# Patient Record
Sex: FEMALE | Race: WHITE | NOT HISPANIC OR LATINO | URBAN - METROPOLITAN AREA
[De-identification: names, ages, dates, MRNs, and addresses within clinical notes are randomized per-mention and may not be internally consistent; named-entity substitution may affect disease eponyms.]

---

## 2017-01-01 ENCOUNTER — COMMUNICATION - HEALTHEAST (OUTPATIENT)
Dept: INTERNAL MEDICINE | Facility: CLINIC | Age: 67
End: 2017-01-01

## 2017-01-01 DIAGNOSIS — K21.9 ESOPHAGEAL REFLUX: ICD-10-CM

## 2017-01-03 ENCOUNTER — COMMUNICATION - HEALTHEAST (OUTPATIENT)
Dept: INTERNAL MEDICINE | Facility: CLINIC | Age: 67
End: 2017-01-03

## 2017-01-03 DIAGNOSIS — K21.9 ESOPHAGEAL REFLUX: ICD-10-CM

## 2017-01-09 ENCOUNTER — OFFICE VISIT - HEALTHEAST (OUTPATIENT)
Dept: INTERNAL MEDICINE | Facility: CLINIC | Age: 67
End: 2017-01-09

## 2017-01-09 ENCOUNTER — COMMUNICATION - HEALTHEAST (OUTPATIENT)
Dept: INTERNAL MEDICINE | Facility: CLINIC | Age: 67
End: 2017-01-09

## 2017-01-09 DIAGNOSIS — F32.9 MAJOR DEPRESSION, CHRONIC: ICD-10-CM

## 2017-01-09 DIAGNOSIS — F43.21 GRIEF REACTION: ICD-10-CM

## 2017-01-09 ASSESSMENT — MIFFLIN-ST. JEOR: SCORE: 1008.96

## 2017-01-17 ENCOUNTER — COMMUNICATION - HEALTHEAST (OUTPATIENT)
Dept: INTERNAL MEDICINE | Facility: CLINIC | Age: 67
End: 2017-01-17

## 2017-01-17 ENCOUNTER — RECORDS - HEALTHEAST (OUTPATIENT)
Dept: ADMINISTRATIVE | Facility: OTHER | Age: 67
End: 2017-01-17

## 2017-01-17 DIAGNOSIS — F43.21 GRIEF REACTION: ICD-10-CM

## 2017-01-19 ENCOUNTER — COMMUNICATION - HEALTHEAST (OUTPATIENT)
Dept: INTERNAL MEDICINE | Facility: CLINIC | Age: 67
End: 2017-01-19

## 2017-01-19 DIAGNOSIS — G47.00 INSOMNIA: ICD-10-CM

## 2017-01-19 DIAGNOSIS — R06.02 SOB (SHORTNESS OF BREATH): ICD-10-CM

## 2017-01-23 ENCOUNTER — COMMUNICATION - HEALTHEAST (OUTPATIENT)
Dept: INTERNAL MEDICINE | Facility: CLINIC | Age: 67
End: 2017-01-23

## 2017-01-23 DIAGNOSIS — R52 PAIN: ICD-10-CM

## 2017-01-24 ENCOUNTER — COMMUNICATION - HEALTHEAST (OUTPATIENT)
Dept: INTERNAL MEDICINE | Facility: CLINIC | Age: 67
End: 2017-01-24

## 2017-01-30 ENCOUNTER — COMMUNICATION - HEALTHEAST (OUTPATIENT)
Dept: INTERNAL MEDICINE | Facility: CLINIC | Age: 67
End: 2017-01-30

## 2017-02-05 ENCOUNTER — COMMUNICATION - HEALTHEAST (OUTPATIENT)
Dept: INTERNAL MEDICINE | Facility: CLINIC | Age: 67
End: 2017-02-05

## 2017-02-07 ENCOUNTER — COMMUNICATION - HEALTHEAST (OUTPATIENT)
Dept: INTERNAL MEDICINE | Facility: CLINIC | Age: 67
End: 2017-02-07

## 2017-02-08 ENCOUNTER — COMMUNICATION - HEALTHEAST (OUTPATIENT)
Dept: INTERNAL MEDICINE | Facility: CLINIC | Age: 67
End: 2017-02-08

## 2017-02-09 ENCOUNTER — COMMUNICATION - HEALTHEAST (OUTPATIENT)
Dept: INTERNAL MEDICINE | Facility: CLINIC | Age: 67
End: 2017-02-09

## 2017-02-10 ENCOUNTER — COMMUNICATION - HEALTHEAST (OUTPATIENT)
Dept: INTERNAL MEDICINE | Facility: CLINIC | Age: 67
End: 2017-02-10

## 2017-02-10 DIAGNOSIS — F43.21 GRIEF REACTION: ICD-10-CM

## 2017-02-12 ENCOUNTER — COMMUNICATION - HEALTHEAST (OUTPATIENT)
Dept: INTERNAL MEDICINE | Facility: CLINIC | Age: 67
End: 2017-02-12

## 2017-02-13 ENCOUNTER — COMMUNICATION - HEALTHEAST (OUTPATIENT)
Dept: INTERNAL MEDICINE | Facility: CLINIC | Age: 67
End: 2017-02-13

## 2017-02-16 ENCOUNTER — COMMUNICATION - HEALTHEAST (OUTPATIENT)
Dept: INTERNAL MEDICINE | Facility: CLINIC | Age: 67
End: 2017-02-16

## 2017-02-20 ENCOUNTER — RECORDS - HEALTHEAST (OUTPATIENT)
Dept: ADMINISTRATIVE | Facility: OTHER | Age: 67
End: 2017-02-20

## 2017-02-20 ENCOUNTER — COMMUNICATION - HEALTHEAST (OUTPATIENT)
Dept: INTERNAL MEDICINE | Facility: CLINIC | Age: 67
End: 2017-02-20

## 2017-02-20 DIAGNOSIS — R52 PAIN: ICD-10-CM

## 2017-02-24 ENCOUNTER — COMMUNICATION - HEALTHEAST (OUTPATIENT)
Dept: INTERNAL MEDICINE | Facility: CLINIC | Age: 67
End: 2017-02-24

## 2017-02-27 ENCOUNTER — COMMUNICATION - HEALTHEAST (OUTPATIENT)
Dept: INTERNAL MEDICINE | Facility: CLINIC | Age: 67
End: 2017-02-27

## 2017-02-27 ENCOUNTER — COMMUNICATION - HEALTHEAST (OUTPATIENT)
Dept: NURSING | Facility: CLINIC | Age: 67
End: 2017-02-27

## 2017-02-27 DIAGNOSIS — F41.9 ANXIETY DISORDER, UNSPECIFIED TYPE: ICD-10-CM

## 2017-02-28 ENCOUNTER — COMMUNICATION - HEALTHEAST (OUTPATIENT)
Dept: INTERNAL MEDICINE | Facility: CLINIC | Age: 67
End: 2017-02-28

## 2017-02-28 ENCOUNTER — COMMUNICATION - HEALTHEAST (OUTPATIENT)
Dept: ADMINISTRATIVE | Facility: CLINIC | Age: 67
End: 2017-02-28

## 2017-03-01 ENCOUNTER — COMMUNICATION - HEALTHEAST (OUTPATIENT)
Dept: INTERNAL MEDICINE | Facility: CLINIC | Age: 67
End: 2017-03-01

## 2017-03-03 ENCOUNTER — COMMUNICATION - HEALTHEAST (OUTPATIENT)
Dept: INTERNAL MEDICINE | Facility: CLINIC | Age: 67
End: 2017-03-03

## 2017-03-03 ENCOUNTER — AMBULATORY - HEALTHEAST (OUTPATIENT)
Dept: INTERNAL MEDICINE | Facility: CLINIC | Age: 67
End: 2017-03-03

## 2017-03-06 ENCOUNTER — COMMUNICATION - HEALTHEAST (OUTPATIENT)
Dept: INTERNAL MEDICINE | Facility: CLINIC | Age: 67
End: 2017-03-06

## 2017-03-14 ENCOUNTER — COMMUNICATION - HEALTHEAST (OUTPATIENT)
Dept: NURSING | Facility: CLINIC | Age: 67
End: 2017-03-14

## 2017-03-16 ENCOUNTER — COMMUNICATION - HEALTHEAST (OUTPATIENT)
Dept: INTERNAL MEDICINE | Facility: CLINIC | Age: 67
End: 2017-03-16

## 2017-03-16 DIAGNOSIS — R52 PAIN: ICD-10-CM

## 2017-03-20 ENCOUNTER — COMMUNICATION - HEALTHEAST (OUTPATIENT)
Dept: INTERNAL MEDICINE | Facility: CLINIC | Age: 67
End: 2017-03-20

## 2017-03-21 ENCOUNTER — COMMUNICATION - HEALTHEAST (OUTPATIENT)
Dept: INTERNAL MEDICINE | Facility: CLINIC | Age: 67
End: 2017-03-21

## 2017-03-21 ENCOUNTER — OFFICE VISIT - HEALTHEAST (OUTPATIENT)
Dept: NURSING | Facility: CLINIC | Age: 67
End: 2017-03-21

## 2017-03-21 ENCOUNTER — COMMUNICATION - HEALTHEAST (OUTPATIENT)
Dept: NURSING | Facility: CLINIC | Age: 67
End: 2017-03-21

## 2017-03-21 DIAGNOSIS — F41.1 GENERALIZED ANXIETY DISORDER: ICD-10-CM

## 2017-03-21 DIAGNOSIS — M81.0 OSTEOPOROSIS: ICD-10-CM

## 2017-03-21 DIAGNOSIS — K21.9 GASTROESOPHAGEAL REFLUX DISEASE, ESOPHAGITIS PRESENCE NOT SPECIFIED: ICD-10-CM

## 2017-03-21 DIAGNOSIS — G47.00 INSOMNIA, UNSPECIFIED TYPE: ICD-10-CM

## 2017-03-21 DIAGNOSIS — G89.29 CHRONIC PAIN: ICD-10-CM

## 2017-03-21 DIAGNOSIS — G89.29 OTHER CHRONIC PAIN: ICD-10-CM

## 2017-03-21 DIAGNOSIS — F43.21 GRIEF REACTION: ICD-10-CM

## 2017-03-21 DIAGNOSIS — G47.00 INSOMNIA: ICD-10-CM

## 2017-03-22 ENCOUNTER — COMMUNICATION - HEALTHEAST (OUTPATIENT)
Dept: NURSING | Facility: CLINIC | Age: 67
End: 2017-03-22

## 2017-03-27 ENCOUNTER — RECORDS - HEALTHEAST (OUTPATIENT)
Dept: ADMINISTRATIVE | Facility: OTHER | Age: 67
End: 2017-03-27

## 2017-03-27 ENCOUNTER — COMMUNICATION - HEALTHEAST (OUTPATIENT)
Dept: NURSING | Facility: CLINIC | Age: 67
End: 2017-03-27

## 2017-03-27 ENCOUNTER — COMMUNICATION - HEALTHEAST (OUTPATIENT)
Dept: INTERNAL MEDICINE | Facility: CLINIC | Age: 67
End: 2017-03-27

## 2017-03-27 DIAGNOSIS — F41.1 GENERALIZED ANXIETY DISORDER: ICD-10-CM

## 2017-03-28 ENCOUNTER — COMMUNICATION - HEALTHEAST (OUTPATIENT)
Dept: INTERNAL MEDICINE | Facility: CLINIC | Age: 67
End: 2017-03-28

## 2017-03-30 ENCOUNTER — COMMUNICATION - HEALTHEAST (OUTPATIENT)
Dept: NURSING | Facility: CLINIC | Age: 67
End: 2017-03-30

## 2017-04-03 ENCOUNTER — COMMUNICATION - HEALTHEAST (OUTPATIENT)
Dept: NURSING | Facility: CLINIC | Age: 67
End: 2017-04-03

## 2017-04-03 DIAGNOSIS — F41.1 GENERALIZED ANXIETY DISORDER: ICD-10-CM

## 2017-04-04 ENCOUNTER — COMMUNICATION - HEALTHEAST (OUTPATIENT)
Dept: INTERNAL MEDICINE | Facility: CLINIC | Age: 67
End: 2017-04-04

## 2017-04-12 ENCOUNTER — COMMUNICATION - HEALTHEAST (OUTPATIENT)
Dept: INTERNAL MEDICINE | Facility: CLINIC | Age: 67
End: 2017-04-12

## 2017-04-14 ENCOUNTER — RECORDS - HEALTHEAST (OUTPATIENT)
Dept: ADMINISTRATIVE | Facility: OTHER | Age: 67
End: 2017-04-14

## 2017-04-14 ENCOUNTER — COMMUNICATION - HEALTHEAST (OUTPATIENT)
Dept: NURSING | Facility: CLINIC | Age: 67
End: 2017-04-14

## 2017-04-14 ENCOUNTER — COMMUNICATION - HEALTHEAST (OUTPATIENT)
Dept: INTERNAL MEDICINE | Facility: CLINIC | Age: 67
End: 2017-04-14

## 2017-04-14 DIAGNOSIS — R52 PAIN: ICD-10-CM

## 2017-04-17 ENCOUNTER — OFFICE VISIT - HEALTHEAST (OUTPATIENT)
Dept: INTERNAL MEDICINE | Facility: CLINIC | Age: 67
End: 2017-04-17

## 2017-04-17 ENCOUNTER — COMMUNICATION - HEALTHEAST (OUTPATIENT)
Dept: INTERNAL MEDICINE | Facility: CLINIC | Age: 67
End: 2017-04-17

## 2017-04-17 ENCOUNTER — COMMUNICATION - HEALTHEAST (OUTPATIENT)
Dept: NURSING | Facility: CLINIC | Age: 67
End: 2017-04-17

## 2017-04-17 ENCOUNTER — COMMUNICATION - HEALTHEAST (OUTPATIENT)
Dept: SCHEDULING | Facility: CLINIC | Age: 67
End: 2017-04-17

## 2017-04-17 DIAGNOSIS — I95.9 HYPOTENSION: ICD-10-CM

## 2017-04-17 ASSESSMENT — MIFFLIN-ST. JEOR: SCORE: 1013.95

## 2017-04-18 ENCOUNTER — COMMUNICATION - HEALTHEAST (OUTPATIENT)
Dept: INTERNAL MEDICINE | Facility: CLINIC | Age: 67
End: 2017-04-18

## 2017-04-19 ENCOUNTER — COMMUNICATION - HEALTHEAST (OUTPATIENT)
Dept: INTERNAL MEDICINE | Facility: CLINIC | Age: 67
End: 2017-04-19

## 2017-04-21 ENCOUNTER — COMMUNICATION - HEALTHEAST (OUTPATIENT)
Dept: INTERNAL MEDICINE | Facility: CLINIC | Age: 67
End: 2017-04-21

## 2017-04-21 ENCOUNTER — COMMUNICATION - HEALTHEAST (OUTPATIENT)
Dept: BEHAVIORAL HEALTH | Facility: HOSPITAL | Age: 67
End: 2017-04-21

## 2017-04-22 ENCOUNTER — COMMUNICATION - HEALTHEAST (OUTPATIENT)
Dept: NURSING | Facility: CLINIC | Age: 67
End: 2017-04-22

## 2017-04-27 ENCOUNTER — COMMUNICATION - HEALTHEAST (OUTPATIENT)
Dept: INTERNAL MEDICINE | Facility: CLINIC | Age: 67
End: 2017-04-27

## 2017-04-27 ENCOUNTER — COMMUNICATION - HEALTHEAST (OUTPATIENT)
Dept: SCHEDULING | Facility: CLINIC | Age: 67
End: 2017-04-27

## 2017-04-27 ENCOUNTER — RECORDS - HEALTHEAST (OUTPATIENT)
Dept: ADMINISTRATIVE | Facility: OTHER | Age: 67
End: 2017-04-27

## 2017-04-27 DIAGNOSIS — E78.5 HYPERLIPIDEMIA: ICD-10-CM

## 2017-04-28 ENCOUNTER — COMMUNICATION - HEALTHEAST (OUTPATIENT)
Dept: INTERNAL MEDICINE | Facility: CLINIC | Age: 67
End: 2017-04-28

## 2017-04-28 ENCOUNTER — OFFICE VISIT - HEALTHEAST (OUTPATIENT)
Dept: BEHAVIORAL HEALTH | Facility: CLINIC | Age: 67
End: 2017-04-28

## 2017-04-28 DIAGNOSIS — F41.1 GENERALIZED ANXIETY DISORDER: ICD-10-CM

## 2017-04-28 DIAGNOSIS — F33.1 MODERATE EPISODE OF RECURRENT MAJOR DEPRESSIVE DISORDER (H): ICD-10-CM

## 2017-04-30 ENCOUNTER — COMMUNICATION - HEALTHEAST (OUTPATIENT)
Dept: INTERNAL MEDICINE | Facility: CLINIC | Age: 67
End: 2017-04-30

## 2017-05-01 ENCOUNTER — COMMUNICATION - HEALTHEAST (OUTPATIENT)
Dept: ADMINISTRATIVE | Facility: CLINIC | Age: 67
End: 2017-05-01

## 2017-05-02 ENCOUNTER — COMMUNICATION - HEALTHEAST (OUTPATIENT)
Dept: INTERNAL MEDICINE | Facility: CLINIC | Age: 67
End: 2017-05-02

## 2017-05-08 ENCOUNTER — COMMUNICATION - HEALTHEAST (OUTPATIENT)
Dept: INTERNAL MEDICINE | Facility: CLINIC | Age: 67
End: 2017-05-08

## 2017-05-09 ENCOUNTER — COMMUNICATION - HEALTHEAST (OUTPATIENT)
Dept: INTERNAL MEDICINE | Facility: CLINIC | Age: 67
End: 2017-05-09

## 2017-05-09 DIAGNOSIS — R52 PAIN: ICD-10-CM

## 2017-05-24 ENCOUNTER — COMMUNICATION - HEALTHEAST (OUTPATIENT)
Dept: INTERNAL MEDICINE | Facility: CLINIC | Age: 67
End: 2017-05-24

## 2017-05-25 ENCOUNTER — COMMUNICATION - HEALTHEAST (OUTPATIENT)
Dept: INTERNAL MEDICINE | Facility: CLINIC | Age: 67
End: 2017-05-25

## 2017-05-25 DIAGNOSIS — F41.1 GENERALIZED ANXIETY DISORDER: ICD-10-CM

## 2017-06-01 ENCOUNTER — COMMUNICATION - HEALTHEAST (OUTPATIENT)
Dept: INTERNAL MEDICINE | Facility: CLINIC | Age: 67
End: 2017-06-01

## 2017-06-02 ENCOUNTER — OFFICE VISIT - HEALTHEAST (OUTPATIENT)
Dept: BEHAVIORAL HEALTH | Facility: HOSPITAL | Age: 67
End: 2017-06-02

## 2017-06-02 DIAGNOSIS — F41.1 GENERALIZED ANXIETY DISORDER: ICD-10-CM

## 2017-06-02 ASSESSMENT — MIFFLIN-ST. JEOR: SCORE: 1004.42

## 2017-06-08 ENCOUNTER — COMMUNICATION - HEALTHEAST (OUTPATIENT)
Dept: BEHAVIORAL HEALTH | Facility: HOSPITAL | Age: 67
End: 2017-06-08

## 2017-06-08 ENCOUNTER — RECORDS - HEALTHEAST (OUTPATIENT)
Dept: ADMINISTRATIVE | Facility: OTHER | Age: 67
End: 2017-06-08

## 2017-06-08 ENCOUNTER — COMMUNICATION - HEALTHEAST (OUTPATIENT)
Dept: INTERNAL MEDICINE | Facility: CLINIC | Age: 67
End: 2017-06-08

## 2017-06-08 DIAGNOSIS — F41.1 GAD (GENERALIZED ANXIETY DISORDER): ICD-10-CM

## 2017-06-08 DIAGNOSIS — R52 PAIN: ICD-10-CM

## 2017-06-15 ENCOUNTER — COMMUNICATION - HEALTHEAST (OUTPATIENT)
Dept: INTERNAL MEDICINE | Facility: CLINIC | Age: 67
End: 2017-06-15

## 2017-06-19 ENCOUNTER — COMMUNICATION - HEALTHEAST (OUTPATIENT)
Dept: INTERNAL MEDICINE | Facility: CLINIC | Age: 67
End: 2017-06-19

## 2017-06-19 ENCOUNTER — COMMUNICATION - HEALTHEAST (OUTPATIENT)
Dept: BEHAVIORAL HEALTH | Facility: HOSPITAL | Age: 67
End: 2017-06-19

## 2017-06-19 DIAGNOSIS — F41.1 GENERALIZED ANXIETY DISORDER: ICD-10-CM

## 2017-06-19 DIAGNOSIS — K21.9 ESOPHAGEAL REFLUX: ICD-10-CM

## 2017-06-19 DIAGNOSIS — F41.1 GAD (GENERALIZED ANXIETY DISORDER): ICD-10-CM

## 2017-06-26 ENCOUNTER — COMMUNICATION - HEALTHEAST (OUTPATIENT)
Dept: INTERNAL MEDICINE | Facility: CLINIC | Age: 67
End: 2017-06-26

## 2017-06-28 ENCOUNTER — COMMUNICATION - HEALTHEAST (OUTPATIENT)
Dept: INTERNAL MEDICINE | Facility: CLINIC | Age: 67
End: 2017-06-28

## 2017-06-28 ENCOUNTER — AMBULATORY - HEALTHEAST (OUTPATIENT)
Dept: BEHAVIORAL HEALTH | Facility: HOSPITAL | Age: 67
End: 2017-06-28

## 2017-06-28 DIAGNOSIS — R06.02 SOB (SHORTNESS OF BREATH): ICD-10-CM

## 2017-06-29 ENCOUNTER — COMMUNICATION - HEALTHEAST (OUTPATIENT)
Dept: BEHAVIORAL HEALTH | Facility: CLINIC | Age: 67
End: 2017-06-29

## 2017-06-29 ENCOUNTER — COMMUNICATION - HEALTHEAST (OUTPATIENT)
Dept: INTERNAL MEDICINE | Facility: CLINIC | Age: 67
End: 2017-06-29

## 2017-07-10 ENCOUNTER — COMMUNICATION - HEALTHEAST (OUTPATIENT)
Dept: BEHAVIORAL HEALTH | Facility: HOSPITAL | Age: 67
End: 2017-07-10

## 2017-07-11 ENCOUNTER — COMMUNICATION - HEALTHEAST (OUTPATIENT)
Dept: BEHAVIORAL HEALTH | Facility: HOSPITAL | Age: 67
End: 2017-07-11

## 2017-07-11 DIAGNOSIS — F41.1 GAD (GENERALIZED ANXIETY DISORDER): ICD-10-CM

## 2017-07-11 DIAGNOSIS — F41.1 GENERALIZED ANXIETY DISORDER: ICD-10-CM

## 2017-07-16 ENCOUNTER — COMMUNICATION - HEALTHEAST (OUTPATIENT)
Dept: INTERNAL MEDICINE | Facility: CLINIC | Age: 67
End: 2017-07-16

## 2017-07-17 ENCOUNTER — COMMUNICATION - HEALTHEAST (OUTPATIENT)
Dept: BEHAVIORAL HEALTH | Facility: HOSPITAL | Age: 67
End: 2017-07-17

## 2017-07-17 DIAGNOSIS — F41.1 GAD (GENERALIZED ANXIETY DISORDER): ICD-10-CM

## 2017-07-18 ENCOUNTER — COMMUNICATION - HEALTHEAST (OUTPATIENT)
Dept: BEHAVIORAL HEALTH | Facility: HOSPITAL | Age: 67
End: 2017-07-18

## 2017-07-19 ENCOUNTER — COMMUNICATION - HEALTHEAST (OUTPATIENT)
Dept: BEHAVIORAL HEALTH | Facility: CLINIC | Age: 67
End: 2017-07-19

## 2017-07-19 ENCOUNTER — COMMUNICATION - HEALTHEAST (OUTPATIENT)
Dept: BEHAVIORAL HEALTH | Facility: HOSPITAL | Age: 67
End: 2017-07-19

## 2017-07-19 ENCOUNTER — AMBULATORY - HEALTHEAST (OUTPATIENT)
Dept: BEHAVIORAL HEALTH | Facility: CLINIC | Age: 67
End: 2017-07-19

## 2017-07-19 DIAGNOSIS — F41.1 GAD (GENERALIZED ANXIETY DISORDER): ICD-10-CM

## 2017-07-24 ENCOUNTER — RECORDS - HEALTHEAST (OUTPATIENT)
Dept: ADMINISTRATIVE | Facility: OTHER | Age: 67
End: 2017-07-24

## 2017-07-24 ENCOUNTER — COMMUNICATION - HEALTHEAST (OUTPATIENT)
Dept: INTERNAL MEDICINE | Facility: CLINIC | Age: 67
End: 2017-07-24

## 2017-07-26 ENCOUNTER — COMMUNICATION - HEALTHEAST (OUTPATIENT)
Dept: BEHAVIORAL HEALTH | Facility: CLINIC | Age: 67
End: 2017-07-26

## 2017-07-26 DIAGNOSIS — F41.1 GAD (GENERALIZED ANXIETY DISORDER): ICD-10-CM

## 2017-07-31 ENCOUNTER — COMMUNICATION - HEALTHEAST (OUTPATIENT)
Dept: INTERNAL MEDICINE | Facility: CLINIC | Age: 67
End: 2017-07-31

## 2017-08-02 ENCOUNTER — COMMUNICATION - HEALTHEAST (OUTPATIENT)
Dept: BEHAVIORAL HEALTH | Facility: CLINIC | Age: 67
End: 2017-08-02

## 2017-08-02 DIAGNOSIS — F41.1 GAD (GENERALIZED ANXIETY DISORDER): ICD-10-CM

## 2017-08-04 ENCOUNTER — OFFICE VISIT - HEALTHEAST (OUTPATIENT)
Dept: BEHAVIORAL HEALTH | Facility: HOSPITAL | Age: 67
End: 2017-08-04

## 2017-08-04 DIAGNOSIS — F13.20 BENZODIAZEPINE DEPENDENCE (H): ICD-10-CM

## 2017-08-04 DIAGNOSIS — F41.1 GAD (GENERALIZED ANXIETY DISORDER): ICD-10-CM

## 2017-08-04 ASSESSMENT — MIFFLIN-ST. JEOR: SCORE: 1010.77

## 2017-08-07 ENCOUNTER — COMMUNICATION - HEALTHEAST (OUTPATIENT)
Dept: INTERNAL MEDICINE | Facility: CLINIC | Age: 67
End: 2017-08-07

## 2017-08-14 ENCOUNTER — COMMUNICATION - HEALTHEAST (OUTPATIENT)
Dept: INTERNAL MEDICINE | Facility: CLINIC | Age: 67
End: 2017-08-14

## 2017-08-14 ENCOUNTER — COMMUNICATION - HEALTHEAST (OUTPATIENT)
Dept: BEHAVIORAL HEALTH | Facility: HOSPITAL | Age: 67
End: 2017-08-14

## 2017-08-15 ENCOUNTER — COMMUNICATION - HEALTHEAST (OUTPATIENT)
Dept: BEHAVIORAL HEALTH | Facility: CLINIC | Age: 67
End: 2017-08-15

## 2017-08-15 DIAGNOSIS — F41.1 GENERALIZED ANXIETY DISORDER: ICD-10-CM

## 2017-08-20 ENCOUNTER — COMMUNICATION - HEALTHEAST (OUTPATIENT)
Dept: BEHAVIORAL HEALTH | Facility: HOSPITAL | Age: 67
End: 2017-08-20

## 2017-08-29 ENCOUNTER — COMMUNICATION - HEALTHEAST (OUTPATIENT)
Dept: INTERNAL MEDICINE | Facility: CLINIC | Age: 67
End: 2017-08-29

## 2017-08-29 ENCOUNTER — RECORDS - HEALTHEAST (OUTPATIENT)
Dept: ADMINISTRATIVE | Facility: OTHER | Age: 67
End: 2017-08-29

## 2017-08-29 ENCOUNTER — COMMUNICATION - HEALTHEAST (OUTPATIENT)
Dept: BEHAVIORAL HEALTH | Facility: HOSPITAL | Age: 67
End: 2017-08-29

## 2017-08-29 DIAGNOSIS — F13.20 BENZODIAZEPINE DEPENDENCE (H): ICD-10-CM

## 2017-08-29 DIAGNOSIS — F41.1 GAD (GENERALIZED ANXIETY DISORDER): ICD-10-CM

## 2017-08-31 ENCOUNTER — COMMUNICATION - HEALTHEAST (OUTPATIENT)
Dept: INTERNAL MEDICINE | Facility: CLINIC | Age: 67
End: 2017-08-31

## 2017-08-31 ENCOUNTER — OFFICE VISIT - HEALTHEAST (OUTPATIENT)
Dept: INTERNAL MEDICINE | Facility: CLINIC | Age: 67
End: 2017-08-31

## 2017-08-31 DIAGNOSIS — G89.4 CHRONIC PAIN SYNDROME: ICD-10-CM

## 2017-08-31 DIAGNOSIS — F41.1 GENERALIZED ANXIETY DISORDER: ICD-10-CM

## 2017-08-31 DIAGNOSIS — I10 ESSENTIAL HYPERTENSION WITH GOAL BLOOD PRESSURE LESS THAN 140/90: ICD-10-CM

## 2017-08-31 DIAGNOSIS — E78.2 MIXED HYPERLIPIDEMIA: ICD-10-CM

## 2017-08-31 DIAGNOSIS — E55.9 VITAMIN D DEFICIENCY: ICD-10-CM

## 2017-08-31 DIAGNOSIS — M81.0 OSTEOPOROSIS: ICD-10-CM

## 2017-08-31 LAB
CHOLEST SERPL-MCNC: 185 MG/DL
FASTING STATUS PATIENT QL REPORTED: YES
HDLC SERPL-MCNC: 62 MG/DL
LDLC SERPL CALC-MCNC: 106 MG/DL
TRIGL SERPL-MCNC: 84 MG/DL

## 2017-08-31 ASSESSMENT — MIFFLIN-ST. JEOR: SCORE: 1018.03

## 2017-09-05 ENCOUNTER — COMMUNICATION - HEALTHEAST (OUTPATIENT)
Dept: BEHAVIORAL HEALTH | Facility: CLINIC | Age: 67
End: 2017-09-05

## 2017-09-06 ENCOUNTER — COMMUNICATION - HEALTHEAST (OUTPATIENT)
Dept: INTERNAL MEDICINE | Facility: CLINIC | Age: 67
End: 2017-09-06

## 2017-09-07 ENCOUNTER — RECORDS - HEALTHEAST (OUTPATIENT)
Dept: ADMINISTRATIVE | Facility: OTHER | Age: 67
End: 2017-09-07

## 2017-09-08 ENCOUNTER — OFFICE VISIT - HEALTHEAST (OUTPATIENT)
Dept: BEHAVIORAL HEALTH | Facility: HOSPITAL | Age: 67
End: 2017-09-08

## 2017-09-08 DIAGNOSIS — F13.20 BENZODIAZEPINE DEPENDENCE (H): ICD-10-CM

## 2017-09-08 DIAGNOSIS — F41.1 GAD (GENERALIZED ANXIETY DISORDER): ICD-10-CM

## 2017-09-08 DIAGNOSIS — F41.1 GENERALIZED ANXIETY DISORDER: ICD-10-CM

## 2017-09-10 ENCOUNTER — COMMUNICATION - HEALTHEAST (OUTPATIENT)
Dept: INTERNAL MEDICINE | Facility: CLINIC | Age: 67
End: 2017-09-10

## 2017-09-12 ENCOUNTER — HOSPITAL ENCOUNTER (OUTPATIENT)
Dept: MAMMOGRAPHY | Facility: HOSPITAL | Age: 67
Discharge: HOME | End: 2017-09-12
Attending: INTERNAL MEDICINE

## 2017-09-12 DIAGNOSIS — Z12.31 VISIT FOR SCREENING MAMMOGRAM: ICD-10-CM

## 2017-09-14 ENCOUNTER — COMMUNICATION - HEALTHEAST (OUTPATIENT)
Dept: INTERNAL MEDICINE | Facility: CLINIC | Age: 67
End: 2017-09-14

## 2017-09-14 DIAGNOSIS — E78.5 HYPERLIPIDEMIA: ICD-10-CM

## 2017-09-29 ENCOUNTER — COMMUNICATION - HEALTHEAST (OUTPATIENT)
Dept: INTERNAL MEDICINE | Facility: CLINIC | Age: 67
End: 2017-09-29

## 2017-10-03 ENCOUNTER — COMMUNICATION - HEALTHEAST (OUTPATIENT)
Dept: INTERNAL MEDICINE | Facility: CLINIC | Age: 67
End: 2017-10-03

## 2017-10-05 ENCOUNTER — COMMUNICATION - HEALTHEAST (OUTPATIENT)
Dept: INTERNAL MEDICINE | Facility: CLINIC | Age: 67
End: 2017-10-05

## 2017-10-06 ENCOUNTER — OFFICE VISIT - HEALTHEAST (OUTPATIENT)
Dept: INTERNAL MEDICINE | Facility: CLINIC | Age: 67
End: 2017-10-06

## 2017-10-06 DIAGNOSIS — F41.1 GAD (GENERALIZED ANXIETY DISORDER): ICD-10-CM
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
## 2017-10-06 DIAGNOSIS — M81.0 OSTEOPOROSIS: ICD-10-CM

## 2017-10-06 DIAGNOSIS — I10 HTN (HYPERTENSION): ICD-10-CM

## 2017-10-08 ENCOUNTER — COMMUNICATION - HEALTHEAST (OUTPATIENT)
Dept: INTERNAL MEDICINE | Facility: CLINIC | Age: 67
End: 2017-10-08

## 2017-10-11 ENCOUNTER — COMMUNICATION - HEALTHEAST (OUTPATIENT)
Dept: SCHEDULING | Facility: CLINIC | Age: 67
End: 2017-10-11

## 2017-10-11 ENCOUNTER — COMMUNICATION - HEALTHEAST (OUTPATIENT)
Dept: INTERNAL MEDICINE | Facility: CLINIC | Age: 67
End: 2017-10-11
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
## 2017-10-12 ENCOUNTER — COMMUNICATION - HEALTHEAST (OUTPATIENT)
Dept: INTERNAL MEDICINE | Facility: CLINIC | Age: 67
End: 2017-10-12

## 2017-10-12 ENCOUNTER — HOME CARE/HOSPICE - HEALTHEAST (OUTPATIENT)
Dept: HOME HEALTH SERVICES | Facility: HOME HEALTH | Age: 67
End: 2017-10-12

## 2017-10-13 ENCOUNTER — COMMUNICATION - HEALTHEAST (OUTPATIENT)
Dept: HOME HEALTH SERVICES | Facility: HOME HEALTH | Age: 67
End: 2017-10-13

## 2017-10-16 ENCOUNTER — COMMUNICATION - HEALTHEAST (OUTPATIENT)
Dept: FAMILY MEDICINE | Facility: CLINIC | Age: 67
End: 2017-10-16

## 2017-10-16 ENCOUNTER — COMMUNICATION - HEALTHEAST (OUTPATIENT)
Dept: INTERNAL MEDICINE | Facility: CLINIC | Age: 67
End: 2017-10-16

## 2017-10-16 ENCOUNTER — COMMUNICATION - HEALTHEAST (OUTPATIENT)
Dept: NEUROSURGERY | Facility: CLINIC | Age: 67
End: 2017-10-16

## 2017-10-16 DIAGNOSIS — S12.100A C2 CERVICAL FRACTURE (H): ICD-10-CM

## 2017-10-19 ENCOUNTER — OFFICE VISIT - HEALTHEAST (OUTPATIENT)
Dept: GERIATRICS | Facility: CLINIC | Age: 67
End: 2017-10-19

## 2017-10-19 DIAGNOSIS — S22.20XA CLOSED FRACTURE OF STERNUM, UNSPECIFIED PORTION OF STERNUM, INITIAL ENCOUNTER: ICD-10-CM

## 2017-10-19 DIAGNOSIS — S12.100A C2 CERVICAL FRACTURE (H): ICD-10-CM

## 2017-10-19 DIAGNOSIS — Z76.5 DRUG-SEEKING BEHAVIOR: ICD-10-CM

## 2017-10-19 DIAGNOSIS — G89.29 OTHER CHRONIC PAIN: ICD-10-CM

## 2017-10-19 DIAGNOSIS — F41.1 GENERALIZED ANXIETY DISORDER: ICD-10-CM

## 2017-10-19 DIAGNOSIS — G93.40 ACUTE ENCEPHALOPATHY: ICD-10-CM

## 2017-10-19 DIAGNOSIS — I10 ESSENTIAL HYPERTENSION: ICD-10-CM

## 2017-10-23 ENCOUNTER — OFFICE VISIT - HEALTHEAST (OUTPATIENT)
Dept: GERIATRICS | Facility: CLINIC | Age: 67
End: 2017-10-23

## 2017-10-23 DIAGNOSIS — S22.20XA CLOSED FRACTURE OF STERNUM, UNSPECIFIED PORTION OF STERNUM, INITIAL ENCOUNTER: ICD-10-CM

## 2017-10-23 DIAGNOSIS — G89.29 OTHER CHRONIC PAIN: ICD-10-CM

## 2017-10-23 DIAGNOSIS — G93.40 ACUTE ENCEPHALOPATHY: ICD-10-CM

## 2017-10-23 DIAGNOSIS — F41.1 GENERALIZED ANXIETY DISORDER: ICD-10-CM

## 2017-10-23 DIAGNOSIS — S12.101A CLOSED NONDISPLACED FRACTURE OF SECOND CERVICAL VERTEBRA, UNSPECIFIED FRACTURE MORPHOLOGY, INITIAL ENCOUNTER (H): ICD-10-CM

## 2017-10-27 ENCOUNTER — COMMUNICATION - HEALTHEAST (OUTPATIENT)
Dept: GERIATRICS | Facility: CLINIC | Age: 67
End: 2017-10-27

## 2017-10-27 ENCOUNTER — OFFICE VISIT - HEALTHEAST (OUTPATIENT)
Dept: NEUROSURGERY | Facility: CLINIC | Age: 67
End: 2017-10-27

## 2017-10-27 ENCOUNTER — HOSPITAL ENCOUNTER (OUTPATIENT)
Dept: RADIOLOGY | Facility: CLINIC | Age: 67
Discharge: HOME | End: 2017-10-27
Attending: NEUROLOGICAL SURGERY

## 2017-10-27 ENCOUNTER — AMBULATORY - HEALTHEAST (OUTPATIENT)
Dept: GERIATRICS | Facility: CLINIC | Age: 67
End: 2017-10-27

## 2017-10-27 DIAGNOSIS — S12.100A C2 CERVICAL FRACTURE (H): ICD-10-CM

## 2017-10-27 ASSESSMENT — MIFFLIN-ST. JEOR: SCORE: 1038.44

## 2017-10-28 ENCOUNTER — COMMUNICATION - HEALTHEAST (OUTPATIENT)
Dept: INTERNAL MEDICINE | Facility: CLINIC | Age: 67
End: 2017-10-28

## 2017-11-01 ENCOUNTER — OFFICE VISIT - HEALTHEAST (OUTPATIENT)
Dept: INTERNAL MEDICINE | Facility: CLINIC | Age: 67
End: 2017-11-01

## 2017-11-01 DIAGNOSIS — S22.20XS CLOSED FRACTURE OF STERNUM, UNSPECIFIED PORTION OF STERNUM, SEQUELA: ICD-10-CM

## 2017-11-01 DIAGNOSIS — F41.1 GAD (GENERALIZED ANXIETY DISORDER): ICD-10-CM

## 2017-11-01 DIAGNOSIS — S12.101A CLOSED NONDISPLACED FRACTURE OF SECOND CERVICAL VERTEBRA, UNSPECIFIED FRACTURE MORPHOLOGY, INITIAL ENCOUNTER (H): ICD-10-CM

## 2017-11-01 DIAGNOSIS — M80.00XG OSTEOPOROSIS WITH CURRENT PATHOLOGICAL FRACTURE WITH DELAYED HEALING, UNSPECIFIED OSTEOPOROSIS TYPE, SUBSEQUENT ENCOUNTER: ICD-10-CM

## 2017-11-02 ENCOUNTER — AMBULATORY - HEALTHEAST (OUTPATIENT)
Dept: OTHER | Facility: CLINIC | Age: 67
End: 2017-11-02

## 2017-11-02 DIAGNOSIS — S12.100A: ICD-10-CM

## 2017-11-03 ENCOUNTER — OFFICE VISIT - HEALTHEAST (OUTPATIENT)
Dept: BEHAVIORAL HEALTH | Facility: HOSPITAL | Age: 67
End: 2017-11-03

## 2017-11-03 DIAGNOSIS — F41.1 GENERALIZED ANXIETY DISORDER: ICD-10-CM

## 2017-11-03 DIAGNOSIS — F41.1 GAD (GENERALIZED ANXIETY DISORDER): ICD-10-CM

## 2017-11-03 DIAGNOSIS — F13.20 BENZODIAZEPINE DEPENDENCE (H): ICD-10-CM

## 2017-11-03 ASSESSMENT — MIFFLIN-ST. JEOR: SCORE: 1005.33

## 2017-11-06 ENCOUNTER — RECORDS - HEALTHEAST (OUTPATIENT)
Dept: ADMINISTRATIVE | Facility: OTHER | Age: 67
End: 2017-11-06

## 2017-11-07 ENCOUNTER — COMMUNICATION - HEALTHEAST (OUTPATIENT)
Dept: INTERNAL MEDICINE | Facility: CLINIC | Age: 67
End: 2017-11-07

## 2017-11-07 ENCOUNTER — COMMUNICATION - HEALTHEAST (OUTPATIENT)
Dept: OTHER | Facility: CLINIC | Age: 67
End: 2017-11-07

## 2017-11-07 DIAGNOSIS — S22.20XA CLOSED FRACTURE OF STERNUM, UNSPECIFIED PORTION OF STERNUM, INITIAL ENCOUNTER: ICD-10-CM

## 2017-11-07 DIAGNOSIS — G89.29 OTHER CHRONIC PAIN: ICD-10-CM

## 2017-11-07 DIAGNOSIS — J40 BRONCHITIS: ICD-10-CM

## 2017-11-09 ENCOUNTER — COMMUNICATION - HEALTHEAST (OUTPATIENT)
Dept: NEUROSURGERY | Facility: CLINIC | Age: 67
End: 2017-11-09

## 2017-11-10 ENCOUNTER — COMMUNICATION - HEALTHEAST (OUTPATIENT)
Dept: INTERNAL MEDICINE | Facility: CLINIC | Age: 67
End: 2017-11-10

## 2017-11-13 ENCOUNTER — COMMUNICATION - HEALTHEAST (OUTPATIENT)
Dept: INTERNAL MEDICINE | Facility: CLINIC | Age: 67
End: 2017-11-13

## 2017-11-14 ENCOUNTER — COMMUNICATION - HEALTHEAST (OUTPATIENT)
Dept: INTERNAL MEDICINE | Facility: CLINIC | Age: 67
End: 2017-11-14

## 2017-11-16 ENCOUNTER — COMMUNICATION - HEALTHEAST (OUTPATIENT)
Dept: INTERNAL MEDICINE | Facility: CLINIC | Age: 67
End: 2017-11-16

## 2017-11-20 ENCOUNTER — COMMUNICATION - HEALTHEAST (OUTPATIENT)
Dept: SCHEDULING | Facility: CLINIC | Age: 67
End: 2017-11-20

## 2017-11-21 ENCOUNTER — OFFICE VISIT - HEALTHEAST (OUTPATIENT)
Dept: INTERNAL MEDICINE | Facility: CLINIC | Age: 67
End: 2017-11-21

## 2017-11-21 ENCOUNTER — COMMUNICATION - HEALTHEAST (OUTPATIENT)
Dept: BEHAVIORAL HEALTH | Facility: HOSPITAL | Age: 67
End: 2017-11-21

## 2017-11-21 ENCOUNTER — HOSPITAL ENCOUNTER (OUTPATIENT)
Dept: CT IMAGING | Facility: HOSPITAL | Age: 67
Discharge: HOME | End: 2017-11-21

## 2017-11-21 DIAGNOSIS — S12.101A CLOSED NONDISPLACED FRACTURE OF SECOND CERVICAL VERTEBRA, UNSPECIFIED FRACTURE MORPHOLOGY, INITIAL ENCOUNTER (H): ICD-10-CM

## 2017-11-21 DIAGNOSIS — E55.9 VITAMIN D DEFICIENCY: ICD-10-CM

## 2017-11-21 DIAGNOSIS — F41.1 GENERALIZED ANXIETY DISORDER: ICD-10-CM

## 2017-11-21 DIAGNOSIS — M80.00XG OSTEOPOROSIS WITH CURRENT PATHOLOGICAL FRACTURE WITH DELAYED HEALING, UNSPECIFIED OSTEOPOROSIS TYPE, SUBSEQUENT ENCOUNTER: ICD-10-CM

## 2017-11-21 DIAGNOSIS — S12.100A C2 CERVICAL FRACTURE (H): ICD-10-CM

## 2017-11-22 ENCOUNTER — COMMUNICATION - HEALTHEAST (OUTPATIENT)
Dept: RADIOLOGY | Facility: CLINIC | Age: 67
End: 2017-11-22

## 2017-11-24 ENCOUNTER — COMMUNICATION - HEALTHEAST (OUTPATIENT)
Dept: INTERNAL MEDICINE | Facility: CLINIC | Age: 67
End: 2017-11-24

## 2017-11-24 DIAGNOSIS — F41.1 GENERALIZED ANXIETY DISORDER: ICD-10-CM

## 2017-11-27 ENCOUNTER — OFFICE VISIT - HEALTHEAST (OUTPATIENT)
Dept: NEUROSURGERY | Facility: CLINIC | Age: 67
End: 2017-11-27

## 2017-11-27 DIAGNOSIS — S12.100A C2 CERVICAL FRACTURE (H): ICD-10-CM

## 2017-11-27 ASSESSMENT — MIFFLIN-ST. JEOR: SCORE: 1022.56

## 2017-11-29 ENCOUNTER — COMMUNICATION - HEALTHEAST (OUTPATIENT)
Dept: BEHAVIORAL HEALTH | Facility: HOSPITAL | Age: 67
End: 2017-11-29

## 2017-11-29 ENCOUNTER — COMMUNICATION - HEALTHEAST (OUTPATIENT)
Dept: NEUROSURGERY | Facility: CLINIC | Age: 67
End: 2017-11-29

## 2017-12-03 ENCOUNTER — COMMUNICATION - HEALTHEAST (OUTPATIENT)
Dept: INTERNAL MEDICINE | Facility: CLINIC | Age: 67
End: 2017-12-03

## 2017-12-10 ENCOUNTER — COMMUNICATION - HEALTHEAST (OUTPATIENT)
Dept: INTERNAL MEDICINE | Facility: CLINIC | Age: 67
End: 2017-12-10

## 2017-12-10 DIAGNOSIS — S22.20XA CLOSED FRACTURE OF STERNUM, UNSPECIFIED PORTION OF STERNUM, INITIAL ENCOUNTER: ICD-10-CM

## 2017-12-10 DIAGNOSIS — G89.29 OTHER CHRONIC PAIN: ICD-10-CM

## 2017-12-14 ENCOUNTER — COMMUNICATION - HEALTHEAST (OUTPATIENT)
Dept: INTERNAL MEDICINE | Facility: CLINIC | Age: 67
End: 2017-12-14

## 2017-12-15 ENCOUNTER — COMMUNICATION - HEALTHEAST (OUTPATIENT)
Dept: INTERNAL MEDICINE | Facility: CLINIC | Age: 67
End: 2017-12-15

## 2018-01-02 ENCOUNTER — COMMUNICATION - HEALTHEAST (OUTPATIENT)
Dept: INTERNAL MEDICINE | Facility: CLINIC | Age: 68
End: 2018-01-02

## 2018-01-02 DIAGNOSIS — J40 BRONCHITIS: ICD-10-CM

## 2018-01-04 ENCOUNTER — COMMUNICATION - HEALTHEAST (OUTPATIENT)
Dept: INTERNAL MEDICINE | Facility: CLINIC | Age: 68
End: 2018-01-04

## 2018-01-08 ENCOUNTER — HOSPITAL ENCOUNTER (OUTPATIENT)
Dept: RADIOLOGY | Facility: CLINIC | Age: 68
Discharge: HOME | End: 2018-01-08

## 2018-01-08 ENCOUNTER — OFFICE VISIT - HEALTHEAST (OUTPATIENT)
Dept: NEUROSURGERY | Facility: CLINIC | Age: 68
End: 2018-01-08

## 2018-01-08 DIAGNOSIS — S12.100A C2 CERVICAL FRACTURE (H): ICD-10-CM

## 2018-01-08 ASSESSMENT — MIFFLIN-ST. JEOR: SCORE: 986.28

## 2018-01-09 ENCOUNTER — COMMUNICATION - HEALTHEAST (OUTPATIENT)
Dept: NEUROSURGERY | Facility: CLINIC | Age: 68
End: 2018-01-09

## 2018-01-12 ENCOUNTER — COMMUNICATION - HEALTHEAST (OUTPATIENT)
Dept: INTERNAL MEDICINE | Facility: CLINIC | Age: 68
End: 2018-01-12

## 2018-01-12 DIAGNOSIS — S12.101A CLOSED NONDISPLACED FRACTURE OF SECOND CERVICAL VERTEBRA, UNSPECIFIED FRACTURE MORPHOLOGY, INITIAL ENCOUNTER (H): ICD-10-CM

## 2018-01-22 ENCOUNTER — COMMUNICATION - HEALTHEAST (OUTPATIENT)
Dept: BEHAVIORAL HEALTH | Facility: CLINIC | Age: 68
End: 2018-01-22

## 2018-01-25 ENCOUNTER — OFFICE VISIT - HEALTHEAST (OUTPATIENT)
Dept: INTERNAL MEDICINE | Facility: CLINIC | Age: 68
End: 2018-01-25

## 2018-01-25 DIAGNOSIS — F41.1 GENERALIZED ANXIETY DISORDER: ICD-10-CM

## 2018-01-25 DIAGNOSIS — S12.101A CLOSED NONDISPLACED FRACTURE OF SECOND CERVICAL VERTEBRA, UNSPECIFIED FRACTURE MORPHOLOGY, INITIAL ENCOUNTER (H): ICD-10-CM

## 2018-01-25 DIAGNOSIS — E78.5 HYPERLIPIDEMIA, UNSPECIFIED HYPERLIPIDEMIA TYPE: ICD-10-CM

## 2018-01-25 DIAGNOSIS — Z79.899 MEDICATION MANAGEMENT: ICD-10-CM

## 2018-01-25 DIAGNOSIS — M80.00XG OSTEOPOROSIS WITH CURRENT PATHOLOGICAL FRACTURE WITH DELAYED HEALING, UNSPECIFIED OSTEOPOROSIS TYPE, SUBSEQUENT ENCOUNTER: ICD-10-CM

## 2018-01-25 DIAGNOSIS — K21.9 ESOPHAGEAL REFLUX: ICD-10-CM

## 2018-01-25 LAB
AMPHETAMINES UR QL SCN: ABNORMAL
BARBITURATES UR QL: ABNORMAL
BENZODIAZ UR QL: ABNORMAL
CANNABINOIDS UR QL SCN: ABNORMAL
CHOLEST SERPL-MCNC: 205 MG/DL
COCAINE UR QL: ABNORMAL
CREAT UR-MCNC: 142.4 MG/DL
FASTING STATUS PATIENT QL REPORTED: YES
HDLC SERPL-MCNC: 57 MG/DL
LDLC SERPL CALC-MCNC: 129 MG/DL
METHADONE UR QL SCN: ABNORMAL
OPIATES UR QL SCN: ABNORMAL
OXYCODONE UR QL: ABNORMAL
PCP UR QL SCN: ABNORMAL
TRIGL SERPL-MCNC: 97 MG/DL

## 2018-01-26 ENCOUNTER — COMMUNICATION - HEALTHEAST (OUTPATIENT)
Dept: INTERNAL MEDICINE | Facility: CLINIC | Age: 68
End: 2018-01-26

## 2018-01-26 ENCOUNTER — RECORDS - HEALTHEAST (OUTPATIENT)
Dept: ADMINISTRATIVE | Facility: OTHER | Age: 68
End: 2018-01-26

## 2018-01-29 ENCOUNTER — COMMUNICATION - HEALTHEAST (OUTPATIENT)
Dept: INTERNAL MEDICINE | Facility: CLINIC | Age: 68
End: 2018-01-29

## 2018-01-31 ENCOUNTER — COMMUNICATION - HEALTHEAST (OUTPATIENT)
Dept: INTERNAL MEDICINE | Facility: CLINIC | Age: 68
End: 2018-01-31

## 2018-02-02 ENCOUNTER — COMMUNICATION - HEALTHEAST (OUTPATIENT)
Dept: INTERNAL MEDICINE | Facility: CLINIC | Age: 68
End: 2018-02-02

## 2018-02-04 ENCOUNTER — COMMUNICATION - HEALTHEAST (OUTPATIENT)
Dept: INTERNAL MEDICINE | Facility: CLINIC | Age: 68
End: 2018-02-04

## 2018-02-04 DIAGNOSIS — F41.1 GENERALIZED ANXIETY DISORDER: ICD-10-CM

## 2018-02-06 ENCOUNTER — COMMUNICATION - HEALTHEAST (OUTPATIENT)
Dept: INTERNAL MEDICINE | Facility: CLINIC | Age: 68
End: 2018-02-06

## 2018-02-13 ENCOUNTER — COMMUNICATION - HEALTHEAST (OUTPATIENT)
Dept: INTERNAL MEDICINE | Facility: CLINIC | Age: 68
End: 2018-02-13

## 2018-02-19 ENCOUNTER — COMMUNICATION - HEALTHEAST (OUTPATIENT)
Dept: INTERNAL MEDICINE | Facility: CLINIC | Age: 68
End: 2018-02-19

## 2018-02-21 ENCOUNTER — COMMUNICATION - HEALTHEAST (OUTPATIENT)
Dept: INTERNAL MEDICINE | Facility: CLINIC | Age: 68
End: 2018-02-21

## 2018-02-21 DIAGNOSIS — R06.02 SOB (SHORTNESS OF BREATH): ICD-10-CM

## 2018-02-22 ENCOUNTER — COMMUNICATION - HEALTHEAST (OUTPATIENT)
Dept: INTERNAL MEDICINE | Facility: CLINIC | Age: 68
End: 2018-02-22

## 2018-03-04 ENCOUNTER — COMMUNICATION - HEALTHEAST (OUTPATIENT)
Dept: INTERNAL MEDICINE | Facility: CLINIC | Age: 68
End: 2018-03-04

## 2018-03-06 ENCOUNTER — COMMUNICATION - HEALTHEAST (OUTPATIENT)
Dept: INTERNAL MEDICINE | Facility: CLINIC | Age: 68
End: 2018-03-06

## 2018-03-07 ENCOUNTER — COMMUNICATION - HEALTHEAST (OUTPATIENT)
Dept: INTERNAL MEDICINE | Facility: CLINIC | Age: 68
End: 2018-03-07

## 2018-03-08 ENCOUNTER — RECORDS - HEALTHEAST (OUTPATIENT)
Dept: ADMINISTRATIVE | Facility: OTHER | Age: 68
End: 2018-03-08

## 2018-03-09 ENCOUNTER — COMMUNICATION - HEALTHEAST (OUTPATIENT)
Dept: INTERNAL MEDICINE | Facility: CLINIC | Age: 68
End: 2018-03-09

## 2018-03-30 ENCOUNTER — COMMUNICATION - HEALTHEAST (OUTPATIENT)
Dept: INTERNAL MEDICINE | Facility: CLINIC | Age: 68
End: 2018-03-30

## 2018-03-30 DIAGNOSIS — M54.9 SPINE PAIN: ICD-10-CM

## 2018-04-02 ENCOUNTER — COMMUNICATION - HEALTHEAST (OUTPATIENT)
Dept: INTERNAL MEDICINE | Facility: CLINIC | Age: 68
End: 2018-04-02

## 2018-04-25 ENCOUNTER — HOSPITAL ENCOUNTER (OUTPATIENT)
Dept: PHYSICAL MEDICINE AND REHAB | Facility: CLINIC | Age: 68
Discharge: HOME | End: 2018-04-25
Attending: INTERNAL MEDICINE

## 2018-04-25 DIAGNOSIS — Z87.81 HX OF COMPRESSION FRACTURE OF SPINE: ICD-10-CM

## 2018-04-25 DIAGNOSIS — M85.80 OSTEOPENIA: ICD-10-CM

## 2018-04-25 DIAGNOSIS — M54.6 THORACIC SPINE PAIN: ICD-10-CM

## 2018-04-25 DIAGNOSIS — M79.18 MYOFASCIAL PAIN: ICD-10-CM

## 2018-04-25 ASSESSMENT — MIFFLIN-ST. JEOR: SCORE: 1020.75

## 2018-04-26 ENCOUNTER — COMMUNICATION - HEALTHEAST (OUTPATIENT)
Dept: INTERNAL MEDICINE | Facility: CLINIC | Age: 68
End: 2018-04-26

## 2018-04-27 ENCOUNTER — COMMUNICATION - HEALTHEAST (OUTPATIENT)
Dept: INTERNAL MEDICINE | Facility: CLINIC | Age: 68
End: 2018-04-27

## 2018-04-27 DIAGNOSIS — G89.29 CHRONIC PAIN: ICD-10-CM

## 2018-05-04 ENCOUNTER — COMMUNICATION - HEALTHEAST (OUTPATIENT)
Dept: INTERNAL MEDICINE | Facility: CLINIC | Age: 68
End: 2018-05-04

## 2018-05-09 ENCOUNTER — RECORDS - HEALTHEAST (OUTPATIENT)
Dept: ADMINISTRATIVE | Facility: OTHER | Age: 68
End: 2018-05-09

## 2018-05-10 ENCOUNTER — COMMUNICATION - HEALTHEAST (OUTPATIENT)
Dept: INTERNAL MEDICINE | Facility: CLINIC | Age: 68
End: 2018-05-10

## 2018-05-10 DIAGNOSIS — K21.9 ESOPHAGEAL REFLUX: ICD-10-CM

## 2018-05-14 ENCOUNTER — COMMUNICATION - HEALTHEAST (OUTPATIENT)
Dept: INTERNAL MEDICINE | Facility: CLINIC | Age: 68
End: 2018-05-14

## 2018-05-14 DIAGNOSIS — K21.9 ESOPHAGEAL REFLUX: ICD-10-CM

## 2018-05-15 ENCOUNTER — COMMUNICATION - HEALTHEAST (OUTPATIENT)
Dept: INTERNAL MEDICINE | Facility: CLINIC | Age: 68
End: 2018-05-15

## 2018-05-15 DIAGNOSIS — K21.9 ESOPHAGEAL REFLUX: ICD-10-CM

## 2018-05-23 ENCOUNTER — RECORDS - HEALTHEAST (OUTPATIENT)
Dept: ADMINISTRATIVE | Facility: OTHER | Age: 68
End: 2018-05-23

## 2018-06-23 ENCOUNTER — COMMUNICATION - HEALTHEAST (OUTPATIENT)
Dept: INTERNAL MEDICINE | Facility: CLINIC | Age: 68
End: 2018-06-23

## 2018-06-26 ENCOUNTER — COMMUNICATION - HEALTHEAST (OUTPATIENT)
Dept: INTERNAL MEDICINE | Facility: CLINIC | Age: 68
End: 2018-06-26

## 2018-06-27 ENCOUNTER — COMMUNICATION - HEALTHEAST (OUTPATIENT)
Dept: INTERNAL MEDICINE | Facility: CLINIC | Age: 68
End: 2018-06-27

## 2018-07-22 ENCOUNTER — COMMUNICATION - HEALTHEAST (OUTPATIENT)
Dept: INTERNAL MEDICINE | Facility: CLINIC | Age: 68
End: 2018-07-22

## 2018-08-06 ENCOUNTER — COMMUNICATION - HEALTHEAST (OUTPATIENT)
Dept: INTERNAL MEDICINE | Facility: CLINIC | Age: 68
End: 2018-08-06

## 2018-08-13 ENCOUNTER — COMMUNICATION - HEALTHEAST (OUTPATIENT)
Dept: INTERNAL MEDICINE | Facility: CLINIC | Age: 68
End: 2018-08-13

## 2018-08-15 ENCOUNTER — COMMUNICATION - HEALTHEAST (OUTPATIENT)
Dept: INTERNAL MEDICINE | Facility: CLINIC | Age: 68
End: 2018-08-15

## 2018-08-27 ENCOUNTER — COMMUNICATION - HEALTHEAST (OUTPATIENT)
Dept: INTERNAL MEDICINE | Facility: CLINIC | Age: 68
End: 2018-08-27

## 2018-08-27 DIAGNOSIS — R06.02 SOB (SHORTNESS OF BREATH): ICD-10-CM

## 2018-08-28 ENCOUNTER — COMMUNICATION - HEALTHEAST (OUTPATIENT)
Dept: INTERNAL MEDICINE | Facility: CLINIC | Age: 68
End: 2018-08-28

## 2018-09-13 ENCOUNTER — COMMUNICATION - HEALTHEAST (OUTPATIENT)
Dept: INTERNAL MEDICINE | Facility: CLINIC | Age: 68
End: 2018-09-13

## 2018-09-14 ENCOUNTER — COMMUNICATION - HEALTHEAST (OUTPATIENT)
Dept: SCHEDULING | Facility: CLINIC | Age: 68
End: 2018-09-14

## 2018-09-21 ENCOUNTER — COMMUNICATION - HEALTHEAST (OUTPATIENT)
Dept: INTERNAL MEDICINE | Facility: CLINIC | Age: 68
End: 2018-09-21

## 2018-09-28 ENCOUNTER — HOSPITAL ENCOUNTER (OUTPATIENT)
Dept: MAMMOGRAPHY | Facility: CLINIC | Age: 68
Discharge: HOME | End: 2018-09-28
Attending: INTERNAL MEDICINE

## 2018-09-28 DIAGNOSIS — Z12.31 VISIT FOR SCREENING MAMMOGRAM: ICD-10-CM

## 2018-10-07 ENCOUNTER — COMMUNICATION - HEALTHEAST (OUTPATIENT)
Dept: INTERNAL MEDICINE | Facility: CLINIC | Age: 68
End: 2018-10-07

## 2018-10-07 DIAGNOSIS — R06.02 SOB (SHORTNESS OF BREATH): ICD-10-CM

## 2018-10-08 ENCOUNTER — COMMUNICATION - HEALTHEAST (OUTPATIENT)
Dept: INTERNAL MEDICINE | Facility: CLINIC | Age: 68
End: 2018-10-08

## 2018-10-08 DIAGNOSIS — R06.02 SOB (SHORTNESS OF BREATH): ICD-10-CM

## 2018-10-24 ENCOUNTER — COMMUNICATION - HEALTHEAST (OUTPATIENT)
Dept: INTERNAL MEDICINE | Facility: CLINIC | Age: 68
End: 2018-10-24

## 2018-10-25 ENCOUNTER — COMMUNICATION - HEALTHEAST (OUTPATIENT)
Dept: INTERNAL MEDICINE | Facility: CLINIC | Age: 68
End: 2018-10-25

## 2018-10-30 ENCOUNTER — OFFICE VISIT - HEALTHEAST (OUTPATIENT)
Dept: FAMILY MEDICINE | Facility: CLINIC | Age: 68
End: 2018-10-30

## 2018-10-30 DIAGNOSIS — Z79.899 ENCOUNTER FOR LONG-TERM (CURRENT) USE OF MEDICATIONS: ICD-10-CM

## 2018-10-30 DIAGNOSIS — I10 BENIGN ESSENTIAL HYPERTENSION: ICD-10-CM

## 2018-10-30 DIAGNOSIS — M80.00XA AGE-RELATED OSTEOPOROSIS WITH CURRENT PATHOLOGICAL FRACTURE: ICD-10-CM

## 2018-10-30 LAB
ALT SERPL W P-5'-P-CCNC: 11 U/L (ref 0–45)
ANION GAP SERPL CALCULATED.3IONS-SCNC: 13 MMOL/L (ref 5–18)
BASOPHILS # BLD AUTO: 0 THOU/UL (ref 0–0.2)
BASOPHILS NFR BLD AUTO: 0 % (ref 0–2)
BUN SERPL-MCNC: 17 MG/DL (ref 8–22)
CALCIUM SERPL-MCNC: 9.1 MG/DL (ref 8.5–10.5)
CHLORIDE BLD-SCNC: 110 MMOL/L (ref 98–107)
CO2 SERPL-SCNC: 17 MMOL/L (ref 22–31)
CREAT SERPL-MCNC: 0.82 MG/DL (ref 0.6–1.1)
EOSINOPHIL # BLD AUTO: 0 THOU/UL (ref 0–0.4)
EOSINOPHIL NFR BLD AUTO: 0 % (ref 0–6)
ERYTHROCYTE [DISTWIDTH] IN BLOOD BY AUTOMATED COUNT: 19.5 % (ref 11–14.5)
GFR SERPL CREATININE-BSD FRML MDRD: >60 ML/MIN/1.73M2
GLUCOSE BLD-MCNC: 125 MG/DL (ref 70–125)
HCT VFR BLD AUTO: 39.5 % (ref 35–47)
HGB BLD-MCNC: 12.8 G/DL (ref 12–16)
LYMPHOCYTES # BLD AUTO: 0.8 THOU/UL (ref 0.8–4.4)
LYMPHOCYTES NFR BLD AUTO: 7 % (ref 20–40)
MCH RBC QN AUTO: 28.2 PG (ref 27–34)
MCHC RBC AUTO-ENTMCNC: 32.4 G/DL (ref 32–36)
MCV RBC AUTO: 87 FL (ref 80–100)
MONOCYTES # BLD AUTO: 0.2 THOU/UL (ref 0–0.9)
MONOCYTES NFR BLD AUTO: 2 % (ref 2–10)
NEUTROPHILS # BLD AUTO: 9.8 THOU/UL (ref 2–7.7)
NEUTROPHILS NFR BLD AUTO: 91 % (ref 50–70)
PLATELET # BLD AUTO: 393 THOU/UL (ref 140–440)
PMV BLD AUTO: 10.1 FL (ref 8.5–12.5)
POTASSIUM BLD-SCNC: 4 MMOL/L (ref 3.5–5)
RBC # BLD AUTO: 4.54 MILL/UL (ref 3.8–5.4)
SODIUM SERPL-SCNC: 140 MMOL/L (ref 136–145)
WBC: 10.8 THOU/UL (ref 4–11)

## 2018-11-01 ENCOUNTER — COMMUNICATION - HEALTHEAST (OUTPATIENT)
Dept: PEDIATRICS | Facility: CLINIC | Age: 68
End: 2018-11-01

## 2019-01-01 ENCOUNTER — RECORDS - HEALTHEAST (OUTPATIENT)
Dept: ADMINISTRATIVE | Facility: OTHER | Age: 69
End: 2019-01-01

## 2019-01-01 ENCOUNTER — AMBULATORY - HEALTHEAST (OUTPATIENT)
Dept: ENDOCRINOLOGY | Facility: CLINIC | Age: 69
End: 2019-01-01

## 2019-01-01 ENCOUNTER — COMMUNICATION - HEALTHEAST (OUTPATIENT)
Dept: FAMILY MEDICINE | Facility: CLINIC | Age: 69
End: 2019-01-01

## 2019-01-01 ENCOUNTER — OFFICE VISIT - HEALTHEAST (OUTPATIENT)
Dept: FAMILY MEDICINE | Facility: CLINIC | Age: 69
End: 2019-01-01

## 2019-01-01 ENCOUNTER — RECORDS - HEALTHEAST (OUTPATIENT)
Dept: BONE DENSITY | Facility: CLINIC | Age: 69
End: 2019-01-01

## 2019-01-01 ENCOUNTER — AMBULATORY - HEALTHEAST (OUTPATIENT)
Dept: LAB | Facility: CLINIC | Age: 69
End: 2019-01-01

## 2019-01-01 ENCOUNTER — COMMUNICATION - HEALTHEAST (OUTPATIENT)
Dept: CARE COORDINATION | Facility: CLINIC | Age: 69
End: 2019-01-01

## 2019-01-01 ENCOUNTER — COMMUNICATION - HEALTHEAST (OUTPATIENT)
Dept: INTERNAL MEDICINE | Facility: CLINIC | Age: 69
End: 2019-01-01

## 2019-01-01 ENCOUNTER — ANESTHESIA - HEALTHEAST (OUTPATIENT)
Dept: SURGERY | Facility: HOSPITAL | Age: 69
End: 2019-01-01

## 2019-01-01 ENCOUNTER — SURGERY - HEALTHEAST (OUTPATIENT)
Dept: SURGERY | Facility: HOSPITAL | Age: 69
End: 2019-01-01

## 2019-01-01 ENCOUNTER — COMMUNICATION - HEALTHEAST (OUTPATIENT)
Dept: NURSING | Facility: CLINIC | Age: 69
End: 2019-01-01

## 2019-01-01 ENCOUNTER — COMMUNICATION - HEALTHEAST (OUTPATIENT)
Dept: ADMINISTRATIVE | Facility: CLINIC | Age: 69
End: 2019-01-01

## 2019-01-01 ENCOUNTER — HOME CARE/HOSPICE - HEALTHEAST (OUTPATIENT)
Dept: HOME HEALTH SERVICES | Facility: HOME HEALTH | Age: 69
End: 2019-01-01

## 2019-01-01 ENCOUNTER — AMBULATORY - HEALTHEAST (OUTPATIENT)
Dept: CARE COORDINATION | Facility: CLINIC | Age: 69
End: 2019-01-01

## 2019-01-01 ENCOUNTER — COMMUNICATION - HEALTHEAST (OUTPATIENT)
Dept: SCHEDULING | Facility: CLINIC | Age: 69
End: 2019-01-01

## 2019-01-01 ENCOUNTER — COMMUNICATION - HEALTHEAST (OUTPATIENT)
Dept: ENDOCRINOLOGY | Facility: CLINIC | Age: 69
End: 2019-01-01

## 2019-01-01 DIAGNOSIS — R11.0 NAUSEA: ICD-10-CM

## 2019-01-01 DIAGNOSIS — F41.1 GENERALIZED ANXIETY DISORDER: ICD-10-CM

## 2019-01-01 DIAGNOSIS — J20.9 CHRONIC BRONCHITIS WITH ACUTE EXACERBATION (H): ICD-10-CM

## 2019-01-01 DIAGNOSIS — E87.1 HYPONATREMIA: ICD-10-CM

## 2019-01-01 DIAGNOSIS — R06.2 WHEEZING: ICD-10-CM

## 2019-01-01 DIAGNOSIS — M80.00XG AGE-RELATED OSTEOPOROSIS WITH CURRENT PATHOLOGICAL FRACTURE, UNSPECIFIED SITE, SUBSEQUENT ENCOUNTER FOR FRACTURE WITH DELAYED HEALING: ICD-10-CM

## 2019-01-01 DIAGNOSIS — Z12.11 SCREEN FOR COLON CANCER: ICD-10-CM

## 2019-01-01 DIAGNOSIS — J42 CHRONIC BRONCHITIS, UNSPECIFIED CHRONIC BRONCHITIS TYPE (H): ICD-10-CM

## 2019-01-01 DIAGNOSIS — M81.0 AGE RELATED OSTEOPOROSIS, UNSPECIFIED PATHOLOGICAL FRACTURE PRESENCE: ICD-10-CM

## 2019-01-01 DIAGNOSIS — I10 ESSENTIAL HYPERTENSION, BENIGN: ICD-10-CM

## 2019-01-01 DIAGNOSIS — M62.81 MUSCLE WEAKNESS: ICD-10-CM

## 2019-01-01 DIAGNOSIS — R06.02 SOB (SHORTNESS OF BREATH): ICD-10-CM

## 2019-01-01 DIAGNOSIS — J42 CHRONIC BRONCHITIS WITH ACUTE EXACERBATION (H): ICD-10-CM

## 2019-01-01 DIAGNOSIS — R41.89 COGNITIVE DEFICITS: ICD-10-CM

## 2019-01-01 DIAGNOSIS — E87.6 LOW POTASSIUM SYNDROME: ICD-10-CM

## 2019-01-01 DIAGNOSIS — M81.0 SENILE OSTEOPOROSIS: ICD-10-CM

## 2019-01-01 DIAGNOSIS — Z01.818 PRE-OP EXAM: ICD-10-CM

## 2019-01-01 DIAGNOSIS — R41.89 COGNITIVE DECLINE: ICD-10-CM

## 2019-01-01 DIAGNOSIS — S42.309A HUMERUS FRACTURE: ICD-10-CM

## 2019-01-01 DIAGNOSIS — J44.1 OBSTRUCTIVE CHRONIC BRONCHITIS WITH EXACERBATION (H): ICD-10-CM

## 2019-01-01 DIAGNOSIS — R41.3 MEMORY DIFFICULTIES: ICD-10-CM

## 2019-01-01 DIAGNOSIS — F39 EPISODIC MOOD DISORDER (H): ICD-10-CM

## 2019-01-01 DIAGNOSIS — S42.201G CLOSED FRACTURE OF PROXIMAL END OF RIGHT HUMERUS WITH DELAYED HEALING, UNSPECIFIED FRACTURE MORPHOLOGY, SUBSEQUENT ENCOUNTER: ICD-10-CM

## 2019-01-01 DIAGNOSIS — F32.0 MILD MAJOR DEPRESSION (H): ICD-10-CM

## 2019-01-01 DIAGNOSIS — M81.0 AGE-RELATED OSTEOPOROSIS WITHOUT CURRENT PATHOLOGICAL FRACTURE: ICD-10-CM

## 2019-01-01 DIAGNOSIS — I10 ESSENTIAL HYPERTENSION: ICD-10-CM

## 2019-01-01 DIAGNOSIS — J44.1 COPD EXACERBATION (H): ICD-10-CM

## 2019-01-01 DIAGNOSIS — M80.00XG OSTEOPOROSIS WITH CURRENT PATHOLOGICAL FRACTURE WITH DELAYED HEALING, UNSPECIFIED OSTEOPOROSIS TYPE, SUBSEQUENT ENCOUNTER: ICD-10-CM

## 2019-01-01 DIAGNOSIS — A41.51 SEPSIS DUE TO ESCHERICHIA COLI (E. COLI) (H): ICD-10-CM

## 2019-01-01 DIAGNOSIS — E55.9 VITAMIN D DEFICIENCY: ICD-10-CM

## 2019-01-01 DIAGNOSIS — Z79.899 HIGH RISK MEDICATION USE: ICD-10-CM

## 2019-01-01 DIAGNOSIS — K21.9 ESOPHAGEAL REFLUX: ICD-10-CM

## 2019-01-01 DIAGNOSIS — S42.291G OTHER CLOSED DISPLACED FRACTURE OF PROXIMAL END OF RIGHT HUMERUS WITH DELAYED HEALING, SUBSEQUENT ENCOUNTER: ICD-10-CM

## 2019-01-01 DIAGNOSIS — F41.9 ANXIETY: ICD-10-CM

## 2019-01-01 DIAGNOSIS — Z87.891 FORMER CIGARETTE SMOKER: ICD-10-CM

## 2019-01-01 LAB
25(OH)D3 SERPL-MCNC: 20.4 NG/ML (ref 30–80)
ALBUMIN SERPL-MCNC: 3.1 G/DL (ref 3.5–5)
ALBUMIN UR-MCNC: ABNORMAL MG/DL
ANION GAP SERPL CALCULATED.3IONS-SCNC: 10 MMOL/L (ref 5–18)
ANION GAP SERPL CALCULATED.3IONS-SCNC: 10 MMOL/L (ref 5–18)
APPEARANCE UR: CLEAR
BACTERIA #/AREA URNS HPF: ABNORMAL HPF
BILIRUB UR QL STRIP: NEGATIVE
BUN SERPL-MCNC: 18 MG/DL (ref 8–22)
BUN SERPL-MCNC: 27 MG/DL (ref 8–22)
C REACTIVE PROTEIN LHE: 0.3 MG/DL (ref 0–0.8)
CALCIUM SERPL-MCNC: 8.9 MG/DL (ref 8.5–10.5)
CALCIUM SERPL-MCNC: 9.4 MG/DL (ref 8.5–10.5)
CHLORIDE BLD-SCNC: 106 MMOL/L (ref 98–107)
CHLORIDE BLD-SCNC: 113 MMOL/L (ref 98–107)
CHOLEST SERPL-MCNC: 199 MG/DL
CO2 SERPL-SCNC: 17 MMOL/L (ref 22–31)
CO2 SERPL-SCNC: 24 MMOL/L (ref 22–31)
COLOR UR AUTO: YELLOW
CREAT SERPL-MCNC: 0.85 MG/DL (ref 0.6–1.1)
CREAT SERPL-MCNC: 0.87 MG/DL (ref 0.6–1.1)
ERYTHROCYTE [DISTWIDTH] IN BLOOD BY AUTOMATED COUNT: 13.8 % (ref 11–14.5)
ERYTHROCYTE [SEDIMENTATION RATE] IN BLOOD BY WESTERGREN METHOD: 4 MM/HR (ref 0–20)
FASTING STATUS PATIENT QL REPORTED: NORMAL
FASTING STATUS PATIENT QL REPORTED: NORMAL
GFR SERPL CREATININE-BSD FRML MDRD: >60 ML/MIN/1.73M2
GFR SERPL CREATININE-BSD FRML MDRD: >60 ML/MIN/1.73M2
GLUCOSE BLD-MCNC: 77 MG/DL (ref 70–125)
GLUCOSE BLD-MCNC: 77 MG/DL (ref 70–125)
GLUCOSE BLD-MCNC: 97 MG/DL (ref 70–125)
GLUCOSE UR STRIP-MCNC: NEGATIVE MG/DL
HCT VFR BLD AUTO: 38.2 % (ref 35–47)
HDLC SERPL-MCNC: 67 MG/DL
HGB BLD-MCNC: 12.3 G/DL (ref 12–16)
HGB UR QL STRIP: NEGATIVE
KETONES UR STRIP-MCNC: NEGATIVE MG/DL
LDLC SERPL CALC-MCNC: 111 MG/DL
LEUKOCYTE ESTERASE UR QL STRIP: NEGATIVE
MCH RBC QN AUTO: 26.7 PG (ref 27–34)
MCHC RBC AUTO-ENTMCNC: 32.1 G/DL (ref 32–36)
MCV RBC AUTO: 83 FL (ref 80–100)
NITRATE UR QL: NEGATIVE
PH UR STRIP: 7 [PH] (ref 5–8)
PLATELET # BLD AUTO: 392 THOU/UL (ref 140–440)
PMV BLD AUTO: 8.3 FL (ref 7–10)
POTASSIUM BLD-SCNC: 4.7 MMOL/L (ref 3.5–5)
POTASSIUM BLD-SCNC: 6.3 MMOL/L (ref 3.5–5)
PREALB SERPL-MCNC: 25.7 MG/DL (ref 19–38)
PROLACTIN SERPL-MCNC: 17.6 NG/ML (ref 0–20)
RBC # BLD AUTO: 4.58 MILL/UL (ref 3.8–5.4)
RBC #/AREA URNS AUTO: ABNORMAL HPF
SODIUM SERPL-SCNC: 140 MMOL/L (ref 136–145)
SODIUM SERPL-SCNC: 140 MMOL/L (ref 136–145)
SP GR UR STRIP: 1.01 (ref 1–1.03)
SQUAMOUS #/AREA URNS AUTO: ABNORMAL LPF
TRIGL SERPL-MCNC: 103 MG/DL
TSH SERPL DL<=0.005 MIU/L-ACNC: 1.46 UIU/ML (ref 0.3–5)
UROBILINOGEN UR STRIP-ACNC: ABNORMAL
WBC #/AREA URNS AUTO: ABNORMAL HPF
WBC: 9.8 THOU/UL (ref 4–11)

## 2019-01-01 RX ORDER — ALBUTEROL SULFATE 90 UG/1
AEROSOL, METERED RESPIRATORY (INHALATION)
Qty: 36 G | Refills: 4 | Status: SHIPPED | OUTPATIENT
Start: 2019-01-01

## 2019-01-01 RX ORDER — ALENDRONATE SODIUM 70 MG/1
70 TABLET ORAL
Qty: 13 TABLET | Refills: 3 | Status: SHIPPED | OUTPATIENT
Start: 2019-01-01

## 2019-01-01 RX ORDER — VENLAFAXINE HYDROCHLORIDE 150 MG/1
300 CAPSULE, EXTENDED RELEASE ORAL DAILY
Qty: 180 CAPSULE | Status: SHIPPED | OUTPATIENT
Start: 2019-01-01 | End: 2020-03-25

## 2019-01-01 RX ORDER — BUDESONIDE AND FORMOTEROL FUMARATE DIHYDRATE 160; 4.5 UG/1; UG/1
2 AEROSOL RESPIRATORY (INHALATION) 2 TIMES DAILY
Qty: 1 INHALER | Refills: 12 | Status: SHIPPED | OUTPATIENT
Start: 2019-01-01

## 2019-01-01 RX ORDER — ERGOCALCIFEROL 1.25 MG/1
CAPSULE ORAL
Qty: 12 CAPSULE | Refills: 5 | Status: SHIPPED | OUTPATIENT
Start: 2019-01-01

## 2019-01-01 RX ORDER — METOPROLOL TARTRATE 100 MG
100 TABLET ORAL 2 TIMES DAILY
Qty: 180 TABLET | Refills: 3 | Status: SHIPPED | OUTPATIENT
Start: 2019-01-01

## 2019-01-01 ASSESSMENT — MIFFLIN-ST. JEOR
SCORE: 980.83
SCORE: 994.44
SCORE: 977.2
SCORE: 936.38
SCORE: 999.43

## 2019-01-03 ASSESSMENT — MIFFLIN-ST. JEOR: SCORE: 998.07

## 2019-01-04 ENCOUNTER — SURGERY - HEALTHEAST (OUTPATIENT)
Dept: SURGERY | Facility: HOSPITAL | Age: 69
End: 2019-01-04

## 2019-01-04 ENCOUNTER — ANESTHESIA - HEALTHEAST (OUTPATIENT)
Dept: SURGERY | Facility: HOSPITAL | Age: 69
End: 2019-01-04

## 2019-01-05 ASSESSMENT — MIFFLIN-ST. JEOR: SCORE: 1056.58

## 2019-01-06 ASSESSMENT — MIFFLIN-ST. JEOR: SCORE: 1056.58

## 2019-01-09 ENCOUNTER — OFFICE VISIT - HEALTHEAST (OUTPATIENT)
Dept: GERIATRICS | Facility: CLINIC | Age: 69
End: 2019-01-09

## 2019-01-09 DIAGNOSIS — R52 PAIN MANAGEMENT: ICD-10-CM

## 2019-01-09 DIAGNOSIS — E87.5 HYPERKALEMIA: ICD-10-CM

## 2019-01-09 DIAGNOSIS — Z98.890 HISTORY OF OPEN REDUCTION AND INTERNAL FIXATION (ORIF) PROCEDURE: ICD-10-CM

## 2019-01-09 DIAGNOSIS — D62 ACUTE BLOOD LOSS ANEMIA: ICD-10-CM

## 2019-01-09 DIAGNOSIS — Z87.81 HISTORY OF RIGHT SHOULDER FRACTURE: ICD-10-CM

## 2019-01-09 DIAGNOSIS — T79.6XXD TRAUMATIC RHABDOMYOLYSIS, SUBSEQUENT ENCOUNTER: ICD-10-CM

## 2019-01-09 DIAGNOSIS — Z87.09 HISTORY OF COPD: ICD-10-CM

## 2019-01-09 DIAGNOSIS — E87.1 HYPONATREMIA: ICD-10-CM

## 2019-01-09 DIAGNOSIS — E44.0 MODERATE PROTEIN-CALORIE MALNUTRITION (H): ICD-10-CM

## 2019-01-10 ENCOUNTER — OFFICE VISIT - HEALTHEAST (OUTPATIENT)
Dept: GERIATRICS | Facility: CLINIC | Age: 69
End: 2019-01-10

## 2019-01-10 ENCOUNTER — RECORDS - HEALTHEAST (OUTPATIENT)
Dept: LAB | Facility: CLINIC | Age: 69
End: 2019-01-10

## 2019-01-10 DIAGNOSIS — R26.81 GAIT INSTABILITY: ICD-10-CM

## 2019-01-10 DIAGNOSIS — I10 ESSENTIAL HYPERTENSION: ICD-10-CM

## 2019-01-10 DIAGNOSIS — D62 ANEMIA ASSOCIATED WITH ACUTE BLOOD LOSS: ICD-10-CM

## 2019-01-10 DIAGNOSIS — S42.201G CLOSED FRACTURE OF PROXIMAL END OF RIGHT HUMERUS WITH DELAYED HEALING, UNSPECIFIED FRACTURE MORPHOLOGY, SUBSEQUENT ENCOUNTER: ICD-10-CM

## 2019-01-10 LAB
ANION GAP SERPL CALCULATED.3IONS-SCNC: 8 MMOL/L (ref 5–18)
BUN SERPL-MCNC: 8 MG/DL (ref 8–22)
CALCIUM SERPL-MCNC: 9 MG/DL (ref 8.5–10.5)
CHLORIDE BLD-SCNC: 113 MMOL/L (ref 98–107)
CK SERPL-CCNC: 129 U/L (ref 30–190)
CO2 SERPL-SCNC: 25 MMOL/L (ref 22–31)
CREAT SERPL-MCNC: 0.61 MG/DL (ref 0.6–1.1)
GFR SERPL CREATININE-BSD FRML MDRD: >60 ML/MIN/1.73M2
GLUCOSE BLD-MCNC: 92 MG/DL (ref 70–125)
POTASSIUM BLD-SCNC: 3.5 MMOL/L (ref 3.5–5)
SODIUM SERPL-SCNC: 146 MMOL/L (ref 136–145)

## 2019-01-11 ENCOUNTER — RECORDS - HEALTHEAST (OUTPATIENT)
Dept: LAB | Facility: CLINIC | Age: 69
End: 2019-01-11

## 2019-01-14 ENCOUNTER — OFFICE VISIT - HEALTHEAST (OUTPATIENT)
Dept: GERIATRICS | Facility: CLINIC | Age: 69
End: 2019-01-14

## 2019-01-14 DIAGNOSIS — S42.201G CLOSED FRACTURE OF PROXIMAL END OF RIGHT HUMERUS WITH DELAYED HEALING, UNSPECIFIED FRACTURE MORPHOLOGY, SUBSEQUENT ENCOUNTER: ICD-10-CM

## 2019-01-14 DIAGNOSIS — F41.1 GAD (GENERALIZED ANXIETY DISORDER): ICD-10-CM

## 2019-01-14 DIAGNOSIS — I10 ESSENTIAL HYPERTENSION: ICD-10-CM

## 2019-01-14 DIAGNOSIS — F06.30 MOOD DISORDER DUE TO A GENERAL MEDICAL CONDITION: ICD-10-CM

## 2019-01-14 LAB
ANION GAP SERPL CALCULATED.3IONS-SCNC: 9 MMOL/L (ref 5–18)
BUN SERPL-MCNC: 12 MG/DL (ref 8–22)
CALCIUM SERPL-MCNC: 8.7 MG/DL (ref 8.5–10.5)
CHLORIDE BLD-SCNC: 111 MMOL/L (ref 98–107)
CO2 SERPL-SCNC: 22 MMOL/L (ref 22–31)
CREAT SERPL-MCNC: 0.68 MG/DL (ref 0.6–1.1)
GFR SERPL CREATININE-BSD FRML MDRD: >60 ML/MIN/1.73M2
GLUCOSE BLD-MCNC: 79 MG/DL (ref 70–125)
HGB BLD-MCNC: 8.9 G/DL (ref 12–16)
POTASSIUM BLD-SCNC: 3.8 MMOL/L (ref 3.5–5)
SODIUM SERPL-SCNC: 142 MMOL/L (ref 136–145)

## 2019-01-17 ENCOUNTER — OFFICE VISIT - HEALTHEAST (OUTPATIENT)
Dept: GERIATRICS | Facility: CLINIC | Age: 69
End: 2019-01-17

## 2019-01-17 DIAGNOSIS — S42.201G CLOSED FRACTURE OF PROXIMAL END OF RIGHT HUMERUS WITH DELAYED HEALING, UNSPECIFIED FRACTURE MORPHOLOGY, SUBSEQUENT ENCOUNTER: ICD-10-CM

## 2019-01-17 DIAGNOSIS — F06.30 MOOD DISORDER DUE TO A GENERAL MEDICAL CONDITION: ICD-10-CM

## 2019-01-17 DIAGNOSIS — B00.9 HERPES SIMPLEX VIRUS (HSV) INFECTION: ICD-10-CM

## 2019-01-17 DIAGNOSIS — I10 ESSENTIAL HYPERTENSION: ICD-10-CM

## 2019-01-21 ENCOUNTER — OFFICE VISIT - HEALTHEAST (OUTPATIENT)
Dept: GERIATRICS | Facility: CLINIC | Age: 69
End: 2019-01-21

## 2019-01-21 DIAGNOSIS — K21.9 GASTROESOPHAGEAL REFLUX DISEASE, ESOPHAGITIS PRESENCE NOT SPECIFIED: ICD-10-CM

## 2019-01-21 DIAGNOSIS — S42.201G CLOSED FRACTURE OF PROXIMAL END OF RIGHT HUMERUS WITH DELAYED HEALING, UNSPECIFIED FRACTURE MORPHOLOGY, SUBSEQUENT ENCOUNTER: ICD-10-CM

## 2019-01-21 DIAGNOSIS — I10 ESSENTIAL HYPERTENSION: ICD-10-CM

## 2019-01-21 DIAGNOSIS — F41.1 GAD (GENERALIZED ANXIETY DISORDER): ICD-10-CM

## 2019-01-24 ENCOUNTER — AMBULATORY - HEALTHEAST (OUTPATIENT)
Dept: GERIATRICS | Facility: CLINIC | Age: 69
End: 2019-01-24

## 2019-01-25 ENCOUNTER — COMMUNICATION - HEALTHEAST (OUTPATIENT)
Dept: GERIATRICS | Facility: CLINIC | Age: 69
End: 2019-01-25

## 2019-01-30 ENCOUNTER — COMMUNICATION - HEALTHEAST (OUTPATIENT)
Dept: FAMILY MEDICINE | Facility: CLINIC | Age: 69
End: 2019-01-30

## 2019-01-31 ENCOUNTER — COMMUNICATION - HEALTHEAST (OUTPATIENT)
Dept: ALLERGY | Facility: CLINIC | Age: 69
End: 2019-01-31

## 2019-02-02 ENCOUNTER — RECORDS - HEALTHEAST (OUTPATIENT)
Dept: ADMINISTRATIVE | Facility: OTHER | Age: 69
End: 2019-02-02

## 2019-02-13 ENCOUNTER — COMMUNICATION - HEALTHEAST (OUTPATIENT)
Dept: FAMILY MEDICINE | Facility: CLINIC | Age: 69
End: 2019-02-13

## 2019-02-25 ENCOUNTER — COMMUNICATION - HEALTHEAST (OUTPATIENT)
Dept: FAMILY MEDICINE | Facility: CLINIC | Age: 69
End: 2019-02-25

## 2019-02-25 DIAGNOSIS — G47.00 INSOMNIA, UNSPECIFIED TYPE: ICD-10-CM

## 2019-02-25 DIAGNOSIS — F41.1 GENERALIZED ANXIETY DISORDER: ICD-10-CM

## 2020-01-01 ENCOUNTER — AMBULATORY - HEALTHEAST (OUTPATIENT)
Dept: CARE COORDINATION | Facility: CLINIC | Age: 70
End: 2020-01-01

## 2020-01-01 ENCOUNTER — COMMUNICATION - HEALTHEAST (OUTPATIENT)
Dept: NURSING | Facility: CLINIC | Age: 70
End: 2020-01-01

## 2020-01-01 ENCOUNTER — COMMUNICATION - HEALTHEAST (OUTPATIENT)
Dept: RESPIRATORY THERAPY | Facility: HOSPITAL | Age: 70
End: 2020-01-01

## 2020-01-01 ENCOUNTER — COMMUNICATION - HEALTHEAST (OUTPATIENT)
Dept: FAMILY MEDICINE | Facility: CLINIC | Age: 70
End: 2020-01-01

## 2020-01-01 ENCOUNTER — COMMUNICATION - HEALTHEAST (OUTPATIENT)
Dept: RESPIRATORY THERAPY | Facility: CLINIC | Age: 70
End: 2020-01-01

## 2020-01-01 ENCOUNTER — OFFICE VISIT - HEALTHEAST (OUTPATIENT)
Dept: INTERNAL MEDICINE | Facility: CLINIC | Age: 70
End: 2020-01-01

## 2020-01-01 ENCOUNTER — COMMUNICATION - HEALTHEAST (OUTPATIENT)
Dept: CARE COORDINATION | Facility: CLINIC | Age: 70
End: 2020-01-01

## 2020-01-01 ENCOUNTER — COMMUNICATION - HEALTHEAST (OUTPATIENT)
Dept: SCHEDULING | Facility: CLINIC | Age: 70
End: 2020-01-01

## 2020-01-01 DIAGNOSIS — J44.1 COPD EXACERBATION (H): ICD-10-CM

## 2020-01-01 DIAGNOSIS — E61.1 IRON DEFICIENCY: ICD-10-CM

## 2020-01-01 DIAGNOSIS — F41.9 ANXIETY: ICD-10-CM

## 2020-01-01 DIAGNOSIS — Z71.6 ENCOUNTER FOR SMOKING CESSATION COUNSELING: ICD-10-CM

## 2020-01-01 DIAGNOSIS — J18.9 PNEUMONIA OF RIGHT MIDDLE LOBE DUE TO INFECTIOUS ORGANISM: ICD-10-CM

## 2020-01-01 DIAGNOSIS — F33.1 MAJOR DEPRESSIVE DISORDER, RECURRENT EPISODE, MODERATE (H): ICD-10-CM

## 2020-01-01 DIAGNOSIS — F39 EPISODIC MOOD DISORDER (H): ICD-10-CM

## 2020-01-01 DIAGNOSIS — R11.2 NAUSEA AND VOMITING, INTRACTABILITY OF VOMITING NOT SPECIFIED, UNSPECIFIED VOMITING TYPE: ICD-10-CM

## 2020-01-01 RX ORDER — BUPROPION HYDROCHLORIDE 150 MG/1
TABLET, EXTENDED RELEASE ORAL
Qty: 60 TABLET | Refills: 1 | Status: SHIPPED | OUTPATIENT
Start: 2020-01-01

## 2020-01-01 ASSESSMENT — MIFFLIN-ST. JEOR
SCORE: 1004.42
SCORE: 1049.33

## 2020-03-02 ENCOUNTER — AMBULATORY - HEALTHEAST (OUTPATIENT)
Dept: PEDIATRICS | Facility: CLINIC | Age: 70
End: 2020-03-02

## 2020-03-02 ENCOUNTER — COMMUNICATION - HEALTHEAST (OUTPATIENT)
Dept: RESPIRATORY THERAPY | Facility: CLINIC | Age: 70
End: 2020-03-02

## 2020-03-03 ENCOUNTER — COMMUNICATION - HEALTHEAST (OUTPATIENT)
Dept: INTERNAL MEDICINE | Facility: CLINIC | Age: 70
End: 2020-03-03

## 2020-03-06 ENCOUNTER — COMMUNICATION - HEALTHEAST (OUTPATIENT)
Dept: INTERNAL MEDICINE | Facility: CLINIC | Age: 70
End: 2020-03-06

## 2020-03-09 ENCOUNTER — COMMUNICATION - HEALTHEAST (OUTPATIENT)
Dept: INTERNAL MEDICINE | Facility: CLINIC | Age: 70
End: 2020-03-09

## 2020-03-12 ENCOUNTER — COMMUNICATION - HEALTHEAST (OUTPATIENT)
Dept: CARE COORDINATION | Facility: CLINIC | Age: 70
End: 2020-03-12

## 2020-04-16 ENCOUNTER — AMBULATORY - HEALTHEAST (OUTPATIENT)
Dept: INTERNAL MEDICINE | Facility: CLINIC | Age: 70
End: 2020-04-16

## 2020-06-15 ENCOUNTER — COMMUNICATION - HEALTHEAST (OUTPATIENT)
Dept: INTERNAL MEDICINE | Facility: CLINIC | Age: 70
End: 2020-06-15

## 2020-06-19 ENCOUNTER — COMMUNICATION - HEALTHEAST (OUTPATIENT)
Dept: INTERNAL MEDICINE | Facility: CLINIC | Age: 70
End: 2020-06-19

## 2020-07-17 ENCOUNTER — COMMUNICATION - HEALTHEAST (OUTPATIENT)
Dept: RESPIRATORY THERAPY | Facility: CLINIC | Age: 70
End: 2020-07-17

## 2020-08-18 ENCOUNTER — COMMUNICATION - HEALTHEAST (OUTPATIENT)
Dept: RESPIRATORY THERAPY | Facility: CLINIC | Age: 70
End: 2020-08-18

## 2021-05-25 ENCOUNTER — RECORDS - HEALTHEAST (OUTPATIENT)
Dept: ADMINISTRATIVE | Facility: CLINIC | Age: 71
End: 2021-05-25

## 2021-05-26 ENCOUNTER — RECORDS - HEALTHEAST (OUTPATIENT)
Dept: ADMINISTRATIVE | Facility: CLINIC | Age: 71
End: 2021-05-26

## 2021-05-27 ENCOUNTER — RECORDS - HEALTHEAST (OUTPATIENT)
Dept: ADMINISTRATIVE | Facility: CLINIC | Age: 71
End: 2021-05-27

## 2021-05-27 NOTE — TELEPHONE ENCOUNTER
Spoke with patient to let her know that the letter for the DMV is complete. She stated she will pick this up tomorrow.
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
## 2021-05-28 ENCOUNTER — RECORDS - HEALTHEAST (OUTPATIENT)
Dept: ADMINISTRATIVE | Facility: CLINIC | Age: 71
End: 2021-05-28

## 2021-05-28 NOTE — ANESTHESIA CARE TRANSFER NOTE
Last vitals:   Vitals:    05/03/19 1430   BP: 146/90   Pulse: 78   Resp: 20   Temp: 37.2  C (98.9  F)   SpO2: 98%     Patient's level of consciousness is drowsy  Spontaneous respirations: yes  Maintains airway independently: yes  Dentition unchanged: yes  Oropharynx: oropharynx clear of all foreign objects    QCDR Measures:  ASA# 20 - Surgical Safety Checklist: WHO surgical safety checklist completed prior to induction    PQRS# 430 - Adult PONV Prevention: 4558F - Pt received => 2 anti-emetic agents (different classes) preop & intraop  ASA# 8 - Peds PONV Prevention: NA - Not pediatric patient, not GA or 2 or more risk factors NOT present  PQRS# 424 - Bryanna-op Temp Management: 4559F - At least one body temp DOCUMENTED => 35.5C or 95.9F within required timeframe  PQRS# 426 - PACU Transfer Protocol: - Transfer of care checklist used  ASA# 14 - Acute Post-op Pain: ASA14B - Patient did NOT experience pain >= 7 out of 10

## 2021-05-28 NOTE — PROGRESS NOTES
Preoperative Exam    Scheduled Procedure: Right Shoulder Pin Removal   Surgery Date:  04/26/2019  Surgery Location: [facility], fax [phone]    Surgeon:  Dr. Noah Browning     Assessment/Plan:     1. Pre-op exam    - Basic Metabolic Panel; Future  - HM2(CBC w/o Differential)  - XR Chest 2 Views    2. Other closed displaced fracture of proximal end of right humerus with delayed healing, subsequent encounter    - Basic Metabolic Panel; Future  - HM2(CBC w/o Differential)  - XR Chest 2 Views     Surgical Procedure Risk: Intermediate (reported cardiac risk generally 1-5%)  Have you had prior anesthesia?: Yes  Have you or any family members had a previous anesthesia reaction:  No  Do you or any family members have a history of a clotting or bleeding disorder?: No  Cardiac Risk Assessment: no increased risk for major cardiac complications    Patient approved for surgery with general or local anesthesia.    Please Note:  no oxygen or C pap  use.    Functional Status: Independent  Patient plans to recover at home with family.     Subjective:      Yaneth Holguin is a 69 y.o. female who presents for a preoperative consultation.  She will be having a pin removed due to right shoulder pain and damage caused to the cartilage of the humerus by this pin. She is a former smoker and quit in January 2019. She dose not have shortness of breath and is not on oxygen. She uses Symbicort daily and has not needed her albuterol recently. She has not cough or fever and no chest pain, palpations or COTA.     All other systems reviewed and are negative, other than those listed in the HPI.    Pertinent History  Do you have difficulty breathing or chest pain after walking up a flight of stairs: No  History of obstructive sleep apnea: No  Steroid use in the last 6 months: No  Frequent Aspirin/NSAID use: No  Prior Blood Transfusion: Yes: 2007 hip replacement   Prior Blood Transfusion Reaction: No  If for some reason prior to, during or  after the procedure, if it is medically indicated, would you be willing to have a blood transfusion?:  There is no transfusion refusal.    Current Outpatient Medications   Medication Sig Dispense Refill     albuterol (VENTOLIN HFA) 90 mcg/actuation inhaler INHALE 1 TO 2 PUFFS BY MOUTH EVERY 4 TO 6 HOURS AS NEEDED 36 g 4     alendronate (FOSAMAX) 70 MG tablet Take 70 mg by mouth every 7 days. On Thursday. Take in the morning on an empty stomach with a full glass of water 30 minutes before food       budesonide-formoterol (SYMBICORT) 160-4.5 mcg/actuation inhaler Inhale 2 puffs 2 (two) times a day. 1 Inhaler 12     docosanol (ABREVA) 10 % Crea Apply 1 application topically 5 x daily PRN (for cold sores).       ergocalciferol (VITAMIN D2) 50,000 unit capsule TAKE 1 CAPSULE BY MOUTH ONCE A WEEK (Patient taking differently: Take 50,000 Units by mouth once a week. On Thursday      ) 12 capsule 5     metoprolol tartrate (LOPRESSOR) 100 MG tablet Take 1 tablet (100 mg total) by mouth 2 (two) times a day. 180 tablet 3     omeprazole (PRILOSEC) 20 MG capsule Take 1 capsule (20 mg total) by mouth 2 (two) times a day before meals. 180 capsule 3     ondansetron (ZOFRAN ODT) 4 MG disintegrating tablet Take 1 tablet (4 mg total) by mouth every 8 (eight) hours as needed for nausea. 30 tablet 0     ondansetron (ZOFRAN-ODT) 4 MG disintegrating tablet PLACE 1 TABLET IN MOUTH EVERY 8 HOURS AS NEEDED FOR NAUSEA 30 tablet 0     QUEtiapine (SEROQUEL) 100 MG tablet Take 2 tablets (200 mg total) by mouth at bedtime. 60 tablet 3     QUEtiapine (SEROQUEL) 25 MG tablet Take 25 mg by mouth 2 (two) times a day.       venlafaxine (EFFEXOR-XR) 150 MG 24 hr capsule Take 2 capsules (300 mg total) by mouth daily. 180 capsule prn     No current facility-administered medications for this visit.         Allergies   Allergen Reactions     Niacin Preparations Itching     Penicillins Itching     Pt states itching and welts as reaction.     Nitrofurantoin  Monohyd/M-Cryst Other (See Comments)     Reaction unknown     Adhesive Tape-Silicones Hives       Patient Active Problem List   Diagnosis     Hypertension     Osteoporosis     Mixed hyperlipidemia     Herpes Simplex     Avascular Necrosis     Generalized anxiety disorder     Esophageal Reflux     Nephrolithiasis     Benzodiazepine dependence (H)     Drug-seeking behavior     C2 cervical fracture (H)     Closed fracture of sternum, unspecified portion of sternum, initial encounter     ONEIDA (generalized anxiety disorder)     Mood disorder due to a general medical condition     Insomnia     Closed fracture of proximal end of right humerus with delayed healing, unspecified fracture morphology, subsequent encounter     Hyperkalemia     Hyponatremia     CHAR (acute kidney injury) (H)     Smoker     Closed 4-part fracture of proximal humerus with delayed healing, right     Anemia associated with acute blood loss       Past Medical History:   Diagnosis Date     Anxiety      COPD (chronic obstructive pulmonary disease) (H)      Distal radius fracture 05/30/2015    Left Distal (ace wrapped on admission). Fell when climbing stairs     Endometriosis      Fall 05/30/2015     Gastritis      GERD (gastroesophageal reflux disease)      History of kidney stones      History of transfusion      Hyperlipidemia      Hypertension      Insomnia      Left elbow fracture 02/2015     Osteoporosis      Shortness of breath     with exertion       Past Surgical History:   Procedure Laterality Date     HYSTERECTOMY       OOPHORECTOMY       IA EVACUATE MOLE OF UTERUS      Description: Dilation And Curettage For Hydatidiform Mole;  Recorded: 08/06/2008;     IA REMOVE TONSILS/ADENOIDS,<13 Y/O      Description: Tonsillectomy With Adenoidectomy;  Recorded: 08/06/2008;     IA TOTAL ABDOM HYSTERECTOMY  1986    Description: Total Abdominal Hysterectomy;  Recorded: 08/30/2010;     IA TOTAL HIP ARTHROPLASTY Bilateral     Description: Total Hip Replacement;   Recorded: 2008;  Comments: for AVN; Lt.  and had to be redone because of Fx; Rt. done      right humeral fracture  2019     VERTEBROPLASTY  2015    T4     WRIST FRACTURE SURGERY Left 2015       Social History     Socioeconomic History     Marital status:      Spouse name: Not on file     Number of children: Not on file     Years of education: Not on file     Highest education level: Not on file   Occupational History     Not on file   Social Needs     Financial resource strain: Not on file     Food insecurity:     Worry: Not on file     Inability: Not on file     Transportation needs:     Medical: Not on file     Non-medical: Not on file   Tobacco Use     Smoking status: Former Smoker     Packs/day: 0.50     Years: 45.00     Pack years: 22.50     Types: Cigarettes     Start date: 1970     Last attempt to quit: 2019     Years since quittin.3     Smokeless tobacco: Former User   Substance and Sexual Activity     Alcohol use: No     Drug use: No     Sexual activity: Not Currently   Lifestyle     Physical activity:     Days per week: Not on file     Minutes per session: Not on file     Stress: Not on file   Relationships     Social connections:     Talks on phone: Not on file     Gets together: Not on file     Attends [employer] service: Not on file     Active member of club or organization: Not on file     Attends meetings of clubs or organizations: Not on file     Relationship status: Not on file     Intimate partner violence:     Fear of current or ex partner: Not on file     Emotionally abused: Not on file     Physically abused: Not on file     Forced sexual activity: Not on file   Other Topics Concern     Not on file   Social History Narrative    She is single and is retired (2017) from [employer] in [facility] [employer] [facility].  She has 2 children and 4 grandchildren.       Patient Care Team:  Paola Soria PA-C as PCP - General (Physician Assistant)          Objective:      Vitals:    04/25/19 1117   BP: 110/58   Pulse: 66   Temp: 98.3  F (36.8  C)   TempSrc: Oral   SpO2: 99%   Weight: 103 lb 8 oz (46.9 kg)   LMP: 10/29/1986         Physical Exam:  Alert, cooperative, well-hydrated.  Appears well.  Eyes: Pupils equal, round, reactive to light.  HEENT: Sclera white, nares patent, MMM   Lungs: Clear to auscultation. No retractions, no increased work of respiration, equal chest rise.   Heart: Regular rate and rhythm, no murmurs, clicks,    Gallops.  Abdomen: Soft, bowel sounds in 4 quadrants with no tenderness to palpation, no organomegaly or masses, no aortic or renal bruits.  Extremities: no tenderness to palpation of gastrocnemius, bilaterally.  Skin: no increased warmth, edema, or erythema of lower legs bilaterally.  Back:  No cervical, thoracic or lumbar tenderness to spinous processes or musculature.    Neuro: pupils equal and reactive to light bilaterally, CN II - XII  intact. No focal motor/sensory deficits.Rhomberg negative. M/S 5/5 all extremities. DTR 2/4 all extremities    There are no Patient Instructions on file for this visit.        Labs:  No results found for this or any previous visit (from the past 24 hour(s)).    Immunization History   Administered Date(s) Administered     DT (pediatric) 09/01/1988, 01/01/2005     Influenza high dose, seasonal 08/31/2017     Pneumo Conj 13-V (2010&after) 08/31/2017     Td,adult,historic,unspecified 01/01/2005     Tdap 04/29/2016           Electronically signed by Paola Soria PA-C 04/25/19 11:20 AM

## 2021-05-28 NOTE — ANESTHESIA PROCEDURE NOTES
Peripheral Block    Patient location during procedure: pre-op  Start time: 5/3/2019 12:02 PM  End time: 5/3/2019 12:07 PM  post-op analgesia per surgeon order as noted in medical record  Staffing:  Performing  Anesthesiologist: Regan Costa MD  Preanesthetic Checklist  Completed: patient identified, site marked, risks, benefits, and alternatives discussed, timeout performed, consent obtained, airway assessed, oxygen available, suction available, emergency drugs available and hand hygiene performed  Peripheral Block  Block type: brachial plexus  Prep: ChloraPrep  Patient position: sitting  Patient monitoring: cardiac monitor, continuous pulse oximetry, heart rate and blood pressure  Laterality: right  Injection technique: ultrasound guided    Ultrasound used to visualize needle placement in proximity to nerve being blocked: yes   Permanent ultrasound image captured for medical record  Sterile gel and probe cover used for ultrasound.    Needle  Needle type: Stimuplex   Needle gauge: 20G  Needle length: 4 in  no peripheral nerve catheter placed  Assessment  Injection assessment: no difficulty with injection, negative aspiration for heme, no paresthesia on injection and incremental injection

## 2021-05-28 NOTE — ANESTHESIA POSTPROCEDURE EVALUATION
Patient: Yaneth Holguin  REMOVAL, HARDWARE, RIGHT HUMERUS  Anesthesia type: general    Patient location: PACU  Last vitals:   Vitals Value Taken Time   /63 5/3/2019  3:00 PM   Temp  5/3/2019  3:05 PM   Pulse 62 5/3/2019  3:03 PM   Resp 29 5/3/2019  3:03 PM   SpO2 96 % 5/3/2019  3:03 PM   Vitals shown include unvalidated device data.  Post vital signs: stable  Level of consciousness: awake, alert and responds to simple questions  Post-anesthesia pain: pain controlled  Post-anesthesia nausea and vomiting: no  Pulmonary: unassisted, nasal cannula  Cardiovascular: stable and blood pressure at baseline  Hydration: adequate  Anesthetic events: no    QCDR Measures:  ASA# 11 - Bryanna-op Cardiac Arrest: ASA11B - Patient did NOT experience unanticipated cardiac arrest  ASA# 12 - Bryanna-op Mortality Rate: ASA12B - Patient did NOT die  ASA# 13 - PACU Re-Intubation Rate: ASA13B - Patient did NOT require a new airway mgmt  ASA# 10 - Composite Anes Safety: ASA10A - No serious adverse event    Additional Notes:

## 2021-05-28 NOTE — TELEPHONE ENCOUNTER
Upcoming Appointment Question  When is the appointment: Today  What is your appointment for?: pre-op  Who is your appointment scheduled with?: PCP only  What is your question/concern?: Christina from [place] [facility] calling recommends patient have her hemoglobin checked today since having a history of a blood transfusion. If low the surgeon would need to be contacted.  Okay to leave a detailed message?: Yes

## 2021-05-28 NOTE — PROGRESS NOTES
Vitamin D is still mildly low; daily vitamin D OTC 2000 units, please call results to the patient or send a letter if not reachable by phone.

## 2021-05-29 ENCOUNTER — RECORDS - HEALTHEAST (OUTPATIENT)
Dept: ADMINISTRATIVE | Facility: CLINIC | Age: 71
End: 2021-05-29

## 2021-05-29 NOTE — TELEPHONE ENCOUNTER
FYI - Status Update  Who is Calling: Patient  Update: Calling to inform provider her insurance company will be faxing a document that provider will need to complete in regards to Prolia. Patient states the insurance company can be reached via phone at: [phone]  Reference# [id number].  Okay to leave a detailed message?:  No return call needed

## 2021-05-29 NOTE — ANESTHESIA POSTPROCEDURE EVALUATION
Patient: Yaneth Holguin  OPEN REDUCTION INTERNAL FIXATION, FRACTURE, RIGHT HUMERUS  Anesthesia type: general    Patient location: PACU  Last vitals:   Vitals Value Taken Time   /49 5/28/2019  2:09 PM   Temp 36.9  C (98.4  F) 5/28/2019  2:09 PM   Pulse 80 5/28/2019  2:09 PM   Resp 16 5/28/2019  2:09 PM   SpO2 96 % 5/28/2019  2:09 PM     Post vital signs: stable  Level of consciousness: awake and responds to simple questions  Post-anesthesia pain: pain controlled  Post-anesthesia nausea and vomiting: no  Pulmonary: return to baseline, nasal cannula  Cardiovascular: stable and blood pressure at baseline  Hydration: adequate  Anesthetic events: no    QCDR Measures:  ASA# 11 - Bryanna-op Cardiac Arrest: ASA11B - Patient did NOT experience unanticipated cardiac arrest  ASA# 12 - Bryanna-op Mortality Rate: ASA12B - Patient did NOT die  ASA# 13 - PACU Re-Intubation Rate: ASA13B - Patient did NOT require a new airway mgmt  ASA# 10 - Composite Anes Safety: ASA10A - No serious adverse event    Additional Notes:

## 2021-05-29 NOTE — ANESTHESIA PREPROCEDURE EVALUATION
Anesthesia Evaluation      Patient summary reviewed   No history of anesthetic complications     Airway   Mallampati: I  Neck ROM: full   Pulmonary    (+) COPD, shortness of breath, decreased breath sounds, a smoker (50 pk/yr hx, quit 1/19)                         Cardiovascular - normal exam  Exercise tolerance: < 4 METS  (+) hypertension, ,     (-) murmur  ECG reviewed  Rhythm: regular  Rate: normal,    no murmur      Neuro/Psych    (+) depression, anxiety/panic attacks,     Endo/Other    (+) arthritis,      GI/Hepatic/Renal    (+) GERD,   chronic renal disease,      Other findings: Results for MILES PAUL (MRN [id number]) as of 5/28/2019 06:33    5/28/2019 06:03  WBC: 8.5  RBC: 3.02 (L)  Hemoglobin: 8.1 (L)  Hematocrit: 25.9 (L)  MCV: 86  MCH: 26.8 (L)  MCHC: 31.3 (L)  RDW: 16.0 (H)  Platelets: 326  MPV: 10.3   Prolonged expiratory phase      Dental                           Anesthesia Plan  Planned anesthetic: general endotracheal and peripheral nerve block    ASA 3   Induction: intravenous   Anesthetic plan and risks discussed with: patient  Anesthesia plan special considerations: antiemetics,   Post-op plan: routine recovery

## 2021-05-29 NOTE — PROGRESS NOTES
HPI:  Yaneth Holguin is a 69 y.o. female who is seen for   Chief Complaint   Patient presents with     Follow-up     3 month    Yaneth Holguin is seen 3-month follow-up to osteoporosis, and hospital follow-up to humeral fracture of the right arm.  Approximately 5 weeks ago she fell and refractured her right humerus, had a pathologic fracture here due to osteoporosis, had had hardware removed from the arm due to poor healing and then reinjured and fractured again and had to have a new pins placed.  She is also lost strength in her right hand has contractures now because of these multiple surgeries and injuries.  She is currently getting OT and PT for her contractures of her right hand and she is now wearing a brace on her right wrist to help improve this contraction.  She states that she is having no concerns with the wound healing, no swelling, exudates, warmth.  She does note that there is a upper arm tendon that is more prominent now near the elbow.  Since her hospital stay she denies cough, congestion, fever, lower leg edema, claudication.  She states her mood is stable although she has had a little bit more depressed mood because of all these problems concerning her humeral fracture.  She still does gardening, sometimes gets up in the middle of the night to repot plants, she lives alone.  Review of systems as in HPI.  No results found for: HGBA1C  Lab Results   Component Value Date    LDLCALC 129 01/25/2018    CREATININE 0.78 05/29/2019     Patient Active Problem List   Diagnosis     Hypertension     Osteoporosis     Mixed hyperlipidemia     Herpes Simplex     Generalized anxiety disorder     Esophageal Reflux     Nephrolithiasis     Drug-seeking behavior     C2 cervical fracture (H)     Closed fracture of sternum, unspecified portion of sternum, initial encounter     ONEIDA (generalized anxiety disorder)     Mood disorder due to a general medical condition     Insomnia     Closed fracture of proximal end of  right humerus with delayed healing, unspecified fracture morphology, subsequent encounter     Hyperkalemia     Hyponatremia     CHAR (acute kidney injury) (H)     Smoker     Closed 4-part fracture of proximal humerus with delayed healing, right     Anemia associated with acute blood loss     Hypoxia     Mild major depression (H)     Family History   Problem Relation Age of Onset     COPD Mother      Lung cancer Brother      COPD Father          age 84     Hypertension Sister      Breast cancer Cousin      Social History     Socioeconomic History     Marital status:      Spouse name: None     Number of children: None     Years of education: None     Highest education level: None   Occupational History     None   Social Needs     Financial resource strain: None     Food insecurity:     Worry: None     Inability: None     Transportation needs:     Medical: None     Non-medical: None   Tobacco Use     Smoking status: Former Smoker     Packs/day: 0.50     Years: 45.00     Pack years: 22.50     Types: Cigarettes     Start date: 1970     Last attempt to quit: 2019     Years since quittin.4     Smokeless tobacco: Former User   Substance and Sexual Activity     Alcohol use: No     Drug use: No     Sexual activity: Not Currently   Lifestyle     Physical activity:     Days per week: None     Minutes per session: None     Stress: None   Relationships     Social connections:     Talks on phone: None     Gets together: None     Attends Faith service: None     Active member of club or organization: None     Attends meetings of clubs or organizations: None     Relationship status: None     Intimate partner violence:     Fear of current or ex partner: None     Emotionally abused: None     Physically abused: None     Forced sexual activity: None   Other Topics Concern     None   Social History Narrative    She is single and 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  She has 2 children and 4  grandchildren.     Past Surgical History:   Procedure Laterality Date     HARDWARE REMOVAL Right 5/3/2019    Procedure: REMOVAL, HARDWARE, RIGHT HUMERUS;  Surgeon: Kristopher Browning MD;  Location: [facility];  Service: Orthopedics     HYSTERECTOMY       JOINT REPLACEMENT       OOPHORECTOMY       ORIF HUMERUS FRACTURE Right 5/28/2019    Procedure: OPEN REDUCTION INTERNAL FIXATION, FRACTURE, RIGHT HUMERUS;  Surgeon: Kristopher Browning MD;  Location: [facility];  Service: Orthopedics     DC EVACUATE MOLE OF UTERUS      Description: Dilation And Curettage For Hydatidiform Mole;  Recorded: 08/06/2008;     DC REMOVE TONSILS/ADENOIDS,<13 Y/O      Description: Tonsillectomy With Adenoidectomy;  Recorded: 08/06/2008;     DC TOTAL ABDOM HYSTERECTOMY  1986    Description: Total Abdominal Hysterectomy;  Recorded: 08/30/2010;     DC TOTAL HIP ARTHROPLASTY Bilateral     Description: Total Hip Replacement;  Recorded: 09/08/2008;  Comments: for AVN; Lt. 5/07 and had to be redone because of Fx; Rt. done 8/08     right humeral fracture  01/2019     TONSILLECTOMY       VERTEBROPLASTY  12/2015    T4     WRIST FRACTURE SURGERY Left 5/2015     Current Outpatient Medications on File Prior to Visit   Medication Sig Dispense Refill     acetaminophen (TYLENOL) 500 MG tablet Take 2 tablets (1,000 mg total) by mouth 3 (three) times a day.  0     albuterol (VENTOLIN HFA) 90 mcg/actuation inhaler INHALE 1 TO 2 PUFFS BY MOUTH EVERY 4 TO 6 HOURS AS NEEDED 36 g 4     alendronate (FOSAMAX) 70 MG tablet Take 1 tablet (70 mg total) by mouth every 7 days. Thursdays, take in the AM on empty stomach with a full glass of water 30 mins before food 13 tablet 3     budesonide-formoterol (SYMBICORT) 160-4.5 mcg/actuation inhaler Inhale 2 puffs 2 (two) times a day. 1 Inhaler 12     docosanol (ABREVA) 10 % Crea Apply 1 application topically 5 x daily PRN (for cold sores).       ergocalciferol (VITAMIN D2) 50,000 unit capsule TAKE 1 CAPSULE BY  MOUTH ONCE A WEEK 12 capsule 5     metoprolol tartrate (LOPRESSOR) 100 MG tablet Take 1 tablet (100 mg total) by mouth 2 (two) times a day. 180 tablet 3     omeprazole (PRILOSEC) 20 MG capsule Take 1 capsule (20 mg total) by mouth 2 (two) times a day before meals. 180 capsule 3     ondansetron (ZOFRAN ODT) 4 MG disintegrating tablet Take 1 tablet (4 mg total) by mouth every 8 (eight) hours as needed for nausea. 30 tablet 3     oxyCODONE (ROXICODONE) 5 MG immediate release tablet Take 0.5-1 tablets (2.5-5 mg total) by mouth every 6 (six) hours as needed. 13 tablet 0     QUEtiapine (SEROQUEL) 100 MG tablet Take 2 tablets (200 mg total) by mouth at bedtime. 60 tablet 3     QUEtiapine (SEROQUEL) 25 MG tablet Take 25 mg by mouth 2 (two) times a day.       risperiDONE (RISPERDAL) 0.25 MG tablet Take 0.25 mg by mouth 2 (two) times a day.       senna-docusate (PERICOLACE) 8.6-50 mg tablet Take 1 tablet by mouth 2 (two) times a day.  0     venlafaxine (EFFEXOR-XR) 150 MG 24 hr capsule Take 2 capsules (300 mg total) by mouth daily. 180 capsule prn     gabapentin (NEURONTIN) 300 MG capsule Take 300 mg by mouth at bedtime.  0     No current facility-administered medications on file prior to visit.      Allergies   Allergen Reactions     Niacin Preparations Itching     Penicillins Itching     Pt states itching and welts as reaction.  Tolerated cefazolin 19     Nitrofurantoin Monohyd/M-Cryst Other (See Comments)     Reaction unknown     Adhesive Tape-Silicones Hives     OB History        2    Para   2    Term   2            AB        Living           SAB        TAB        Ectopic        Multiple        Live Births                   I have reviewed the patient's medical history in detail and updated the computerized patient record.  OBJECTIVE:  Wt Readings from Last 3 Encounters:   19 107 lb 14.4 oz (48.9 kg)   19 106 lb 12.8 oz (48.4 kg)   19 103 lb (46.7 kg)     Temp Readings from Last 3  Encounters:   05/30/19 98.5  F (36.9  C) (Oral)   05/04/19 98.1  F (36.7  C) (Oral)   04/25/19 98.3  F (36.8  C) (Oral)     BP Readings from Last 3 Encounters:   06/21/19 143/74   05/30/19 141/59   05/04/19 147/66     Pulse Readings from Last 3 Encounters:   06/21/19 74   05/30/19 75   05/04/19 79     Body mass index is 18.52 kg/m .     Alert, cooperative, well-hydrated. Appears well.  Eyes: Pupils equal, round, reactive to light.  HEENT: Sclera white, nares patent, MMM.  Neck: supple, without lymphadenopathy, Thyroid freely movable and without hypotrophy or nodularity.   Lungs: Clear to auscultation. No retractions, no increased work of respiration, equal chest rise.   Heart: Regular rate and rhythm, no murmurs, clicks,   Gallops.  Humerus: Humerus fracture area is well-healed on right, no erythema, edema, exudates, no increased warmth.  Right hand: Contracted mostly at wrist, wearing wrist brace today.  Extremities: no tenderness to palpation of gastrocnemius, bilaterally.  Skin: no increased warmth, edema, or erythema of lower legs bilaterally.  Back: Moderate to severe kyphosis of thoracic spine.  Neuro::pupils equal and reactive to light bilaterally, CN II - XII grossly intact.   Mood: Good eye contact, mildly pressured speech, no psychomotor agitation, no suicidal or homicidal ideations.  Labs:  Admission on 05/27/2019, Discharged on 05/30/2019   Component Date Value     VENTRICULAR RATE 05/27/2019 95      ATRIAL RATE 05/27/2019 95      P-R INTERVAL 05/27/2019 122      QRS DURATION 05/27/2019 86      Q-T INTERVAL 05/27/2019 356      QTC CALCULATION (BEZET) 05/27/2019 447      P Axis 05/27/2019 53      R AXIS 05/27/2019 58      T AXIS 05/27/2019 84      MUSE DIAGNOSIS 05/27/2019                      Value:Normal sinus rhythm  Nonspecific ST and T wave abnormality  Abnormal ECG  When compared with ECG of 03-JAN-2019 22:45,  Nonspecific T wave abnormality, worse in Anterolateral leads  Confirmed by LY FIGUEROA,  CLINTON LOC:JN ([id number]) on 5/28/2019 3:01:11 PM       ABORh 05/28/2019 O NEG      Antibody Screen 05/28/2019 Negative      Sodium 05/28/2019 143      Potassium 05/28/2019 3.5      Chloride 05/28/2019 118*     CO2 05/28/2019 18*     Anion Gap, Calculation 05/28/2019 7      Glucose 05/28/2019 92      Calcium 05/28/2019 8.1*     BUN 05/28/2019 17      Creatinine 05/28/2019 0.68      GFR MDRD Af Amer 05/28/2019 >60      GFR MDRD Non Af Amer 05/28/2019 >60      WBC 05/28/2019 8.5      RBC 05/28/2019 3.02*     Hemoglobin 05/28/2019 8.1*     Hematocrit 05/28/2019 25.9*     MCV 05/28/2019 86      MCH 05/28/2019 26.8*     MCHC 05/28/2019 31.3*     RDW 05/28/2019 16.0*     Platelets 05/28/2019 326      MPV 05/28/2019 10.3      Crossmatch 05/29/2019 Compatible      Blood Expiration Date 05/29/2019 [id number]      Unit Type 05/29/2019 O Neg      Unit Number 05/29/2019 [id number]      Status 05/29/2019 Transfused      Component 05/29/2019 Red Blood Cells      PRODUCT CODE 05/29/2019 [id number]      Issue Date and Time 05/29/2019 [id number]      Blood Type 05/29/2019 9500      CODING SYSTEM 05/29/2019 [id number]      Vitamin D, Total (25-Hyd* 05/28/2019 28.4*     Sodium 05/29/2019 138      Potassium 05/29/2019 3.0*     Chloride 05/29/2019 113*     CO2 05/29/2019 20*     Anion Gap, Calculation 05/29/2019 5      Glucose 05/29/2019 124      BUN 05/29/2019 13      Creatinine 05/29/2019 0.78      GFR MDRD Af Amer 05/29/2019 >60      GFR MDRD Non Af Amer 05/29/2019 >60      Bilirubin, Total 05/29/2019 0.3      Calcium 05/29/2019 7.9*     Protein, Total 05/29/2019 4.5*     Albumin 05/29/2019 2.5*     Alkaline Phosphatase 05/29/2019 48      AST 05/29/2019 12      ALT 05/29/2019 <9      WBC 05/29/2019 8.4      RBC 05/29/2019 2.68*     Hemoglobin 05/29/2019 7.4*     Hematocrit 05/29/2019 22.7*     MCV 05/29/2019 85      MCH 05/29/2019 27.6      MCHC 05/29/2019 32.6      RDW 05/29/2019 16.3*     Platelets 05/29/2019 286      MPV  05/29/2019 9.7      Potassium 05/30/2019 4.7      Hemoglobin 05/30/2019 8.2*   Admission on 05/03/2019, Discharged on 05/04/2019   Component Date Value     ABORh 05/03/2019 O NEG      Antibody Screen 05/03/2019 Negative      Hemoglobin 05/04/2019 10.6*   Physical on 04/25/2019   Component Date Value     WBC 04/25/2019 9.8      RBC 04/25/2019 4.58      Hemoglobin 04/25/2019 12.3      Hematocrit 04/25/2019 38.2      MCV 04/25/2019 83      MCH 04/25/2019 26.7*     MCHC 04/25/2019 32.1      RDW 04/25/2019 13.8      Platelets 04/25/2019 392      MPV 04/25/2019 8.3      Sodium 04/25/2019 140      Potassium 04/25/2019 4.7      Chloride 04/25/2019 113*     CO2 04/25/2019 17*     Anion Gap, Calculation 04/25/2019 10      Glucose 04/25/2019 77      Calcium 04/25/2019 8.9      BUN 04/25/2019 27*     Creatinine 04/25/2019 0.85      GFR MDRD Af Amer 04/25/2019 >60      GFR MDRD Non Af Amer 04/25/2019 >60      Sed Rate 04/25/2019 4      Vitamin D, Total (25-Hyd* 04/25/2019 20.4*     TSH 04/25/2019 1.46      Albumin 04/25/2019 3.1*     CRP 04/25/2019 0.3      Prealbumin 04/25/2019 25.7      ASSESMENT/PLAN:  1. Closed fracture of proximal end of right humerus with delayed healing, unspecified fracture morphology, subsequent encounter  Ambulatory referral to Endocrinology   2. Screen for colon cancer  Cologuard   3. Mild major depression (H)     4. Senile osteoporosis  Ambulatory referral to Endocrinology   Review of records shows that patient needs a new referral to endocrinology to get her Prolia injections.  Attempts to order Prolia met with needing prior Auth for insurance, but we will check into any additional paperwork needed for this.  Fracture site appears to be healing well, she states gabapentin is working well for the pain.  She is advised to use caution when up in her garage late at night potting plants since she does not live with anyone in her home.  Suggested that she keep a phone close at hand.  She voices  understanding.  Follow-up in 3 months for Medicare physical.  Paola Soria MS, PA-C 06/21/19

## 2021-05-29 NOTE — ANESTHESIA CARE TRANSFER NOTE
Last vitals:   Vitals:    05/28/19 1250   BP: 110/56   Pulse: 87   Resp: 16   Temp: 37.5  C (99.5  F)   SpO2: 99%     Patient's level of consciousness is awake and alert. Denies nausea. Denies pain.   Spontaneous respirations: yes  Maintains airway independently: yes  Dentition unchanged: yes  Oropharynx: oropharynx clear of all foreign objects    QCDR Measures:  ASA# 20 - Surgical Safety Checklist: WHO surgical safety checklist completed prior to induction    PQRS# 430 - Adult PONV Prevention: 4558F - Pt received => 2 anti-emetic agents (different classes) preop & intraop  ASA# 8 - Peds PONV Prevention: NA - Not pediatric patient, not GA or 2 or more risk factors NOT present  PQRS# 424 - Bryanna-op Temp Management: 4559F - At least one body temp DOCUMENTED => 35.5C or 95.9F within required timeframe  PQRS# 426 - PACU Transfer Protocol: - Transfer of care checklist used  ASA# 14 - Acute Post-op Pain: ASA14B - Patient did NOT experience pain >= 7 out of 10

## 2021-05-30 ENCOUNTER — RECORDS - HEALTHEAST (OUTPATIENT)
Dept: ADMINISTRATIVE | Facility: CLINIC | Age: 71
End: 2021-05-30

## 2021-05-30 VITALS — HEIGHT: 63 IN | WEIGHT: 114.6 LBS | BODY MASS INDEX: 20.3 KG/M2

## 2021-05-30 VITALS — HEIGHT: 63 IN | WEIGHT: 113.5 LBS | BODY MASS INDEX: 20.11 KG/M2

## 2021-05-30 NOTE — TELEPHONE ENCOUNTER
Patient informed of below. Expressed understanding.   Jessica Shipley, 04/03/19, 4:02 PM Patient was seen on 6/21/19    RN cannot approve Refill Request    RN can NOT refill this medication med is not covered by policy/route to provider. Last office visit: 6/21/2019 Paola Soria PA-C Last Physical: 4/25/2019 Last MTM visit: Visit date not found Last visit same specialty: 6/21/2019 Paola Soria PA-C.  Next visit within 3 mo: Visit date not found  Next physical within 3 mo: Visit date not found      Josselin Daigle, [facility] Connection Triage/Med Refill 7/13/2019    Requested Prescriptions   Pending Prescriptions Disp Refills     QUEtiapine (SEROQUEL) 100 MG tablet [Pharmacy Med Name: QUEtiapine Fumarate 100MG TAB] 60 tablet 3     Sig: TAKE 2 TABLETS BY MOUTH ONCE DAILY AT BEDTIME       There is no refill protocol information for this order

## 2021-05-30 NOTE — TELEPHONE ENCOUNTER
Who is calling:   Home Care Nurse Kriss     Reason for Call: Calling to state there is a level 2 drug interaction with QUEtiapine (SEROQUEL) and donepezil (ARICEPT) 5 MG tablet. And ondansetron (ZOFRAN ODT) 4 MG disintegrating tabletdonepezil (ARICEPT) 5 MG tablet. Aricept was prescribed in ED.   Please advise PCP of Medication interactions and call Home care to advise it is okay to continue using .Thank You     Date of last appointment with primary care:   07/10/19    Okay to leave a detailed message: Yes

## 2021-05-30 NOTE — TELEPHONE ENCOUNTER
Endocrine provider patient is scheduled with is on YULI at this time, first available appt matches with what patient currently has scheduled.  [facility] would not be any sooner, not significantly anyway.  Also to note, I have no received any calls or email from this patient as of this writing.  I will attempt to follow up with her in regards to this today or tomorrow (7/1-7/2).

## 2021-05-30 NOTE — TELEPHONE ENCOUNTER
As noted in my message from patient note dated 6/28, patient is currently scheduled for 1/7/20.  Endo provider here in w is on leave at this time and does not return until next week.  Soonest appointment available matches with what patient currently has scheduled.  [facility] would be later at this point as provider only sees patients there 1 day a week compared to 3 days here in [facility].

## 2021-05-30 NOTE — TELEPHONE ENCOUNTER
Patient Returning Call  Reason for call:  Patient returning call about Prolia injections.   Information relayed to patient:  Patient stated I have [employer] insurance and they only allow 3 clinics for endocrine appointments and the soonest appointment I found was 1/7/20. I have not heard from Alexi and I have made attempts via Email and phone.   Patient has additional questions:  Yes  If YES, what are your questions/concerns:  Please have Paola Soria PA-C  follow up with plan of care.    Okay to leave a detailed message?: Yes

## 2021-05-30 NOTE — PROGRESS NOTES
Hospital discharge follow up call to pt, no answer.  Left VM message asking pt to make INF appt with Paola Soria in the next week.  RN will attempt call back at another time.

## 2021-05-30 NOTE — PROGRESS NOTES
Assessment/Plan:   Wheezing  Chronic bronchitis with acute exacerbation (H)  Flare this week of chronic bronchitis and wheezing with very poor air movement on exam initially. Improved with duo-neb. She has not had neb machine at home but is interested in that. Rx printed for that and duo-neb solutions. Will add prednisone and levaquin given her past history of these episodes. Follow up as planned with pulmonology. We also gave her a spacer which should help her use the inhaler - currently impaired by signifciant airway constriction and limited use of her right hand from previous injury. She declined CXR which I concurred with given it would not likely change our current treatment plan. She knows she may need one if not improving.   I discussed red flag symptoms, return precautions to clinic/ER and follow up care with patient/parent.  Expected clinical course, symptomatic cares advised. Questions answered. Patient/parent amenable with plan.  - ipratropium-albuterol 0.5-2.5 mg/3 mL nebulizer solution 3 mL (DUO-NEB)  - ipratropium-albuterol (DUO-NEB) 0.5-2.5 mg/3 mL nebulizer; Take 3 mL by nebulization every 6 (six) hours as needed.  Dispense: 48 vial; Refill: 2  - Tubular Spacer  - nebulizer and compressor (VIOS AEROSOL DELIVERY SYSTEM) Mikki; Use as directed  Dispense: 1 each; Refill: 0  - levoFLOXacin (LEVAQUIN) 500 MG tablet; Take 1 tablet (500 mg total) by mouth daily for 7 days.  Dispense: 7 tablet; Refill: 0  - predniSONE (DELTASONE) 20 MG tablet; 40mg daily for 5 days then 20mg daily for 5 days.  Dispense: 15 tablet; Refill: 0  - nebulizer and compressor (VIOS AEROSOL DELIVERY SYSTEM) Mikki; Use as directed  Dispense: 1 each; Refill: 0    Levaquin daily for 7 days  Prednisone 40mg daily for 5 days then 20mg daily for 5 days  Take with food  Probiotics or yogurt if needed  Albuterol inhaler (ventolin) 2 puffs with spacer every 4 hours for wheezing if needed  Check with your insurance regarding coverage for  nebulizer machine but I have printed a prescription for nebulizer machine and neb solutions you can bring to pharmacy if you choose to get one  Follow up if worse or no better    Addendum: she was not able to get the nebulizer machine right away so called requesting the combivent inhaler instead and this was sent in - 4 times a day, may use ventolin in between if needed.     Subjective:      Yaneth Holguin is a 69 y.o. female who presents with a flare of her COPD. Last week she developed URI and cough which has become productive of green mucus. No fever. She has had wheezing and shortness of breath. She has had recurrent bronchitis which never gets better until she is treated with prednisone and levaquin. Her primary has tried other treatments which never work. She has been given ventolin inhaler for wheezing and cough and breathing troubles before but has never been diagnosed with COPD or asthma though she assumes she has it. She has family history of COPD and she feels just like they were. She has appointment 7/30/19 with pulmonology. This episode has no fever, no chest pain, leg swelling or calf tenderness. She is very short of breath and has not had much relief from the albuterol. She quit smoking in January this year. She denies GERD currently. She has some post nasal drainage, no sinus pain. Significant kyphosis and reports history of osteoporosis.     Allergies   Allergen Reactions     Niacin Preparations Itching     Penicillins Itching     Pt states itching and welts as reaction.  Tolerated cefazolin 1/4/19     Nitrofurantoin Monohyd/M-Cryst Other (See Comments)     Reaction unknown     Adhesive Tape-Silicones Hives     Current Outpatient Medications on File Prior to Visit   Medication Sig Dispense Refill     albuterol (VENTOLIN HFA) 90 mcg/actuation inhaler INHALE 1 TO 2 PUFFS BY MOUTH EVERY 4 TO 6 HOURS AS NEEDED 36 g 4     alendronate (FOSAMAX) 70 MG tablet Take 1 tablet (70 mg total) by mouth every 7  days. Thursdays, take in the AM on empty stomach with a full glass of water 30 mins before food 13 tablet 3     budesonide-formoterol (SYMBICORT) 160-4.5 mcg/actuation inhaler Inhale 2 puffs 2 (two) times a day. 1 Inhaler 12     docosanol (ABREVA) 10 % Crea Apply 1 application topically 5 x daily PRN (for cold sores).       ergocalciferol (VITAMIN D2) 50,000 unit capsule TAKE 1 CAPSULE BY MOUTH ONCE A WEEK 12 capsule 5     gabapentin (NEURONTIN) 300 MG capsule Take 300 mg by mouth at bedtime.  0     metoprolol tartrate (LOPRESSOR) 100 MG tablet Take 1 tablet (100 mg total) by mouth 2 (two) times a day. 180 tablet 3     omeprazole (PRILOSEC) 20 MG capsule Take 1 capsule (20 mg total) by mouth 2 (two) times a day before meals. 180 capsule 3     ondansetron (ZOFRAN ODT) 4 MG disintegrating tablet Take 1 tablet (4 mg total) by mouth every 8 (eight) hours as needed for nausea. 30 tablet 3     oxyCODONE (ROXICODONE) 5 MG immediate release tablet Take 0.5-1 tablets (2.5-5 mg total) by mouth every 6 (six) hours as needed. 13 tablet 0     QUEtiapine (SEROQUEL) 25 MG tablet Take 25 mg by mouth 2 (two) times a day.       risperiDONE (RISPERDAL) 0.25 MG tablet Take 0.25 mg by mouth 2 (two) times a day.       senna-docusate (PERICOLACE) 8.6-50 mg tablet Take 1 tablet by mouth 2 (two) times a day.  0     venlafaxine (EFFEXOR-XR) 150 MG 24 hr capsule Take 2 capsules (300 mg total) by mouth daily. 180 capsule prn     acetaminophen (TYLENOL) 500 MG tablet Take 2 tablets (1,000 mg total) by mouth 3 (three) times a day.  0     No current facility-administered medications on file prior to visit.      Patient Active Problem List   Diagnosis     Hypertension     Osteoporosis     Mixed hyperlipidemia     Herpes Simplex     Generalized anxiety disorder     Esophageal Reflux     Nephrolithiasis     Drug-seeking behavior     C2 cervical fracture (H)     Closed fracture of sternum, unspecified portion of sternum, initial encounter     ONEIDA  (generalized anxiety disorder)     Mood disorder due to a general medical condition     Insomnia     Closed fracture of proximal end of right humerus with delayed healing, unspecified fracture morphology, subsequent encounter     Hyperkalemia     Hyponatremia     CHAR (acute kidney injury) (H)     Smoker     Closed 4-part fracture of proximal humerus with delayed healing, right     Anemia associated with acute blood loss     Hypoxia     Mild major depression (H)       Objective:     /63 (Patient Site: Right Arm, Patient Position: Sitting, Cuff Size: Adult Regular)   Pulse 73   Temp 98  F (36.7  C) (Oral)   Resp 18   Wt 107 lb (48.5 kg)   LMP 10/29/1986   SpO2 96%   BMI 18.37 kg/m      Physical  General Appearance: Alert, cooperative, no distress but dyspneic  Head: Normocephalic, without obvious abnormality, atraumatic. Significant kyphosis  Eyes: Conjunctivae are normal.  Ears: Normal TMs and external ear canals, both ears  Nose:  Congestion, no sinus pain.  Throat: Throat is normal.  No exudate.  No significant lesions  Neck: No adenopathy  Lungs: poor air movement, prolonged exp phase and wheezes and rhonchi throughout. Duo-neb given in the office with improved dyspnea and air movement on auscultation.   Heart: Regular rate and rhythm  Extremities: No lower extremity edema. Right wrist in splint - makes using inhaler difficult.   Skin: Skin color, texture, turgor normal, no rashes or lesions  Psychiatric: Patient has a normal mood and affect.

## 2021-05-30 NOTE — TELEPHONE ENCOUNTER
Per request of patient, left detailed message with information and instructions as per Dr Lubin. Call back if further questions/concerns.

## 2021-05-30 NOTE — PATIENT INSTRUCTIONS - HE
Levaquin daily for 7 days  Prednisone 40mg daily for 5 days then 20mg daily for 5 days  Take with food  Probiotics or yogurt if needed  Albuterol inhaler (ventolin) 2 puffs with spacer every 4 hours for wheezing if needed  Check with your insurance regarding coverage for nebulizer machine but I have printed a prescription for nebulizer machine and neb solutions you can bring to pharmacy if you choose to get one  Follow up if worse or no better

## 2021-05-30 NOTE — TELEPHONE ENCOUNTER
I did not prescribe the Aricept. She needs to see neurology and I will place a consult. Hold this medication.

## 2021-05-30 NOTE — TELEPHONE ENCOUNTER
Okay, I sent in a combivent inhaler to use 4 times a day regularly. May still use the albuterol if needed in between doses.   Otherwise the prednisone and antibiotic should be helping.   Recheck if needed.

## 2021-05-30 NOTE — TELEPHONE ENCOUNTER
Last OV 06/21/19    QUEtiapine (SEROQUEL) 100 MG tablet 60 tablet 3 7/15/2019  No   Sig: TAKE 2 TABLETS BY MOUTH ONCE DAILY AT BEDTIME   Sent to pharmacy as: QUEtiapine (SEROQUEL) 100 MG tablet   Notes to Pharmacy: Please consider 90 day supplies to promote better adherence   E-Prescribing Status: Receipt confirmed by pharmacy (7/15/2019 10:29 AM CDT)         Was in ED on 07/20/19    Acute encephalopathy with hallucination: resolving  - Metabolic, etiology initially unclear, favor secondary to infection  -Patient was intermittently confused but after starting meropenem had continued improvement in cognition.  - Had agitation 7/22 and was started on 1:1 sitter  - More clear and quiet 7/23, discontinued 1 :1 sitter 7/23  -  neurology consulted  - Negative CT head for acute changes  - Unremarkable brain MRI for significant acute changes-  - Trial of Aricept as recommended by neurology, B12 supplementation and PO thiamine.  ----outpatient neuro follow up needed within 4 months after discharge.  - recommend neuropsych testing as outpatient        donepezil (ARICEPT) 5 MG tablet 30 tablet 0 7/25/2019  No   Sig - Route: Take 1 tablet (5 mg total) by mouth at bedtime. - Oral   Sent to pharmacy as: donepezil (ARICEPT) 5 MG tablet   E-Prescribing Status: Receipt confirmed by pharmacy (7/25/2019  9:45 AM CDT)

## 2021-05-30 NOTE — TELEPHONE ENCOUNTER
Medication Question or Clarification  Who is calling: Patient  What medication are you calling about? (include dose and sig)    Disp Refills Start End   ipratropium-albuterol (DUO-NEB) 0.5-2.5 mg/3 mL nebulizer 48 vial 2 7/10/2019    Sig - Route: Take 3 mL by nebulization every 6 (six) hours as needed. - Nebulization     Who prescribed the medication?: Alyse Lopez MD  What is your question/concern?: Patient has a 20% co-pay on her nebulizer and won't be able to pick it up until next week. Patient is asking if there is a similar medication in inhaler form she can  through the pharmacy. Patient is asking if a prescription can be sent to [facility].  Pharmacy: [facility] [place]  Okay to leave a detailed message?: Yes  Site CMT - Please call the pharmacy to obtain any additional needed information.    Order Providers   Prescribing Provider Encounter Provider   Alyse Lopez MD Smith McDermott, Kristina Marie, MD   Outpatient Medication Detail    Disp Refills Start End    ipratropium-albuterol (DUO-NEB) 0.5-2.5 mg/3 mL nebulizer 48 vial 2 7/10/2019     Sig - Route: Take 3 mL by nebulization every 6 (six) hours as needed. - Nebulization    Class: Print    Associated Diagnoses   Wheezing  - Primary       Pharmacy   [id number] - [place], [place] - [address] [place] [address] [address]

## 2021-05-31 ENCOUNTER — RECORDS - HEALTHEAST (OUTPATIENT)
Dept: ADMINISTRATIVE | Facility: CLINIC | Age: 71
End: 2021-05-31

## 2021-05-31 VITALS — BODY MASS INDEX: 18.64 KG/M2 | WEIGHT: 112 LBS

## 2021-05-31 VITALS — BODY MASS INDEX: 18.85 KG/M2 | WEIGHT: 110.4 LBS | HEIGHT: 64 IN

## 2021-05-31 VITALS — BODY MASS INDEX: 19.4 KG/M2 | WEIGHT: 113 LBS

## 2021-05-31 VITALS — BODY MASS INDEX: 18.61 KG/M2 | WEIGHT: 109 LBS | HEIGHT: 64 IN

## 2021-05-31 VITALS — HEIGHT: 64 IN | WEIGHT: 109.2 LBS | BODY MASS INDEX: 18.64 KG/M2

## 2021-05-31 VITALS — HEIGHT: 65 IN | WEIGHT: 113 LBS | BODY MASS INDEX: 18.83 KG/M2

## 2021-05-31 VITALS — WEIGHT: 112 LBS | HEIGHT: 64 IN | BODY MASS INDEX: 19.12 KG/M2

## 2021-05-31 VITALS — BODY MASS INDEX: 18.83 KG/M2 | WEIGHT: 109.7 LBS

## 2021-05-31 VITALS — HEIGHT: 64 IN | BODY MASS INDEX: 17.93 KG/M2 | WEIGHT: 105 LBS

## 2021-05-31 VITALS — WEIGHT: 96.7 LBS | BODY MASS INDEX: 16.6 KG/M2

## 2021-05-31 VITALS — BODY MASS INDEX: 19.29 KG/M2 | WEIGHT: 113 LBS | HEIGHT: 64 IN

## 2021-05-31 VITALS — WEIGHT: 112 LBS | BODY MASS INDEX: 19.22 KG/M2

## 2021-05-31 VITALS — BODY MASS INDEX: 19.48 KG/M2 | WEIGHT: 113.5 LBS

## 2021-05-31 NOTE — PROGRESS NOTES
TCM DISCHARGE FOLLOW UP CALL    Discharge Date:  7/25/2019  Reason for hospital stay (discharge diagnosis)::  UTI, encephalopathy  Are you feeling better, the same or worse since your discharge?:  Patient is feeling better (Voiding well. Denies dysuria, hematuria, low pack or abd pain. Menatation improved. No hallucinations.)  Do you feel like you have a plan in the event of a health emergency?: Yes (Two friends)    As part of your discharge plan, were  home care services ordered for you?: Yes    Have you seen them yet, or are they scheduled to visit?: Yes    Do you have any follow up visits scheduled with your PCP or Specialist?:  Yes, with PCP and Yes, with Specialist  (RN) Is PCP appt scheduled soon enough (within 14 days of discharge date)?: Yes (8/2)    Who are you seeing and when is it scheduled?:  Pulm 9/16

## 2021-05-31 NOTE — PROGRESS NOTES
HPI:  Yaneth Holguin is a 69 y.o. female who is seen for   Chief Complaint   Patient presents with     Hospital Visit Follow Up   Yaneth Holguin is seen in follow-up to hospital visit, had a E. coli infection that was resistant, sepsis, hallucinations.  Urine culture was found to have a ESBL E. coli.  She was on Rocephin and then switched to meropenem.  Her cognition improved with the change in medication.  She was consulted with infectious disease while in the hospital.  Since hospital admission she was placed on fosfomycin, 2 doses over a 48-hour time..  She has completed this antibiotic.  She had acute renal injury, secondary to the dehydration.  She has not been taking enough fluid daily.  This was improved on IV fluids, she was warned to avoid nephrotoxic drugs such as NSAIDs and has continued off of these medications.  Neuropsych testing was advised at discharge, she does not have an appointment for this yet.  She had an exacerbation of her COPD while in the hospital, this has also improved since discharge.  She continues on her regular inhalers.  She had an episode of hypotension while in the hospital, improved on IV fluids, close follow-up of her blood pressure was recommended.  She had an episode of hypokalemia while in the hospital, magnesium level was negative, she finished all her potassium supplements.  It was advised to recheck this level.  She was given Aricept at discharge, her pharmacist called for concerns of interaction with her Seroquel so this medication was not started.  ROS: Negative for chest pain, palpitations, worsening shortness of breath, wheezing, claudication, lower leg edema, fever, rash, confusion, hallucinations, worsening anxiety or depression.    No results found for: HGBA1C  Lab Results   Component Value Date    LDLCALC 129 01/25/2018    CREATININE 0.83 08/02/2019     Patient Active Problem List   Diagnosis     Hypertension     Osteoporosis     Mixed hyperlipidemia     Herpes  Simplex     Generalized anxiety disorder     Esophageal Reflux     Nephrolithiasis     Drug-seeking behavior     C2 cervical fracture (H)     Closed fracture of sternum, unspecified portion of sternum, initial encounter     ONEIDA (generalized anxiety disorder)     Mood disorder due to a general medical condition     Insomnia     Closed fracture of proximal end of right humerus with delayed healing, unspecified fracture morphology, subsequent encounter     Hyperkalemia     Hyponatremia     CHAR (acute kidney injury) (H)     Smoker     Closed 4-part fracture of proximal humerus with delayed healing, right     Anemia associated with acute blood loss     Hypoxia     Mild major depression (H)     Urinary tract infection     Metabolic encephalopathy     Cognitive and behavioral changes     Major depressive disorder, recurrent episode, moderate (H)     Hallucinations     Drug overdose     Family History   Problem Relation Age of Onset     COPD Mother      Lung cancer Brother      COPD Father          age 84     Hypertension Sister      Breast cancer Cousin      Social History     Socioeconomic History     Marital status:      Spouse name: None     Number of children: None     Years of education: None     Highest education level: None   Occupational History     None   Social Needs     Financial resource strain: None     Food insecurity:     Worry: None     Inability: None     Transportation needs:     Medical: None     Non-medical: None   Tobacco Use     Smoking status: Former Smoker     Packs/day: 0.50     Years: 45.00     Pack years: 22.50     Types: Cigarettes     Start date: 1970     Last attempt to quit: 2019     Years since quittin.5     Smokeless tobacco: Former User   Substance and Sexual Activity     Alcohol use: No     Drug use: No     Sexual activity: Not Currently   Lifestyle     Physical activity:     Days per week: None     Minutes per session: None     Stress: None   Relationships      Social connections:     Talks on phone: None     Gets together: None     Attends Caodaism service: None     Active member of club or organization: None     Attends meetings of clubs or organizations: None     Relationship status: None     Intimate partner violence:     Fear of current or ex partner: None     Emotionally abused: None     Physically abused: None     Forced sexual activity: None   Other Topics Concern     None   Social History Narrative    She is single and is retired (2017) from [employer] in [facility].  She has 2 children and 4 grandchildren.     Past Surgical History:   Procedure Laterality Date     HARDWARE REMOVAL Right 5/3/2019    Procedure: REMOVAL, HARDWARE, RIGHT HUMERUS;  Surgeon: Kristopher Browning MD;  Location: [facility];  Service: Orthopedics     HYSTERECTOMY       JOINT REPLACEMENT       OOPHORECTOMY       ORIF HUMERUS FRACTURE Right 5/28/2019    Procedure: OPEN REDUCTION INTERNAL FIXATION, FRACTURE, RIGHT HUMERUS;  Surgeon: Kristopher Browning MD;  Location: [facility];  Service: Orthopedics     TX EVACUATE MOLE OF UTERUS      Description: Dilation And Curettage For Hydatidiform Mole;  Recorded: 08/06/2008;     TX REMOVE TONSILS/ADENOIDS,<11 Y/O      Description: Tonsillectomy With Adenoidectomy;  Recorded: 08/06/2008;     TX TOTAL ABDOM HYSTERECTOMY  1986    Description: Total Abdominal Hysterectomy;  Recorded: 08/30/2010;     TX TOTAL HIP ARTHROPLASTY Bilateral     Description: Total Hip Replacement;  Recorded: 09/08/2008;  Comments: for AVN; Lt. 5/07 and had to be redone because of Fx; Rt. done 8/08     right humeral fracture  01/2019     TONSILLECTOMY       VERTEBROPLASTY  12/2015    T4     WRIST FRACTURE SURGERY Left 5/2015     Current Outpatient Medications on File Prior to Visit   Medication Sig Dispense Refill     acetaminophen (TYLENOL) 500 MG tablet Take 1-2 tablets (500-1,000 mg total) by mouth every 4 (four) hours as needed.  0     albuterol  (VENTOLIN HFA) 90 mcg/actuation inhaler INHALE 1 TO 2 PUFFS BY MOUTH EVERY 4 TO 6 HOURS AS NEEDED 36 g 4     alendronate (FOSAMAX) 70 MG tablet Take 1 tablet (70 mg total) by mouth every 7 days. Thursdays, take in the AM on empty stomach with a full glass of water 30 mins before food 13 tablet 3     budesonide-formoterol (SYMBICORT) 160-4.5 mcg/actuation inhaler Inhale 2 puffs 2 (two) times a day. 1 Inhaler 12     calcium, as carbonate, (OS-MINDA) 500 mg calcium (1,250 mg) tablet Take 1 tablet by mouth daily.       donepezil (ARICEPT) 5 MG tablet Take 1 tablet (5 mg total) by mouth at bedtime. 30 tablet 0     ergocalciferol (VITAMIN D2) 50,000 unit capsule TAKE 1 CAPSULE BY MOUTH ONCE A WEEK 12 capsule 5     gabapentin (NEURONTIN) 300 MG capsule Take 600 mg by mouth 3 (three) times a day as needed.         0     ipratropium-albuterol (COMBIVENT RESPIMAT)  mcg/actuation Mist inhaler Inhale 1 puff 4 (four) times a day. 1 Inhaler 0     magnesium hydroxide (MAGNESIUM HYDROXIDE) 400 mg/5 mL Susp suspension Take 30 mL by mouth daily as needed.       mecobalamin, vitamin B12, 1,000 mcg TbDL Place 1 tablet (1,000 mcg total) under the tongue daily.  0     metoprolol tartrate (LOPRESSOR) 100 MG tablet Take 1 tablet (100 mg total) by mouth 2 (two) times a day. 180 tablet 3     omeprazole (PRILOSEC) 20 MG capsule Take 1 capsule (20 mg total) by mouth 2 (two) times a day before meals. 180 capsule 3     ondansetron (ZOFRAN ODT) 4 MG disintegrating tablet Take 1 tablet (4 mg total) by mouth every 8 (eight) hours as needed for nausea. 30 tablet 3     polyethylene glycol (MIRALAX) 17 gram packet Take 1 packet (17 g total) by mouth daily.  0     QUEtiapine (SEROQUEL) 100 MG tablet TAKE 2 TABLETS BY MOUTH ONCE DAILY AT BEDTIME 60 tablet 3     risperiDONE (RISPERDAL) 1 MG tablet Take 1 mg by mouth 3 (three) times a day.       thiamine 50 MG tablet Take 1 tablet (50 mg total) by mouth daily. 7 tablet 0     venlafaxine (EFFEXOR-XR)  150 MG 24 hr capsule Take 2 capsules (300 mg total) by mouth daily. 180 capsule prn     No current facility-administered medications on file prior to visit.      Allergies   Allergen Reactions     Niacin Preparations Itching     Penicillins Itching     Pt states itching and welts as reaction.  Tolerated cefazolin 19     Nitrofurantoin Monohyd/M-Cryst Other (See Comments)     Reaction unknown     Adhesive Tape-Silicones Hives     OB History        2    Para   2    Term   2            AB        Living           SAB        TAB        Ectopic        Multiple        Live Births                   I have reviewed the patient's medical history in detail and updated the computerized patient record.  OBJECTIVE:  Wt Readings from Last 3 Encounters:   19 105 lb (47.6 kg)   19 105 lb 1.6 oz (47.7 kg)   19 102 lb 4.8 oz (46.4 kg)     Temp Readings from Last 3 Encounters:   19 98  F (36.7  C) (Oral)   19 97.5  F (36.4  C) (Oral)   07/10/19 98  F (36.7  C) (Oral)     BP Readings from Last 3 Encounters:   19 120/55   19 118/47   19 125/63     Pulse Readings from Last 3 Encounters:   19 95   19 76   19 89     Body mass index is 18.04 kg/m .     Alert, cooperative, well-hydrated. Appears well.  Eyes: Pupils equal, round, reactive to light.  HEENT: Sclera white, nares patent, MMM, TM's pearly bilaterally  Neck: supple, without lymphadenopathy, Thyroid freely movable and without hypotrophy or nodularity.   Lungs: Inspiration decreased as compared expiration, no retractions, no increased work of respiration, equal chest rise, light crackles at bases bilaterally.   Heart: Regular rate and rhythm, no murmurs, clicks,   Gallops.  Neuro::pupils equal and reactive to light bilaterally, CN II - XII grossly intact. No focal motor/sensory deficits.   Mood: Affect flat, no pressured speech, no psychomotor agitation, no suicidal or homicidal ideation.  Mental  abilities at baseline.  Labs:  Admission on 07/20/2019, Discharged on 07/25/2019   No results displayed because visit has over 200 results.      Admission on 05/27/2019, Discharged on 05/30/2019   Component Date Value     VENTRICULAR RATE 05/27/2019 95      ATRIAL RATE 05/27/2019 95      P-R INTERVAL 05/27/2019 122      QRS DURATION 05/27/2019 86      Q-T INTERVAL 05/27/2019 356      QTC CALCULATION (BEZET) 05/27/2019 447      P Axis 05/27/2019 53      R AXIS 05/27/2019 58      T AXIS 05/27/2019 84      MUSE DIAGNOSIS 05/27/2019                      Value:Normal sinus rhythm  Nonspecific ST and T wave abnormality  Abnormal ECG  When compared with ECG of 03-JAN-2019 22:45,  Nonspecific T wave abnormality, worse in Anterolateral leads  Confirmed by CLINTON MODI MD LOC:JN ([id number]) on 5/28/2019 3:01:11 PM       ABORh 05/28/2019 O NEG      Antibody Screen 05/28/2019 Negative      Sodium 05/28/2019 143      Potassium 05/28/2019 3.5      Chloride 05/28/2019 118*     CO2 05/28/2019 18*     Anion Gap, Calculation 05/28/2019 7      Glucose 05/28/2019 92      Calcium 05/28/2019 8.1*     BUN 05/28/2019 17      Creatinine 05/28/2019 0.68      GFR MDRD Af Amer 05/28/2019 >60      GFR MDRD Non Af Amer 05/28/2019 >60      WBC 05/28/2019 8.5      RBC 05/28/2019 3.02*     Hemoglobin 05/28/2019 8.1*     Hematocrit 05/28/2019 25.9*     MCV 05/28/2019 86      MCH 05/28/2019 26.8*     MCHC 05/28/2019 31.3*     RDW 05/28/2019 16.0*     Platelets 05/28/2019 326      MPV 05/28/2019 10.3      Crossmatch 05/29/2019 Compatible      Blood Expiration Date 05/29/2019 [id number]      Unit Type 05/29/2019 O Neg      Unit Number 05/29/2019 [id number]      Status 05/29/2019 Transfused      Component 05/29/2019 Red Blood Cells      PRODUCT CODE 05/29/2019 [id number]      Issue Date and Time 05/29/2019 [id number]      Blood Type 05/29/2019 9500      CODING SYSTEM 05/29/2019 [id number]      Vitamin D, Total (25-Hyd* 05/28/2019 28.4*     Sodium  05/29/2019 138      Potassium 05/29/2019 3.0*     Chloride 05/29/2019 113*     CO2 05/29/2019 20*     Anion Gap, Calculation 05/29/2019 5      Glucose 05/29/2019 124      BUN 05/29/2019 13      Creatinine 05/29/2019 0.78      GFR MDRD Af Amer 05/29/2019 >60      GFR MDRD Non Af Amer 05/29/2019 >60      Bilirubin, Total 05/29/2019 0.3      Calcium 05/29/2019 7.9*     Protein, Total 05/29/2019 4.5*     Albumin 05/29/2019 2.5*     Alkaline Phosphatase 05/29/2019 48      AST 05/29/2019 12      ALT 05/29/2019 <9      WBC 05/29/2019 8.4      RBC 05/29/2019 2.68*     Hemoglobin 05/29/2019 7.4*     Hematocrit 05/29/2019 22.7*     MCV 05/29/2019 85      MCH 05/29/2019 27.6      MCHC 05/29/2019 32.6      RDW 05/29/2019 16.3*     Platelets 05/29/2019 286      MPV 05/29/2019 9.7      Potassium 05/30/2019 4.7      Hemoglobin 05/30/2019 8.2*   Admission on 05/03/2019, Discharged on 05/04/2019   Component Date Value     ABORh 05/03/2019 O NEG      Antibody Screen 05/03/2019 Negative      Hemoglobin 05/04/2019 10.6*     ASSESMENT/PLAN:  1. Sepsis due to Escherichia coli (E. coli) (H)  Ambulatory referral to Infectious Disease    Urinalysis-UC if Indicated    Urinalysis-UC if Indicated   2. Cognitive decline  Ambulatory referral to Neurology   3. Low potassium syndrome  Basic Metabolic Panel    Basic Metabolic Panel   4. Hyponatremia  Basic Metabolic Panel    Basic Metabolic Panel   5. Screen for colon cancer  Cologuard   6. COPD exacerbation (H)     #1 patient appears to have improved from all infection signs, will recheck UA to see that she has clear infection.  She is advised to continue to drink plenty of water, infectious disease referral was placed in the computer for her today.  Advised that she does need to have a outpatient visit with them concerning this resistant E. Coli.  #2 the patient appears to be at her baseline today for cognition, will get neurology consult for additional testing.  Also recommendation for  medication to help with memory loss.  She will continue on her Risperdal and Seroquel for anxiety and depression symptoms and her schizophrenia.  #3 we will recheck BMP to see if she needs any additional potassium, advised her that we will refill her potassium if needed.  #4 hyponatremia most likely related to her acute renal injury, will recheck with the same BMP and advised her to balance fluids and add a little salt daily to her diet.  #5 COPD, continue on current inhalers, no changes, follow-up with pulmonology as scheduled.  #6 screening for colon cancer, health maintenance.  Patient already has a follow-up appointment in September, will keep this appointment for recheck on September 19.  Paola Soria, MS, PA-C 08/06/19

## 2021-05-31 NOTE — TELEPHONE ENCOUNTER
Critical lab result    Potassium 6.3  Drawn at 1223  Ordered by SARTHAK Schilling    Paged on-call provider Morris Hernandez MD 10:08pm.    Per Dr. Hernandez - patient should go to ED now to be evaluated.      Contacted patient and relayed recommendations from Dr. Hernandez. Patient agrees and says she will find a ride to the ED now.    Josselin Daigle RN  Triage Nurse Advisor

## 2021-06-01 ENCOUNTER — RECORDS - HEALTHEAST (OUTPATIENT)
Dept: ADMINISTRATIVE | Facility: CLINIC | Age: 71
End: 2021-06-01

## 2021-06-01 VITALS — BODY MASS INDEX: 19.22 KG/M2 | WEIGHT: 112.6 LBS | HEIGHT: 64 IN

## 2021-06-01 NOTE — PROGRESS NOTES
Situation:  Patient was referral to [facility] by Vira Hollis [facility] RN, on August 30, 2019 due to Several hospital and ed visits. Medication education, community resources, any other possible needs? Transportation? Etc    Background:   Patient has Medicare and her secondary health insurance is [employer].      Assessment:  After review patient health insurance, she's not qualify for medical transportation due to she doesn't have [place] as her secondary health insurance. [employer] is an option if she choose to apply and if patient agree to enroll to [facility], CHW will schedule patient with [facility] SW.       Scheduled Follow Up Call: Attempt 1   Community Health Worker called and left a message for the patient. If the patient is returning my call, please transfer the patient to [facility] at ext. [phone].  Recommendation:   CHW do attempt 2 follow up outreach to patient.    CCC follow up attempt 2 outreach:  September 10, 2019

## 2021-06-01 NOTE — PROGRESS NOTES
Situation:  Patient was referral to [facility] by Vira Hollis [facility] RN, on August 30, 2019 due to Several hospital and ed visits. Medication education, community resources, any other possible needs? Transportation? Etc     Background:   Patient has Medicare and her secondary health insurance is [employer].       Assessment:  [facility] outreach attempt 2  The clinic Community Health Worker call the patient today at the request of the Vira Hollis CCC RN to discuss possible Clinic Care Coordination enrollment.  The service was described to the patient and immediate needs were discussed.  The patient declined enrollement at this time.  The PCP is encouraged to refer in the future if the patient's needs change.    Recommendation:   CHW remove patient name from Order WQs and compete referral.

## 2021-06-01 NOTE — PROGRESS NOTES
Provider wore a mask during this visit.   Subjective:   Yaneth Holguin is a 69 y.o. female  Roomed by: david MOORE       Chief Complaint   Patient presents with     Shortness of Breath     SOB feeling, nausea and some coughing x 2 days    Says gradually started having more shortness of breath 2 days ago. Says chest is sore from working at breathing, and admits coughing more. Using her inhalers more often. Admits sweats, but not chills. Appetite is fair. Admits being able to drink and urinate normally. Admits nausea, but denies vomiting or diarrhea. Admits energy level has been down. Denies nasal congestion, but usually has a runny nose. Lives alone. Not exposed to cigarette smoke; quit smoking past January. Never got her nebulizer machine.  Says she is to see a pulmonologist in November.  Says in the past she received prednisone and antibiotics which worked for her.  Review of Systems  See HPI for ROS, otherwise balance of other systems negative    Allergies   Allergen Reactions     Niacin Preparations Itching     Penicillins Itching     Pt states itching and welts as reaction.  Tolerated cefazolin 1/4/19     Nitrofurantoin Monohyd/M-Cryst Other (See Comments)     Reaction unknown     Adhesive Tape-Silicones Hives       Current Outpatient Medications:      acetaminophen (TYLENOL) 500 MG tablet, Take 1-2 tablets (500-1,000 mg total) by mouth every 4 (four) hours as needed., Disp: , Rfl: 0     albuterol (VENTOLIN HFA) 90 mcg/actuation inhaler, INHALE 1 TO 2 PUFFS BY MOUTH EVERY 4 TO 6 HOURS AS NEEDED, Disp: 36 g, Rfl: 4     alendronate (FOSAMAX) 70 MG tablet, Take 1 tablet (70 mg total) by mouth every 7 days. Thursdays, take in the AM on empty stomach with a full glass of water 30 mins before food, Disp: 13 tablet, Rfl: 3     budesonide-formoterol (SYMBICORT) 160-4.5 mcg/actuation inhaler, Inhale 2 puffs 2 (two) times a day., Disp: 1 Inhaler, Rfl: 12     calcium, as carbonate, (OS-MINDA) 500 mg calcium (1,250 mg)  tablet, Take 1 tablet by mouth daily., Disp: , Rfl:      donepezil (ARICEPT) 5 MG tablet, Take 1 tablet (5 mg total) by mouth at bedtime., Disp: 30 tablet, Rfl: 0     ergocalciferol (VITAMIN D2) 50,000 unit capsule, TAKE 1 CAPSULE BY MOUTH ONCE A WEEK, Disp: 12 capsule, Rfl: 5     gabapentin (NEURONTIN) 300 MG capsule, Take 600 mg by mouth 3 (three) times a day.    , Disp: , Rfl: 0     ipratropium-albuterol (COMBIVENT RESPIMAT)  mcg/actuation Mist inhaler, Inhale 1 puff 4 (four) times a day., Disp: 1 Inhaler, Rfl: 12     magnesium hydroxide (MAGNESIUM HYDROXIDE) 400 mg/5 mL Susp suspension, Take 30 mL by mouth daily as needed., Disp: , Rfl:      mecobalamin, vitamin B12, 1,000 mcg TbDL, Place 1 tablet (1,000 mcg total) under the tongue daily., Disp: , Rfl: 0     metoprolol tartrate (LOPRESSOR) 100 MG tablet, Take 1 tablet (100 mg total) by mouth 2 (two) times a day., Disp: 180 tablet, Rfl: 3     omeprazole (PRILOSEC) 20 MG capsule, Take 1 capsule (20 mg total) by mouth 2 (two) times a day before meals., Disp: 180 capsule, Rfl: 3     ondansetron (ZOFRAN ODT) 4 MG disintegrating tablet, Take 1 tablet (4 mg total) by mouth every 8 (eight) hours as needed for nausea., Disp: 30 tablet, Rfl: 3     polyethylene glycol (MIRALAX) 17 gram packet, Take 1 packet (17 g total) by mouth daily., Disp: , Rfl: 0     QUEtiapine (SEROQUEL) 100 MG tablet, TAKE 2 TABLETS BY MOUTH ONCE DAILY AT BEDTIME, Disp: 60 tablet, Rfl: 3     risperiDONE (RISPERDAL) 1 MG tablet, Take 1 mg by mouth 3 (three) times a day., Disp: , Rfl:      thiamine 50 MG tablet, Take 1 tablet (50 mg total) by mouth daily., Disp: 7 tablet, Rfl: 0     venlafaxine (EFFEXOR-XR) 150 MG 24 hr capsule, Take 2 capsules (300 mg total) by mouth daily., Disp: 180 capsule, Rfl: prn  Patient Active Problem List   Diagnosis     Hypertension     Osteoporosis     Mixed hyperlipidemia     Herpes Simplex     Generalized anxiety disorder     Esophageal Reflux     Nephrolithiasis      Drug-seeking behavior     C2 cervical fracture (H)     Closed fracture of sternum, unspecified portion of sternum, initial encounter     ONEIDA (generalized anxiety disorder)     Mood disorder due to a general medical condition     Insomnia     Closed fracture of proximal end of right humerus with delayed healing, unspecified fracture morphology, subsequent encounter     Hyperkalemia     Hyponatremia     CHAR (acute kidney injury) (H)     Smoker     Closed 4-part fracture of proximal humerus with delayed healing, right     Anemia associated with acute blood loss     Hypoxia     Mild major depression (H)     Urinary tract infection     Metabolic encephalopathy     Cognitive deficits     Major depressive disorder, recurrent episode, moderate (H)     Hallucinations     Drug overdose     COPD exacerbation (H)     Elevated troponin     Past Medical History:   Diagnosis Date     Anemia      Anxiety      Avascular necrosis (H)      COPD (chronic obstructive pulmonary disease) (H)      Distal radius fracture 05/30/2015    Left Distal (ace wrapped on admission). Fell when climbing stairs     Endometriosis      Fall 05/30/2015     Gastritis      GERD (gastroesophageal reflux disease)      Herpes simplex      History of kidney stones      History of transfusion      Hyperlipidemia      Hypertension      Insomnia      Left elbow fracture 02/2015     Osteoporosis      Shortness of breath     with exertion         Objective:     Vitals:    09/21/19 0810   BP: 144/68   Pulse: 88   Resp: 20   Temp: 98.3  F (36.8  C)   TempSrc: Oral   SpO2: 95%   Weight: 99 lb 6.4 oz (45.1 kg)   Gen - Pt looks somewhat winded and anxious but not in acute respiratory distress  Eyes - Conjunctiva non injected, no drainage  Face - non TTP over frontal sinus areas; non TTP over maxillary areas  Ears - external canals - no induration, Right TM - non injected, Left TM - non injected   Nose - not congested, no nasal drainage  Pharynx - not injected,  tonsils 1+ size  Neck - supple, no cervical adenopathy, no masses  Cor - RRR w/o murmur  Lungs - Fair air entry; tight expiratory wheezes, but no crackles noted on auscultation - occasional coughing noted  Skin - no lesions, no rashes noted     Assessment - Plan   Medical Decision Making -69-year-old woman with a history of COPD presents with recent increase in her shortness of breath and coughing over the last 2 days.  Also admits some sweats and runny nose and increased fatigue.  On exam her first blood pressure was 144/68 which was not repeated her O2 sat was reassuring at 95% balance of vital signs are stable.  Patient did look somewhat anxious but not in acute respiratory distress.  She did have fair air entry and some tight expiratory wheezes with occasional coughing.  Her lung exam was slightly improved after her DuoNeb.  We will treat this as a COPD exacerbation with a 12-day taper of prednisone and Levaquin over 7 days.  Patient has a follow-up appointment with primary care on 9/24.  Discussed with patient when to contact the care line versus when to call 911 for worsening symptoms.  See patient instructions.    1. COPD exacerbation (H)  - ipratropium-albuterol 0.5-2.5 mg/3 mL nebulizer solution 3 mL (DUO-NEB)  - levoFLOXacin (LEVAQUIN) 750 MG tablet; Take 1 tablet (750 mg total) by mouth daily for 7 days.  Dispense: 7 tablet; Refill: 0  - predniSONE (DELTASONE) 20 MG tablet; Take 40 mg by mouth daily for 5 days, THEN 20 mg daily for 5 days, THEN 10 mg daily for 4 days.  Dispense: 17 tablet; Refill: 0  - Nursing communication    2. Chronic bronchitis, unspecified chronic bronchitis type (H)    3. Former cigarette smoker    At the conclusion of the encounter, assessment and plan were discussed.   All questions were answered.   The patient or guardian acknowledged understanding and was involved in the decision making regarding the overall care plan.    There are no Patient Instructions on file for this  visit.

## 2021-06-01 NOTE — TELEPHONE ENCOUNTER
Last office visit: 8/2/19    #2 the patient appears to be at her baseline today for cognition, will get neurology consult for additional testing.  Also recommendation for medication to help with memory loss.  She will continue on her Risperdal and Seroquel for anxiety and depression symptoms and her schizophrenia.    Last refill: Historical medication.    risperiDONE (RISPERDAL) 1 MG tablet     --   Sig - Route: Take 1 mg by mouth 3 (three) times a day. - Oral   Class: Historical Med

## 2021-06-02 ENCOUNTER — RECORDS - HEALTHEAST (OUTPATIENT)
Dept: ADMINISTRATIVE | Facility: CLINIC | Age: 71
End: 2021-06-02

## 2021-06-02 VITALS — BODY MASS INDEX: 19.84 KG/M2 | WEIGHT: 115.6 LBS

## 2021-06-02 VITALS — WEIGHT: 100.7 LBS | BODY MASS INDEX: 17.29 KG/M2

## 2021-06-02 VITALS — HEIGHT: 64 IN | BODY MASS INDEX: 20.57 KG/M2 | WEIGHT: 120.5 LBS

## 2021-06-02 NOTE — TELEPHONE ENCOUNTER
Last visit: 8/2/19    ASSESMENT/PLAN:  1. Sepsis due to Escherichia coli (E. coli) (H)  Ambulatory referral to Infectious Disease     Urinalysis-UC if Indicated     Urinalysis-UC if Indicated   2. Cognitive decline  Ambulatory referral to Neurology   3. Low potassium syndrome  Basic Metabolic Panel     Basic Metabolic Panel   4. Hyponatremia  Basic Metabolic Panel     Basic Metabolic Panel   5. Screen for colon cancer  Cologuard   6. COPD exacerbation (H)      #1 patient appears to have improved from all infection signs, will recheck UA to see that she has clear infection.  She is advised to continue to drink plenty of water, infectious disease referral was placed in the computer for her today.  Advised that she does need to have a outpatient visit with them concerning this resistant E. Coli.  #2 the patient appears to be at her baseline today for cognition, will get neurology consult for additional testing.  Also recommendation for medication to help with memory loss.  She will continue on her Risperdal and Seroquel for anxiety and depression symptoms and her schizophrenia.  #3 we will recheck BMP to see if she needs any additional potassium, advised her that we will refill her potassium if needed.  #4 hyponatremia most likely related to her acute renal injury, will recheck with the same BMP and advised her to balance fluids and add a little salt daily to her diet.  #5 COPD, continue on current inhalers, no changes, follow-up with pulmonology as scheduled.  #6 screening for colon cancer, health maintenance.  Patient already has a follow-up appointment in September, will keep this appointment for recheck on September 19.  Paola Soria, MS, PA-C 08/06/19      Last refill:  ondansetron (ZOFRAN ODT) 4 MG disintegrating tablet 30 tablet 3 6/10/2019  No   Sig - Route: Take 1 tablet (4 mg total) by mouth every 8 (eight) hours as needed for nausea. - Oral   Sent to pharmacy as: ondansetron (ZOFRAN ODT) 4 MG  disintegrating tablet   E-Prescribing Status: Receipt confirmed by pharmacy (6/10/2019  5:01 PM CDT)

## 2021-06-02 NOTE — TELEPHONE ENCOUNTER
Last Office Visit  8/2/2019 Paola Soria PA-C  Notes:  ASSESMENT/PLAN:  1. Sepsis due to Escherichia coli (E. coli) (H)  Ambulatory referral to Infectious Disease     Urinalysis-UC if Indicated     Urinalysis-UC if Indicated   2. Cognitive decline  Ambulatory referral to Neurology   3. Low potassium syndrome  Basic Metabolic Panel     Basic Metabolic Panel   4. Hyponatremia  Basic Metabolic Panel     Basic Metabolic Panel   5. Screen for colon cancer  Cologuard   6. COPD exacerbation (H)      #1 patient appears to have improved from all infection signs, will recheck UA to see that she has clear infection.  She is advised to continue to drink plenty of water, infectious disease referral was placed in the computer for her today.  Advised that she does need to have a outpatient visit with them concerning this resistant E. Coli.  #2 the patient appears to be at her baseline today for cognition, will get neurology consult for additional testing.  Also recommendation for medication to help with memory loss.  She will continue on her Risperdal and Seroquel for anxiety and depression symptoms and her schizophrenia.  #3 we will recheck BMP to see if she needs any additional potassium, advised her that we will refill her potassium if needed.  #4 hyponatremia most likely related to her acute renal injury, will recheck with the same BMP and advised her to balance fluids and add a little salt daily to her diet.  #5 COPD, continue on current inhalers, no changes, follow-up with pulmonology as scheduled.  #6 screening for colon cancer, health maintenance.  Patient already has a follow-up appointment in September, will keep this appointment for recheck on September 19.  Paola Soria, MS, PAJulienneC 08/06/19         Last Filled:      QUEtiapine (SEROQUEL) 100 MG tablet 60 tablet 3 7/15/2019  No   Sig: TAKE 2 TABLETS BY MOUTH ONCE DAILY AT BEDTIME   Sent to pharmacy as: QUEtiapine (SEROQUEL) 100 MG tablet   Notes to  Pharmacy: Please consider 90 day supplies to promote better adherence   E-Prescribing Status: Receipt confirmed by pharmacy (7/15/2019 10:29 AM CDT)       Next OV:  Visit date not found - nothing scheduled        Medication teed up for provider signature

## 2021-06-02 NOTE — PATIENT INSTRUCTIONS - HE
Continue using daily Symbicort    Continue albuterol every 4 hours if symptomatic    Follow up with primary care physician next week to ensure symptoms are improving

## 2021-06-02 NOTE — TELEPHONE ENCOUNTER
Refill Approved    Rx renewed per Medication Renewal Policy. Medication was last renewed on .  albuterol (VENTOLIN HFA) 90 mcg/actuation inhaler 36 g 4 10/26/2019     Chante Carmichael, [facility] Connection Triage/Med Refill 10/26/2019     Requested Prescriptions   Pending Prescriptions Disp Refills     VENTOLIN HFA 90 mcg/actuation inhaler [Pharmacy Med Name: VENTOLIN HFA        AER] 36 each 4     Sig: INHALE 1 TO 2 PUFFS INTO LUNGS EVERY 4 TO 6 HOURS AS NEEDED       Albuterol/Levalbuterol Refill Protocol Passed - 10/26/2019 12:04 PM        Passed - PCP or prescribing provider visit in last year     Last office visit with prescriber/PCP: 8/2/2019 Paola Soria PA-C OR same dept: 8/2/2019 Paola Soria PA-C OR same specialty: 8/2/2019 Paola Soria PA-C Last physical: 4/25/2019       Next appt within 3 mo: Visit date not found  Next physical within 3 mo: Visit date not found  Prescriber OR PCP: Paola Soria PA-C  Last diagnosis associated with med order: 1. SOB (shortness of breath)  - VENTOLIN HFA 90 mcg/actuation inhaler [Pharmacy Med Name: VENTOLIN HFA        AER]; INHALE 1 TO 2 PUFFS INTO LUNGS EVERY 4 TO 6 HOURS AS NEEDED  Dispense: 36 each; Refill: 4    If protocol passes may refill for 6 months if within 3 months of last provider visit (or a total of 9 months). If patient requesting >1 inhaler per month refill x 6 months and have patient make appointment with provider.

## 2021-06-02 NOTE — TELEPHONE ENCOUNTER
Last Office Visit  8/2/2019 Paola Soria PA-C  Notes:  She had an exacerbation of her COPD while in the hospital, this has also improved since discharge.  She continues on her regular inhalers.    Last Filled:      budesonide-formoterol (SYMBICORT) 160-4.5 mcg/actuation inhaler 1 Inhaler 12 10/24/2018  --   Sig - Route: Inhale 2 puffs 2 (two) times a day. - Inhalation   Sent to pharmacy as: budesonide-formoterol (SYMBICORT) 160-4.5 mcg/actuation inhaler   E-Prescribing Status: Receipt confirmed by pharmacy (10/24/2018  3:50 PM CDT)       Next OV:  Visit date not found    Medication teed up for provider signature

## 2021-06-02 NOTE — TELEPHONE ENCOUNTER
Last Office Visit  8/2/2019 Paola Soria PA-C  Notes:  Yaneth Holguin is seen in follow-up to hospital visit, had a E. coli infection that was resistant, sepsis, hallucinations.  Urine culture was found to have a ESBL E. coli.  She was on Rocephin and then switched to meropenem.  Her cognition improved with the change in medication.  She was consulted with infectious disease while in the hospital.  Since hospital admission she was placed on fosfomycin, 2 doses over a 48-hour time..  She has completed this antibiotic.  She had acute renal injury, secondary to the dehydration.  She has not been taking enough fluid daily.  This was improved on IV fluids, she was warned to avoid nephrotoxic drugs such as NSAIDs and has continued off of these medications.  Neuropsych testing was advised at discharge, she does not have an appointment for this yet.  She had an exacerbation of her COPD while in the hospital, this has also improved since discharge.  She continues on her regular inhalers.  She had an episode of hypotension while in the hospital, improved on IV fluids, close follow-up of her blood pressure was recommended.  She had an episode of hypokalemia while in the hospital, magnesium level was negative, she finished all her potassium supplements.  It was advised to recheck this level.  She was given Aricept at discharge, her pharmacist called for concerns of interaction with her Seroquel so this medication was not started.    Last Filled:       Disp Refills Start End MIRIAM   albuterol (VENTOLIN HFA) 90 mcg/actuation inhaler 36 g 4 3/26/2019  No   Sig: INHALE 1 TO 2 PUFFS BY MOUTH EVERY 4 TO 6 HOURS AS NEEDED   Sent to pharmacy as: albuterol (VENTOLIN HFA) 90 mcg/actuation inhaler   E-Prescribing Status: Receipt confirmed by pharmacy (3/26/2019 11:28 AM CDT)       Next OV:  Visit date not found        Medication teed up for provider signature

## 2021-06-02 NOTE — PROGRESS NOTES
Subjective:   Yaneth Holguin is a 69 y.o. year old female who presents to clinic today for the following health issues:    Chief Complaint   Patient presents with     Shortness of Breath     x1d, need refill for albuterol inhaler     Patient presents with 1 day history of shortness of breath.  Patient has known COPD and takes her Symbicort daily.  She has been using albuterol every 4 hours with some relief.  She is asking for a albuterol refill as she is almost out.  She feels the weather change in the cold air may be with causing her symptoms.  Reports she stopped smoking in January 2019.  She denies URI symptoms including cough, rhinorrhea, sore throat, ear pain.  She also denies chest pain, orthopnea or lower extremity swelling.  She also notes anxiety as likely reason for her shortness of breath.         PMHX:   PAST MEDICAL HISTORY:  Patient Active Problem List   Diagnosis     Hypertension     Osteoporosis     Mixed hyperlipidemia     Herpes Simplex     Generalized anxiety disorder     Esophageal Reflux     Nephrolithiasis     Drug-seeking behavior     C2 cervical fracture (H)     Closed fracture of sternum, unspecified portion of sternum, initial encounter     ONEIDA (generalized anxiety disorder)     Mood disorder due to a general medical condition     Insomnia     Closed fracture of proximal end of right humerus with delayed healing, unspecified fracture morphology, subsequent encounter     Hyperkalemia     Hyponatremia     CHAR (acute kidney injury) (H)     Smoker     Closed 4-part fracture of proximal humerus with delayed healing, right     Anemia associated with acute blood loss     Hypoxia     Mild major depression (H)     Urinary tract infection     Metabolic encephalopathy     Cognitive deficits     Major depressive disorder, recurrent episode, moderate (H)     Hallucinations     Drug overdose     COPD exacerbation (H)     Elevated troponin       CURRENT MEDICATIONS:  Current Outpatient  Medications   Medication Sig Dispense Refill     albuterol (VENTOLIN HFA) 90 mcg/actuation inhaler INHALE 1 TO 2 PUFFS BY MOUTH EVERY 4 TO 6 HOURS AS NEEDED 36 g 4     alendronate (FOSAMAX) 70 MG tablet Take 1 tablet (70 mg total) by mouth every 7 days. Thursdays, take in the AM on empty stomach with a full glass of water 30 mins before food 13 tablet 3     budesonide-formoterol (SYMBICORT) 160-4.5 mcg/actuation inhaler Inhale 2 puffs 2 (two) times a day. 1 Inhaler 12     calcium, as carbonate, (OS-MINDA) 500 mg calcium (1,250 mg) tablet Take 1 tablet by mouth daily.       ergocalciferol (VITAMIN D2) 50,000 unit capsule TAKE 1 CAPSULE BY MOUTH ONCE A WEEK 12 capsule 5     ipratropium-albuterol (COMBIVENT RESPIMAT)  mcg/actuation Mist inhaler Inhale 1 puff 4 (four) times a day. 1 Inhaler 12     mecobalamin, vitamin B12, 1,000 mcg TbDL Place 1 tablet (1,000 mcg total) under the tongue daily.  0     metoprolol tartrate (LOPRESSOR) 100 MG tablet Take 1 tablet (100 mg total) by mouth 2 (two) times a day. 180 tablet 3     omeprazole (PRILOSEC) 20 MG capsule Take 1 capsule (20 mg total) by mouth 2 (two) times a day before meals. 180 capsule 3     ondansetron (ZOFRAN ODT) 4 MG disintegrating tablet Take 1 tablet (4 mg total) by mouth every 8 (eight) hours as needed for nausea. 30 tablet 3     QUEtiapine (SEROQUEL) 100 MG tablet TAKE 2 TABLETS BY MOUTH ONCE DAILY AT BEDTIME 60 tablet 3     risperiDONE (RISPERDAL) 1 MG tablet Take 1 tablet (1 mg total) by mouth 3 (three) times a day. 90 tablet 2     venlafaxine (EFFEXOR-XR) 150 MG 24 hr capsule Take 2 capsules (300 mg total) by mouth daily. 180 capsule prn     acetaminophen (TYLENOL) 500 MG tablet Take 1-2 tablets (500-1,000 mg total) by mouth every 4 (four) hours as needed.  0     donepezil (ARICEPT) 5 MG tablet Take 1 tablet (5 mg total) by mouth at bedtime. 30 tablet 0     gabapentin (NEURONTIN) 300 MG capsule Take 600 mg by mouth 3 (three) times a day.         0      magnesium hydroxide (MAGNESIUM HYDROXIDE) 400 mg/5 mL Susp suspension Take 30 mL by mouth daily as needed.       polyethylene glycol (MIRALAX) 17 gram packet Take 1 packet (17 g total) by mouth daily.  0     thiamine 50 MG tablet Take 1 tablet (50 mg total) by mouth daily. 7 tablet 0     Current Facility-Administered Medications   Medication Dose Route Frequency Provider Last Rate Last Dose     ipratropium-albuterol 0.5-2.5 mg/3 mL nebulizer solution 3 mL (DUO-NEB)  3 mL Nebulization Once Shayy Wiggins, DO           ALLERGIES:  Allergies   Allergen Reactions     Niacin Preparations Itching     Penicillins Itching     Pt states itching and welts as reaction.  Tolerated cefazolin 1/4/19     Nitrofurantoin Monohyd/M-Cryst Other (See Comments)     Reaction unknown     Adhesive Tape-Silicones Hives              Objective:     Vitals:    10/26/19 0922   BP: 126/76   Patient Site: Right Arm   Patient Position: Sitting   Cuff Size: Adult Regular   Pulse: 95   Resp: 26   Temp: 97.3  F (36.3  C)   TempSrc: Axillary   SpO2: 95%     There is no height or weight on file to calculate BMI.    General:  Jittery  HEENT: moist mucous membranes, pupils equal, round, reactive to light and accomodation, posterior oropharynx clear of erythema or exudate, tympanic membranes are pearly grey on right, cerumen impaction left  Lymph: no cervical or supraclavicular lymphadenopathy  Cardiovascular: regular rate and rhythm with no murmur  Pulmonary: Tachypnea, faint lung sounds throughout with minimal expiratory wheeze, no crackles or rhonchi  Extremities: warm and well perfused with no edema  Skin: warm and dry with no rash  Psych:  Anxious    No results found for this or any previous visit (from the past 24 hour(s)).    Assessment and Plan:   1. SOB (shortness of breath)  2. Chronic bronchitis, unspecified chronic bronchitis type (H)  3. Anxiety  Patient presents with 1 day history of shortness of breath, thought to be related to the cold  air and change of weather.  On exam patient is very anxious, tachypneic but satting appropriately on room air with minimal expiratory wheeze on lung exam.  DuoNeb performed in clinic with some relief.  Her albuterol was refilled and she was encouraged to continue using her daily Symbicort.  Do not feel she has an acute exacerbation and therefore antibiotics and steroids not warranted.  Underlying anxiety also seems to be an associated component to her symptoms.  Discussed using albuterol every 4 hours and to follow-up with primary care physician next week to ensure symptoms are improving.  - albuterol (VENTOLIN HFA) 90 mcg/actuation inhaler; INHALE 1 TO 2 PUFFS BY MOUTH EVERY 4 TO 6 HOURS AS NEEDED  Dispense: 36 g; Refill: 4      Shayy Wiggins DO

## 2021-06-03 VITALS
DIASTOLIC BLOOD PRESSURE: 76 MMHG | HEART RATE: 95 BPM | OXYGEN SATURATION: 95 % | SYSTOLIC BLOOD PRESSURE: 126 MMHG | TEMPERATURE: 97.3 F | RESPIRATION RATE: 26 BRPM

## 2021-06-03 VITALS
WEIGHT: 105 LBS | BODY MASS INDEX: 18.02 KG/M2 | BODY MASS INDEX: 18.02 KG/M2 | BODY MASS INDEX: 17.29 KG/M2 | HEIGHT: 64 IN | HEIGHT: 64 IN | WEIGHT: 105 LBS | BODY MASS INDEX: 17.29 KG/M2

## 2021-06-03 VITALS — BODY MASS INDEX: 18.37 KG/M2 | WEIGHT: 107 LBS

## 2021-06-03 VITALS — WEIGHT: 105.1 LBS | BODY MASS INDEX: 18.04 KG/M2

## 2021-06-03 VITALS — WEIGHT: 103 LBS | BODY MASS INDEX: 17.58 KG/M2 | HEIGHT: 64 IN

## 2021-06-03 VITALS — HEIGHT: 64 IN | WEIGHT: 107.9 LBS | BODY MASS INDEX: 18.42 KG/M2

## 2021-06-03 VITALS — HEIGHT: 64 IN | BODY MASS INDEX: 18.23 KG/M2 | WEIGHT: 106.8 LBS

## 2021-06-03 VITALS
RESPIRATION RATE: 20 BRPM | TEMPERATURE: 98.3 F | WEIGHT: 99.4 LBS | OXYGEN SATURATION: 95 % | DIASTOLIC BLOOD PRESSURE: 68 MMHG | BODY MASS INDEX: 17.06 KG/M2 | SYSTOLIC BLOOD PRESSURE: 144 MMHG | HEART RATE: 88 BPM

## 2021-06-03 VITALS — BODY MASS INDEX: 17.77 KG/M2 | WEIGHT: 103.5 LBS

## 2021-06-03 NOTE — TELEPHONE ENCOUNTER
----- Message from Paola Soria PA-C sent at 11/20/2019  1:28 PM CST -----  Labs are normal, please call results to the patient or send a letter if not reachable by phone.

## 2021-06-04 VITALS
OXYGEN SATURATION: 95 % | WEIGHT: 109 LBS | RESPIRATION RATE: 18 BRPM | HEART RATE: 82 BPM | DIASTOLIC BLOOD PRESSURE: 72 MMHG | SYSTOLIC BLOOD PRESSURE: 130 MMHG | HEIGHT: 64 IN | BODY MASS INDEX: 18.61 KG/M2

## 2021-06-04 VITALS
BODY MASS INDEX: 20.05 KG/M2 | DIASTOLIC BLOOD PRESSURE: 76 MMHG | SYSTOLIC BLOOD PRESSURE: 136 MMHG | RESPIRATION RATE: 40 BRPM | TEMPERATURE: 97.7 F | OXYGEN SATURATION: 90 % | WEIGHT: 116.8 LBS | HEART RATE: 91 BPM

## 2021-06-04 VITALS
SYSTOLIC BLOOD PRESSURE: 114 MMHG | BODY MASS INDEX: 20.3 KG/M2 | OXYGEN SATURATION: 95 % | DIASTOLIC BLOOD PRESSURE: 62 MMHG | HEART RATE: 68 BPM | RESPIRATION RATE: 18 BRPM | WEIGHT: 118.9 LBS | HEIGHT: 64 IN

## 2021-06-04 NOTE — TELEPHONE ENCOUNTER
Called and spoke with patient.  She reports that her last dose of the azithromycin (ZITHROMAX Z-UYEN) 250 MG tablet is tomorrow.  She continues to have shortness of breath with activities.    She is requesting new prescription for Levaquin. States that she has used this antibiotic in the past and it is what seems to work for her symptoms.

## 2021-06-04 NOTE — TELEPHONE ENCOUNTER
See my chart message dated 12/29/19    Reason for Disposition    MODERATE longstanding difficulty breathing (e.g., speaks in phrases, SOB even at rest, pulse 100-120) and SAME as normal    MILD difficulty breathing (e.g., minimal/no SOB at rest, SOB with walking, pulse < 100) of new onset or worse than normal    Protocols used: BREATHING DIFFICULTY-A-OH

## 2021-06-05 NOTE — TELEPHONE ENCOUNTER
risperiDONE (RISPERDAL) 1 MG tablet 90 tablet 2 9/24/2019  No   Sig - Route: Take 1 tablet (1 mg total) by mouth 3 (three) times a day. - Oral   Sent to pharmacy as: risperiDONE 1 mg tablet (RisperDAL)   E-Prescribing Status: Receipt confirmed by pharmacy (9/24/2019  1:29 PM CDT)

## 2021-06-05 NOTE — TELEPHONE ENCOUNTER
Last Office Visit  8/2/2019 Paola Soria PA-C -INF  Notes:      Last Filled:    risperiDONE (RISPERDAL) 1 MG tablet 90 tablet 2 9/24/2019  No       Next OV:  Visit date not found  - nothing scheduled at this time        Medication teed up for provider signature

## 2021-06-06 NOTE — TELEPHONE ENCOUNTER
Called and informed that Regan is not on Pt consent to communicate.  Informed that we could not discuss any information from Pt chart.  Regan was understanding of this.

## 2021-06-06 NOTE — TELEPHONE ENCOUNTER
Tried to reach Serena again to see if more likely to  phone on weekend; she is not answering phone. Tried patient's cell too.    Will try again next week.    Dr. Canseco

## 2021-06-06 NOTE — PROGRESS NOTES
Community Health Worker called and left a message for the patient.  If the patient is returning my call, please transfer the patient to [facility] at ext. [phone].   Patient has been mailed a unreachable letter and was provided with CHW contact information if they are interested in accessing Clinic Care Coordination.  Order for Care Management has been closed, no further outreach will be done at this time and patient can be re-referred.

## 2021-06-06 NOTE — PROGRESS NOTES
Clinic Care Coordination Contact  [facility]/Voicemail      Clinical Data: Care Coordinator Outreach  Outreach attempted x 1.  Left message on patient's voicemail with call back information and requested return call.  Plan: . Care Coordinator 1-2 business days.      Notes:  NSx1  Schedule with RN Scheduled   [facility] Prescription Program  QMB/SLMB?            Ordering User: Christy Lara RN Department: [facility]   Auth Provider: FRANKLIN SCHNEIDER Provider: Christy Lara RN   Diagnosis: Anxiety  COPD exacerbation (H)  Major depressive disorder, recurrent episode, moderate (H)     Indications of Use:        Expected Date:       Order Comments: Hospitalized 2/9-2/12 with COPD exacerbation. Pt also has significant anxiety. With the new year, her inhalers are very expensive and she is looking for any financial assistance. Would she qualify for QMB or SLMB? Also, She would be an excellent candidate for pulmonary rehab. I introduced the idea and suggested she talk with her pulmonologist at her 3/6 appt. See if she followed through

## 2021-06-06 NOTE — TELEPHONE ENCOUNTER
Spoke with Yaneth. She is feeling well today and in the green zone. She is able to get and take her medications and is compliant in taking them.  Reviewed COPD Action Plan.Reminded her of upcoming PFT and Pulmonary appointments and to bring her Action Plan to appointments for discussion with MD. Yaneth had no problems or questions for me today.

## 2021-06-06 NOTE — TELEPHONE ENCOUNTER
Tried to reach Pam Kiffe again to offer my condolences and offer any questions friend might have about Yaneth passing away. The voicemail states that phone belongs to a Brad Kiffe. For this reason, not leaving voicemail.    Will no longer be reaching out to patient's friend any further given many failed attempts. Discussed with her prior PCP Paola Soria who had treated her over last year to get more social history on patient as I'd met her only briefly in last 2 weeks. Per prior PCP, patient only came in with friend in the past.    Dr. Canseco

## 2021-06-06 NOTE — PROGRESS NOTES
Scheduled Follow Up Call: Attempt 1 Community Health Worker called and left a message for the patient. If the patient is returning my call, please transfer the patient to [facility] at [phone].          Ordering User: Christy Lara RN Department: [facility]   Auth Provider: FRANKLIN SCHNEIDER Provider: Christy Lara RN   Diagnosis: Anxiety  COPD exacerbation (H)  Major depressive disorder, recurrent episode, moderate (H)     Indications of Use:        Expected Date:       Order Comments: Hospitalized 2/9-2/12 with COPD exacerbation. Pt also has significant anxiety. With the new year, her inhalers are very expensive and she is looking for any financial assistance. Would she qualify for QMB or SLMB? Also, She would be an excellent candidate for pulmonary rehab. I introduced the idea and suggested she talk with her pulmonologist at her 3/6 appt. See if she followed through

## 2021-06-06 NOTE — PROGRESS NOTES
Clinic Care Coordination Contact     Pt was a no show for initial CCC SW phone assessment x1. Offer to reschedule at next outreach if needed.    CHW/FRW to address whether or not pt qualifies for QMB and/or SLMB. Provide [place] Prescription Assistance Program number (Ph: [phone]) to address copays related to inhalers.

## 2021-06-06 NOTE — TELEPHONE ENCOUNTER
Follow up phone call for the COPD Program. Spoke with Yaneth today for ongoing COPD Education. She is tired but she feels good. Concerns on her Symbicort possibly not being covered anymore. No other questions or concerns.

## 2021-06-06 NOTE — PROGRESS NOTES
I called and spoke to Yaneth's brother as I received email from &MANOLO to assign cause of death.    I called and spoke to Regan who answered her phone. Apparently a friend visited Yaneth on Saturday 2/29 after she wasn't picking up her phone. She was found dead in her chair. Medical examiner was contacted. She was found near inhalers in her chair, appeared she passed naturally. They will not be obtaining autopsy, as medical examiner felt it was a natural death due to her respiratory failure. She is going to be cremated.    I will let Dr. Drew with whom she has a COPD follow up appointment ccheduled with for next week.    Originally she was scheduled to follow up with Dr. Zo García on 3/6/2020 later this week. I had messaged Dr. García when I last saw patient on 2/25/2020 when I extended her course of prednisone for her COPD exacerbation, to see if she had any other recommendations beyond extending her prednisone until she was seen by pulmonology this week. I will also let Dr. García know of patient's unexpected death.     I expressed my condolences to brother and their family.    Dr. Canseco

## 2021-06-06 NOTE — PROGRESS NOTES
Hospital Follow-up Visit:    Assessment/Plan:     1. COPD exacerbation (H)  She continues to have diffuse wheezing on exam today.  She was not hypoxic.  She was given a DuoNeb with some improvement in the wheezing.  Chest x-ray was done showing healing of right rib fractures and minimal new infiltrate or atelectasis in the right middle lobe.  She does not have any fever and otherwise is feeling well besides the chest tightness and shortness of breath.  Overall though she is improving from her hospitalization.  Given her sputum production and her symptoms still, I opted to treat her with doxycycline.  I will give her another 7 days of steroids.  She is to follow-up with me in 5 days to make sure that she is improving.  If she has any worsening symptoms on this regimen, discussed with her that she should seek medical attention.  - ipratropium-albuteroL 0.5-2.5 mg/3 mL nebulizer solution 3 mL (DUO-NEB)  - XR Chest 2 Views  - predniSONE (DELTASONE) 20 MG tablet; Take 40 mg by mouth daily for 3 days, THEN 20 mg daily for 4 days.  Dispense: 10 tablet; Refill: 0  - doxycycline (VIBRAMYCIN) 100 MG capsule; Take 1 capsule (100 mg total) by mouth 2 (two) times a day for 7 days.  Dispense: 14 capsule; Refill: 0    2. Pneumonia of right middle lobe due to infectious organism (H)  See above    3. Encounter for smoking cessation counseling  She is smoking 10 cigarettes a day.  She would like to try Wellbutrin for smoking cessation.  In the past she tried Chantix however it was too expensive.  - buPROPion (WELLBUTRIN SR) 150 MG 12 hr tablet; Take 150mg once daily for 3 days, then 150mg twice a day.  Dispense: 60 tablet; Refill: 1    4. Iron deficiency  Hemoglobin was 9.0 in the hospital.  This is microcytic.  Ferritin was 13.  Iron was 14.  She was started on iron supplementation in the hospital.  We did not address this today.  This can be addressed at the next visit in follow-up.    Follow-up in 5 days    Kimberley Frias  MD Bhavana  Internal Medicine and Pediatrics  [facility]  Pager [phone]       Subjective:     Yaneth Holguin is a 70 y.o. female who presents for a hospital discharge follow up.    Overall she is feeling better than when she left the hospital.  She completed her prednisone 2 days ago.  She completed her azithromycin several days ago.  Ever since finishing her prednisone, she has felt little bit of tightness in her chest.  She is still wheezing and has been coughing.  Sometimes a little brown phlegm comes up.  Denies any fever.  No chest pain.  She is taking her Symbicort twice a day as prescribed.    She is still smoking about 10 cigarettes a day.  She would like to try Wellbutrin for smoking cessation.  In the past she was on Chantix however that was too expensive.    She saw her psychiatrist recently.  She is on effexor for anxiety. Her psychiatrist wants her to start on Seroquel.  She has not started this yet, she needs to  the prescription for this.    Hospital/Nursing Home/ Rehab Facility: [facility]  Date of Admission: 2/9/20  Date of Discharge:2/12/20  Reason(s) for Admission:SOB            Do you have any problems taking your medication regularly?  None       Have you had any changes in your medication since discharge? Yes Seroquel       Have you had any difficulty following your discharge or treatment plan?  Yes    Summary of hospitalization:  Hospital discharge summary reviewed     The patient was hospitalized for COPD exacerbation.  She did require BiPAP initially to maintain her oxygen saturations.  At the time of discharge though she did not require any oxygen.  She was treated with steroids and azithromycin.  While in the hospital she was also started on iron for iron deficiency anemia.  She is to follow-up with her PCP for this.    Diagnostic Tests/Treatments reviewed.  Follow up needed: iron deficiency  Other Healthcare Providers Involved in Patient's  "Care: Patient Care Team:  Paola Soria PA-C as PCP - General (Physician Assistant)  Paola Soria PA-C as Assigned PCP      Update since discharge: {improved, but still difficulty breathing   Information from family, SNF, care coordination: none     Post Discharge Medication Reconciliation: discharge medications reconciled and changed, per note/orders (see AVS)  Plan of care communicated with: patient    Objective:     Vitals:    02/19/20 1221   Pulse: 68   Resp: 18   SpO2: 95%   Weight: 118 lb 14.4 oz (53.9 kg)   Height: 5' 4\" (1.626 m)   LMP: 10/29/1986         Physical Exam:  /62 (Patient Site: Left Arm, Patient Position: Sitting, Cuff Size: Adult Regular)   Pulse 68   Resp 18   Ht 5' 4\" (1.626 m)   Wt 118 lb 14.4 oz (53.9 kg)   LMP 10/29/1986   SpO2 95%   BMI 20.41 kg/m      General Appearance:    Alert, cooperative, no distress, appears stated age, speaking in full sentences, not in any acute respiratory distress   Head:    Normocephalic, without obvious abnormality, atraumatic   Lungs:     Diffuse expiratory wheezing without any crackles, mild increased work of breathing but no retractions    Heart:    Regular rate and rhythm, S1 and S2 normal, no murmur, rub    or gallop   Neurologic:   CNII-XII intact, normal strength, sensation and reflexes     throughout           Coding guidelines for this visit:  Type of Medical   Decision Making Face-to-Face Visit       within 7 Days of discharge Face-to-Face Visit        within 14 days of discharge   Moderate Complexity [id number]   High Complexity 60617 [id number]       Electronically signed by Kimberley Bateman MD 02/19/20 12:22 PM   "

## 2021-06-06 NOTE — TELEPHONE ENCOUNTER
New Appointment Needed  What is the reason for the visit:    Inpatient/ED Follow Up Appt Request  At what hospital or facility were you seen?: [facility]   What is the reason you were seen?: Shortness of breath   What date were you admitted?: date: 02/09  What date were you discharged?: date: 02/12  What was the recommended timeframe for your follow up appointment?: as soon as able. Patient is wanting to be seen next week with any female provider. She is aware that Paola Soria will be out of the office.  Provider Preference: Any female provider  How soon do you need to be seen?: next week  Waitlist offered?: No  Okay to leave a detailed message:  Yes    Please call to go over options today if possible.

## 2021-06-06 NOTE — PATIENT INSTRUCTIONS - HE
1. Start taking the doxycycline and prednisone.  2. Follow up in 5 days. If you are having worsening symptoms (fever, worsening shortness of breath, chest pain, or rapid/difficulty breathing), go to the emergency immediately.  3. If you are not improving, come back in 2 days before the weekend.  4. I have also sent a prescription for the wellbutrin for smoking cessation.

## 2021-06-06 NOTE — PROGRESS NOTES
The Clinic Community Health Worker spoke with the patient today to discuss possible Clinic Care Coordination enrollment.  The service was described to the patient and immediate needs were discussed.  The patient agreed to enrollment and an assessment was scheduled.  The patient was provided with contact information for the clinic CHW.             Assessment date: 2/20/2020 at 11:00AM      Notes:  Patient would like resources about medical  transportation

## 2021-06-06 NOTE — TELEPHONE ENCOUNTER
Tried to reach Serena Deionfe x 3. Did not leave voicemail.     Wanted to reach out to patient's listed contact (ok to consent to communicate) to offer my condolences to her friend who appeared to be very involved in her cares.    Will try again next week.    Dr. Canseco

## 2021-06-06 NOTE — PROGRESS NOTES
TCM DISCHARGE FOLLOW UP CALL    Discharge Date:  2/11/2020  Reason for hospital stay (discharge diagnosis)::  COPD exacerbation  Are you feeling better, the same or worse since your discharge?:  Patient is feeling better (Denies dyspnea, cough. She is tired. pt isn't smoking)  Do you feel like you have a plan in the event of a health emergency?: Yes (Neighbor)    As part of your discharge plan, were  home care services ordered for you?: No    Did you receive any new medications, or was there a change to your medications?: Yes    Are you taking those medications, or do you have any established regiment?:  Pt is taking azithromycin  And ferrous sulfate daily. She is taking prednisone 40 mg daily today and tomorrow and correctly reported she will decreased to 20 mg (one tablet) on 2/15 until gone. Pt needs more nicotine patches, states she has someone that can pick them up for her. Pt is concerned that Zofran was discontinued. She will talk to PCP about it at her next OV. Pt needs it for nausea when anxiety worsens.  Do you have any follow up visits scheduled with your PCP or Specialist?:  Yes, with Specialist and No  RN NOTES::  Pt cancelled today's appt because of the cold weather. She will reschedule. Has appt with Pulm 3/6. Pt would benefit from pulmonary rehab and this RN suggested she talk with her pulmonologist about it. Pt has agreed to [facility] referral.

## 2021-06-06 NOTE — TELEPHONE ENCOUNTER
2 Week follow up phone call for the COPD Program. No answer. Left message with call back number for any questions or concerns.

## 2021-06-06 NOTE — PROGRESS NOTES
[facility]  Internal Medicine - Office Visit    Patient: aYneth Holguin   MRN: [id number]   Date of Service: 02/25/20   Patient Care Team:  Kimberley Bateman MD as PCP - General (Internal Medicine)  Paola Soria PA-C as Assigned PCP    ASSESSMENT/PLAN     Yaneth Holguin is a 70-year-old woman with history of COPD    Diagnoses and all orders for this visit:    COPD exacerbation (H)  Recent hospitalization from February 9 through February 12 for COPD exacerbation, briefly requiring BiPAP during the hospitalization.  Seen in follow-up on February 19 with ongoing shortness of breath, wheezing, and sputum production after completing a 5 day course of azithromycin and 10-day course of steroid taper.  Chest x-ray at that time showed minimal right middle lobe infiltrate.  I gave her another 7 days of prednisone (40mg x 3 days, then 20mg x 4 days) and 7 day of doxycycline which she completed yesterday. Reassuringly no more sputum production or cough, however still having diffuse wheezing on exam and also shortness of breath. Will give her a longer steroid course until she can get in to see her pulmonologist in 10 days. Discussed with her that if she has worsening respiratory distress, fevers, or recurrence of sputum, she needs to be seen immediately. I will also reach out to her pulmonologist to see if he has any other recommendations before she sees her on 3/6/2020.  -     predniSONE (DELTASONE) 20 MG tablet; Take 20 mg by mouth daily for 4 days, THEN 10 mg daily for 6 days.  Dispense: 7 tablet; Refill: 0    Iron deficiency  Taking iron started in hospital. We have yet to discuss this. She should get repeat studies in March.    Smoking cessation  Started wellbutrin. Has cut down smoking.    Kimberley Bateman MD  Internal Medicine and Pediatrics  [facility]  Pager [phone]    SUBJECTIVE     Yaneth Holguin is a 70-year-old woman with history of COPD who  is here for follow-up.  Briefly the patient was hospitalized from February 9 through February 12.  She did require BiPAP initially to maintain her oxygen saturations while in the hospital.  She was treated with steroids and azithromycin.  When I saw her in follow-up on February 19, she had completed a 10-day course of prednisone and a 5-day course of azithromycin.  Due to ongoing shortness of breath, chest tightness, and a chest x-ray which showed a right middle lobe pneumonia, I gave her a seven-day course of doxycycline and another 7-day course of prednisone.  She did prednisone 40 mg daily for 3 days, then 20 mg for 4 days.  Her last dose of prednisone was yesterday.  She is now done with her doxycycline.  She reports that she is no longer having any cough or sputum production.  No fevers.  However she continues to feel really tired and is still having the shortness of breath.  She has been using her rescue inhaler a couple times a day.    In the past she tells me that she has needed a longer course of 2 weeks of a prednisone taper for a COPD exacerbation.  She is seeing her lung doctor in 10 days on Friday.    She started the Wellbutrin for smoking cessation.    Review of Systems  Pertinent items are noted in HPI    Family History  Pertinent items are noted in HPI     Social History  Pertinent items are noted in HPI    Immunizations  Reviewed.    Past Medical/Surgical History  Reviewed and updated as appropriate    Medications    Current Outpatient Medications:      acetaminophen (TYLENOL) 500 MG tablet, Take 1-2 tablets (500-1,000 mg total) by mouth every 6 (six) hours as needed., Disp: , Rfl: 0     albuterol (VENTOLIN HFA) 90 mcg/actuation inhaler, INHALE 1 TO 2 PUFFS BY MOUTH EVERY 4 TO 6 HOURS AS NEEDED, Disp: 36 g, Rfl: 4     alendronate (FOSAMAX) 70 MG tablet, Take 1 tablet (70 mg total) by mouth every 7 days. Thursdays, take in the AM on empty stomach with a full glass of water 30 mins before food, Disp:  13 tablet, Rfl: 3     budesonide-formoterol (SYMBICORT) 160-4.5 mcg/actuation inhaler, Inhale 2 puffs 2 (two) times a day., Disp: 1 Inhaler, Rfl: 12     buPROPion (WELLBUTRIN SR) 150 MG 12 hr tablet, Take 150mg once daily for 3 days, then 150mg twice a day., Disp: 60 tablet, Rfl: 1     calcium, as carbonate, (OS-MINDA) 500 mg calcium (1,250 mg) tablet, Take 1 tablet by mouth daily., Disp: , Rfl:      ergocalciferol (VITAMIN D2) 50,000 unit capsule, TAKE 1 CAPSULE BY MOUTH ONCE A WEEK, Disp: 12 capsule, Rfl: 5     ferrous sulfate 325 (65 FE) MG tablet, Take 1 tablet (325 mg total) by mouth daily with breakfast., Disp: , Rfl: 0     ibuprofen (ADVIL,MOTRIN) 200 MG tablet, Take 200-400 mg by mouth every 8 (eight) hours as needed for pain., Disp: , Rfl:      ipratropium-albuterol (COMBIVENT RESPIMAT)  mcg/actuation Mist inhaler, Inhale 1 puff 4 (four) times a day., Disp: 1 Inhaler, Rfl: 12     mecobalamin, vitamin B12, 1,000 mcg TbDL, Place 1 tablet (1,000 mcg total) under the tongue daily., Disp: , Rfl: 0     metoprolol tartrate (LOPRESSOR) 100 MG tablet, Take 1 tablet (100 mg total) by mouth 2 (two) times a day., Disp: 180 tablet, Rfl: 3     mirtazapine (REMERON) 15 MG tablet, Take 15 mg by mouth at bedtime., Disp: , Rfl:      omeprazole (PRILOSEC) 20 MG capsule, Take 1 capsule (20 mg total) by mouth 2 (two) times a day before meals., Disp: 180 capsule, Rfl: 3     QUEtiapine (SEROQUEL) 100 MG tablet, Take 200 mg by mouth at bedtime., Disp: , Rfl:      venlafaxine (EFFEXOR-XR) 150 MG 24 hr capsule, Take 2 capsules (300 mg total) by mouth daily., Disp: 180 capsule, Rfl: prn     predniSONE (DELTASONE) 20 MG tablet, Take 20 mg by mouth daily for 4 days, THEN 10 mg daily for 6 days., Disp: 7 tablet, Rfl: 0    Allergies  Allergies   Allergen Reactions     Niacin Preparations Itching     Penicillins Itching     Pt states itching and welts as reaction.  Tolerated cefazolin 1/4/19     Nitrofurantoin Monohyd/M-Cryst Other  "(See Comments)     Reaction unknown     Adhesive Tape-Silicones Hives            OBJECTIVE       /72 (Patient Site: Right Arm, Patient Position: Sitting, Cuff Size: Adult Regular)   Pulse 82   Resp 18   Ht 5' 4\" (1.626 m)   Wt 109 lb (49.4 kg)   LMP 10/29/1986   SpO2 95%   BMI 18.71 kg/m      General Appearance:   Alert, cooperative, no distress, appears stated age, speaking in full sentences in no respiratory distress   HEENT:  oropharynx is normal   Lungs:     Diffuse expiratory wheezing bilaterally    Heart:    Regular rate and rhythm, S1 and S2 normal, no murmur, rub    or gallop   Neurologic:   CNII-XII grossly intact, normal strength grossly       Labs/imaging/studies:  See above        "

## 2021-06-06 NOTE — TELEPHONE ENCOUNTER
Who is calling:  Regan Houston, brother   Reason for Call:  Yaneth recently passed away on 2/29/20, brother Regan would like to know if he can speak with Paola Soria. He has some questions/discuss some of medications prescribed to her, in particular for COPD. Regan is executor of Yaneth's will.  Date of last appointment with primary care: 2/25/20  Okay to leave a detailed message: Yes, please call Regan at [phone]

## 2021-06-06 NOTE — PROGRESS NOTES
Clinic Care Coordination Contact     Situation: Pt chart reviewed by SW care coordinator.     Background: Pt had a phone visit scheduled with CCC RN for 3/13/20 which was rescheduled with CCC SW for 3/12/20. Per chart review, however, pt passed away on 20.     Assessment: Unable to complete. Enrollment status adjusted. No further outreach will be attempted given pt is .     Plan/Recommendations:   1.) Referral for CCC to be removed from work queue at this time.

## 2021-06-07 NOTE — PROGRESS NOTES
"Received letter from brother Regan asking me to fill out a \"proof of death\" document by life insurance so he can put claim on the policy.    I have given document to clinic manager, Rachel Oliver, to review to see if I can fill this out for him.    Dr. Canseco  "

## 2021-06-08 NOTE — PROGRESS NOTES
ASSESSMENT:  1.  Major depression with chronic anxiety disorder: Yaneth scores 19 on PHQ-9.  She ascribes most of her issues to work related stress.  She does not feel she could change her current position.  Is hoping to retire in one .  She currently is treated with Effexor and is using lorazepam 4 times daily.  She feels the lorazepam has been of less benefit recently.  She has been on numerous other medications including Citalopram, mirtazapine, Paxil, Seroquel and clonazepam.  Also Abilify.  She is using hydroxyzine at night along with zolpidem.  She still notes frequent awakening  2.  Severe osteoporosis with history of wrist and vertebral fractures.  She is on prolia  PLAN:  1.  Add Abilify 5 mg daily in the AM.  Lorazepam use should be limited to a maximum of 4 tablets daily.  She acknowledges that she has been using more than that on occasion.  2.  Continue Effexor for now.  Report effect of Abilify on symptoms in 2 to 3 weeks.  3.  Counseling was advised  4.  DXA scan due in October    Medications Discontinued During This Encounter   Medication Reason     ARIPiprazole (ABILIFY) 10 MG tablet Therapy completed     metoprolol tartrate (LOPRESSOR) 100 MG tablet Therapy completed     metoprolol tartrate (LOPRESSOR) 100 MG tablet Therapy completed     mirtazapine (REMERON SOL-TAB) 15 MG disintegrating tablet Therapy completed     ramelteon (ROZEREM) 8 mg tablet Therapy completed           ASSESSED PROBLEMS:  No diagnosis found.    CHIEF COMPLAINT:  Chief Complaint   Patient presents with     Osteoporosis     had a vertaplasty a few months ago and is now having upper back pain     Anxiety     has been taking lorazepam for past 5-6 years and she seems she is immune to it now and its not working     Back Pain     has been using hydrocodone that is not helping any longer       HISTORY OF PRESENT ILLNESS:  Yaneth is a 66 y.o. female presenting to the clinic today to establish care with complaints of anxiety. She was a  patient of Dr. Arrington but was referred by multiple people. She has significant anxiety from work that is worsening. She is limited in ways to improve her work situation but [facility] is the only solution that she can come up with. She hopes to make it through another year and then retire. She saw a psychologist who has been helping her. She sees the psychologist once a month. She has been on multiple medications in the past. Citalopram, Paxil, Seroquel, and Abilify have not improved her anxiety. Seroquel did not help with sleep either. She was on Abilify for 3 months and stopped Seroquel a month or so ago. Effexor has been the most beneficial for her but the effects have reached a plateau. Effexor was decreased to 300 mg per day for fear of diarrhea. She has also taken the same dose of lorazepam for 6 years but effects are no longer noticeable. She takes lorazepam 3-4 times a day. Anxiety causes nausea and insomnia. Anxiety is worst in the morning.     Insomnia: She has a hard time sleeping despite using zolpidem. She sleeps for a couple hours and then is up and about.     Osteoporosis: She has had three Prolia shots. She is due for a DXA in the fall. Her last DXA in 2015 showed the lowest T-score of -3.2 in the lumbar spine.     REVIEW OF SYSTEMS:   She has hot flashes. All other systems are negative.    PFSH:  She hopes to retire in a year.     History   Smoking Status     Current Every Day Smoker     Packs/day: 1.00     Years: 45.00   Smokeless Tobacco     Never Used     Comment: Green smoking cessation folder given       Family History   Problem Relation Age of Onset     COPD Mother      Lung cancer Brother      COPD Father       age 84     Hypertension Sister        Social History     Social History     Marital status:      Spouse name: N/A     Number of children: N/A     Years of education: N/A     Occupational History     Not on file.     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  She has 2 children and 4 grandchildren.       Past Surgical History   Procedure Laterality Date     Pr remove tonsils/adenoids,<13 y/o       Description: Tonsillectomy With Adenoidectomy;  Recorded: 08/06/2008;     Pr evacuate mole of uterus       Description: Dilation And Curettage For Hydatidiform Mole;  Recorded: 08/06/2008;     Pr total hip arthroplasty Bilateral      Description: Total Hip Replacement;  Recorded: 09/08/2008;  Comments: for AVN; Lt. 5/07 and had to be redone because of Fx; Rt. done 8/08     Pr total abdom hysterectomy  1986     Description: Total Abdominal Hysterectomy;  Recorded: 08/30/2010;     Wrist fracture surgery Left 5/2015     Vertebroplasty  12/2015     T4       Allergies   Allergen Reactions     Niacin Preparations Itching     Nitrofurantoin Monohyd/M-Cryst Other (See Comments)     Reaction unknown     Penicillins Itching     Pt states itching and welts as reaction.     Adhesive Tape-Silicones Hives       Active Ambulatory Problems     Diagnosis Date Noted     Hypertension      Osteoporosis      Hyperlipidemia      Herpes Simplex      Avascular Necrosis      Generalized Anxiety Disorder      Esophageal Reflux      Nephrolithiasis      Mood disorder in conditions classified elsewhere 12/15/2014     Distal radius fracture 05/30/2015     Resolved Ambulatory Problems     Diagnosis Date Noted     Acute bronchitis      Past Medical History   Diagnosis Date     Anxiety      Endometriosis      Fall 05/30/2015     GERD (gastroesophageal reflux disease)      History of kidney stones      Hyperlipidemia      Hypertension      Insomnia      Left elbow  "fracture 02/2015       VITALS:  Vitals:    01/09/17 0938   BP: 124/84   Patient Site: Left Arm   Patient Position: Sitting   Cuff Size: Adult Regular   Pulse: 72   Weight: 113 lb 8 oz (51.5 kg)   Height: 5' 3\" (1.6 m)     Wt Readings from Last 3 Encounters:   01/09/17 113 lb 8 oz (51.5 kg)   12/01/16 110 lb 8 oz (50.1 kg)   08/24/16 114 lb 6.4 oz (51.9 kg)     Body mass index is 20.11 kg/(m^2).    PHYSICAL EXAM:  Constitutional:   Reveals an alert, slender middle-aged woman who appears anxious but otherwise not acutely ill. She is well groomed.  Vitals: per nursing notes.  Detailed exam not done.     ADDITIONAL HISTORY SUMMARIZED (2): Office note 12/1/16 regarding nausea and anxiety.   DECISION TO OBTAIN EXTRA INFORMATION (1): None.   RADIOLOGY TESTS (1): DXA report October 2015  LABS (1): Labs from December 2016 reviewed.   MEDICINE TESTS (1): None.  INDEPENDENT REVIEW (2 each): None.     The visit lasted a total of 16 minutes face to face with the patient. Over 50% of the time was spent counseling and educating the patient about anxiety.    IAmisha, am scribing for and in the presence of, Dr. Jones.    I, Dr. Jones, personally performed the services described in this documentation, as scribed by Amisha Sagastume in my presence, and it is both accurate and complete.    Dragon dictation was used for this note.  Speech recognition errors are a possibility.    MEDICATIONS:  Current Outpatient Prescriptions   Medication Sig Dispense Refill     acyclovir (ZOVIRAX) 5 % ointment Apply thin layer (1/2 gram)to affected area every 3 hours for breakouts. 15 g 2     hydrOXYzine (VISTARIL) 25 MG capsule Take 2 capsules (50 mg total) by mouth bedtime. 180 capsule 5     lidocaine (LIDODERM) 5 % Remove & Discard patch within 12 hours or as directed by MD 30 patch prn     LORazepam (ATIVAN) 1 MG tablet Take 1 tablet (1 mg total) by mouth every 6 (six) hours as needed for anxiety. 120 tablet 0     metoclopramide (REGLAN) 5 " MG tablet Take 1 tablet (5 mg total) by mouth 3 (three) times a day as needed for nausea. 90 tablet 1     omeprazole (PRILOSEC) 20 MG capsule TAKE ONE CAPSULE BY MOUTH TWICE DAILY 180 capsule 3     ondansetron (ZOFRAN, AS HYDROCHLORIDE,) 4 MG tablet Take 1 tablet (4 mg total) by mouth every 8 (eight) hours as needed for nausea. 30 tablet prn     oxyCODONE-acetaminophen (PERCOCET) 7.5-325 mg per tablet Take 1 tablet by mouth every 4 (four) hours as needed for pain. 180 tablet 0     simvastatin (ZOCOR) 40 MG tablet TAKE 1 TABLET BY MOUTH EVERY EVENING 90 tablet PRN     venlafaxine (EFFEXOR-XR) 150 MG 24 hr capsule Take 2 capsules (300 mg total) by mouth daily. 270 capsule 3     VENTOLIN HFA 90 mcg/actuation inhaler INHALE 1 TO 2 PUFFS BY MOUTH EVERY 4 TO 6 HOURS AS NEEDED 18 g 5     VITAMIN D2 50,000 unit capsule TAKE 1 CAPSULE BY MOUTH ONCE A WEEK 12 capsule 5     zolpidem (AMBIEN) 5 MG tablet Take 1 tablet (5 mg total) by mouth bedtime as needed for sleep. 30 tablet 0     ARIPiprazole (ABILIFY) 5 MG tablet Take 1 tablet (5 mg total) by mouth daily. 30 tablet 4     Current Facility-Administered Medications   Medication Dose Route Frequency Provider Last Rate Last Dose     denosumab 60 mg (PROLIA 60 mg/ml)  60 mg Subcutaneous Q6 Months April D MD Nury   60 mg at 11/07/16 1604       Total data points: 4

## 2021-06-08 NOTE — TELEPHONE ENCOUNTER
"06/15/20    I called to speak to Regan. His phone is [phone].    Per Maribel Arboleda, [employer] service system director, because he does not have authority to receive PHI, she suggests referring him to HIM to discuss whether he can provide proof of identify in order to have a copy of her medical record ([email or web address]). Per Maribel, even if he is authorized to receive a copy, that \"does not mean that you are obligated to answer questions or complete the form.\"     I told Regan the first step is to contact the email above to request records. At this time, because he is not listed as someone to consent to communicate with, I cannot fill the form out or provide any other medical information.    Regan has been going back and forth between his  and life insurance company. He will contact them. He is a bit stressed because Mom is dying and coming home on hospice.    I will also email Maribel back to give her his email [email or web address] in case she has any new information.    Dr. Canseco  "

## 2021-06-09 NOTE — PROGRESS NOTES
Medication management                        Medication Therapy Management Initial Visit     ASSESSMENT AND PLAN  1. Generalized anxiety disorder  Poorly controlled and visibly shaking and sweating.  It is unclear if her symptoms are due to poorly controlled anxiety or excess serotonin and norepinephrine from Effexor.  It seems as though her major stressor is work, and unfortunately she is returning to work tomorrow.  Discussed that long-term use of lorazepam and or presently on may not be in her best interest.  We will keep Effexor dose the same today in efforts to not destabilize her anxiety regimen, and look forward to evaluation by Dr. Reid in April.  Due to the excessive shaking and sweating consider alternative medication options that would not increase her risk of serotonin syndrome through alpha2 receptor agonist like clonidine.  Discussed mechanism of action and rationale for using this medication, discussed potential adverse effects that she may encounter.  She demonstrates understanding and will try this new medication.  - cloNIDine HCl (CATAPRES) 0.1 MG tablet; Take 1 tablet (0.1 mg total) by mouth 3 (three) times a day.  Dispense: 90 tablet; Refill: 2    2. Insomnia  Poorly controlled, likely due to poorly controlled anxiety.  Discussed alternative treatment above, recommend a revisit after appointment with psychiatry in April.    3. Chronic pain  Not assessed in depth today, has controlled substance agreement with Dr. Arrington for her Percocet from August 2016.  She does not have a urine drug screen, could consider getting this at follow-up.  -Recommend U tox at follow-up    4. Gastroesophageal reflux disease, esophagitis presence not specified  Stable. Recommended to continue current regimen.     5. Osteoporosis  At goal of actively treating osteoporosis with Prolia, on adequate vitamin D supplementation.  At follow-up recommended to assess current calcium supplementation.  Last DEXA in October  "2015. Recommend continue current regimen.      FOLLOW-UP PLAN  Yaneth was advised to follow up via MyChart and with Dr. Reid as scheduled.    The following instructions were given:   Patient Instructions   Start clonidine 0.1mg by mouth three times daily for anxiety, sleep, sweating.     Please mychart with questions!     Lena       SUBJECTIVE AND OBJECTIVE  Yaneth Holguin is a 67 y.o. female who was referred by Hannah Arrington MD for [facility] services.  Yaneth's chief concern today is medication management.  She was scheduled to previously meet with me however canceled her appointment.  She does have an appointment to see Dr. Reid with psychiatry in April, however her anxiety is very poorly controlled.  She is to work with a nurse practitioner at [facility] but has never worked with a therapist.  She is visibly shaking and sweating in our visit today.  Notes that work is very stressful for her and was recently put on a suspension however returns to work tomorrow.    Anxiety: She is currently on Effexor and has been on this for some time.  She was previously on a higher dose of 450 mg daily and when the dose was decreased down to 300 mg daily notes that there was no change in her sweating and/or anxiety.  It has been over a year since she has been on 300 mg of Effexor daily.  She is also been on lorazepam for years, but had been recently supplemented with alprazolam due to severe anxiety symptoms.  She feels the alprazolam \"works better and quicker.\"  When looking back in her chart she has also been on the following medications: Seroquel, buspirone, risperidone, citalopram, Cymbalta, Depakote, mirtazapine, temazepam, trazodone.  Abilify was added most recently to her current medications however does not believe that this is helped at all.    Insomnia: hydroxyzine doesn't seem to help with relaxing or making her tired. Ambien is effective when she is able to take it, able to get 6-8 hours of sleep otherwise " only getting 4-5 hours of sleep.  It seems as though her insurance only allows her to have 15 tablets per month.  Finds that her insomnia is greatly affected by her anxiety.      Pain: had tried gabapentin which was ineffective for pain, she does not remember if this was helpful at all for her anxiety. Percocet as needed depending on work and pain. Generally takes 3 tabs per day. Denies drowsiness, or constipation. Generally she has loose stools.     Reflux: not having anymore symptoms.     Osteoporosis: Currently on treatment with Prolia.  We did not have time to discuss her calcium supplementation, she is on a replacement dose of vitamin D once weekly which has helped her recent vitamin D levels to be normal.    This note has been dictated using voice recognition software. Any grammatical or context distortions are unintentional and inherent to the software.    Yaneth was provided with follow up instructions, and this care plan was communicated via EMR with her primary care provider, Hannah Arrington MD, and is the authorizing prescriber for this visit.  Direct supervision was available by either the patient's PCP or another available physician when needed.    Time spent: 60 minutes  Level of service: 3    Roldan Haider, PharmD, BCACP  Medication Management (MTM) Pharmacist  [facility]    We reviewed Yaneth's medication list with them, discussing reason for use, directions for use, and potential side effects of each medication.  Indication, safety, efficacy, and convenience was assessed for all reviewed medications.  No environmental factors were noted currently affecting patient.    Current Outpatient Prescriptions   Medication Sig Dispense Refill     acyclovir (ZOVIRAX) 5 % ointment Apply thin layer (1/2 gram)to affected area every 3 hours for breakouts. 15 g 2     albuterol (VENTOLIN HFA) 90 mcg/actuation inhaler INHALE 1 TO 2 PUFFS BY MOUTH EVERY 4 TO 6 HOURS AS NEEDED 18 g 5     LORazepam  (ATIVAN) 1 MG tablet Take 1 tablet (1 mg total) by mouth every 6 (six) hours as needed for anxiety. 100 tablet 0     metoclopramide (REGLAN) 5 MG tablet Take 1 tablet (5 mg total) by mouth 3 (three) times a day as needed for nausea. 90 tablet 3     omeprazole (PRILOSEC) 20 MG capsule TAKE ONE CAPSULE BY MOUTH TWICE DAILY 180 capsule 3     oxyCODONE-acetaminophen (PERCOCET) 7.5-325 mg per tablet Take 1 tablet by mouth every 4 (four) hours as needed for pain. 180 tablet 0     simvastatin (ZOCOR) 40 MG tablet TAKE 1 TABLET BY MOUTH EVERY EVENING 90 tablet PRN     venlafaxine (EFFEXOR-XR) 150 MG 24 hr capsule Take 2 capsules (300 mg total) by mouth daily. 270 capsule 3     VITAMIN D2 50,000 unit capsule TAKE 1 CAPSULE BY MOUTH ONCE A WEEK 12 capsule 5     zolpidem (AMBIEN) 5 MG tablet Take 1 tablet (5 mg total) by mouth bedtime as needed for sleep. 15 tablet 0     cloNIDine HCl (CATAPRES) 0.1 MG tablet Take 1 tablet (0.1 mg total) by mouth 3 (three) times a day. 90 tablet 2     Current Facility-Administered Medications   Medication Dose Route Frequency Provider Last Rate Last Dose     denosumab 60 mg (PROLIA 60 mg/ml)  60 mg Subcutaneous Q6 Months April D MD Nury   60 mg at 11/07/16 7007

## 2021-06-09 NOTE — TELEPHONE ENCOUNTER
I discussed this with Rachel Oliver, my clinic manager.    She will contact the , as at this time I have been our legal team to direct all questions to them.    Dr. Canseco

## 2021-06-09 NOTE — PROGRESS NOTES
Spoke with pt. Pt has been getting early refills of Lorazepam. Previous directions were to take one tab po QID. Dr. Arrington recently decreased it to take one tab po TID. I want to be sure the patient understands that she cannot take in excess of three tabs/day as she is on Oxycodone as well. We cannot continue to refill if patient does not abide by the sig as she is violating her CSA. Patient has upcoming appt to see Psych so hopefully the patient can be weaned down and then discontinue this med and use other treatment modalities while working with Psych. Pt verbalized an understanding of this and my concerns for her safety.

## 2021-06-09 NOTE — TELEPHONE ENCOUNTER
Forms Request  Name of form/paperwork: Other:  Physician Statement - Proof of Death from [employer]  Have you been seen for this request: N/A - patient is   Do we have the form: Yes- Yaneth's brother mailed the form to Dr. Bateman .  Clotilde will fax the doctor another form today.  When is form needed by: As quickly as possible  How would you like the form returned:  Fax a copy to Clotilde at  [phone].  And also mail a copy to the  Box address on the form.  Patient Notified form requests are processed in 3-5 business days: Yes    Okay to leave a detailed message? Yes    Patient  22.  Family is trying to settle her estate and collect on her life insurance policy.
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
## 2021-06-10 NOTE — PROGRESS NOTES
ASSESSMENT:  1.  Dizziness:  On questioning she reports she describes feeling lightheaded with a near syncopal sensation.  Symptoms have been present for several days.  Prominent orthostatic hypotension is noted on exam today.  I cannot accurately obtain a standing blood pressure.  She has been on metoprolol for hypertension.  Clonidine was started on March 21 for treatment of anxiety, with the dose later increased to 0.2 mg 3 times daily.  I would be concerned about hypotension related to beginning the alpha-blocker.  She does have epigastric pain, but no findings to suggest GI bleeding.  A hemoglobin will be checked.  Given the prominence of her symptoms, I would favor ER assessment and IV hydration    PLAN:  1.  Check hemoglobin.  This returned from a high normal range at 15.7.  White count was elevated to 15,300.  2.  Send to the emergency room for further evaluation and IV hydration.  3.  She discontinued the clonidine yesterday.  She did not feel it helped with anxiousness.  I would advise staying off of it due to hypotension    Orders Placed This Encounter   Procedures     HM2(CBC w/o Differential)     Medications Discontinued During This Encounter   Medication Reason     trimethobenzamide (TIGAN) 300 mg capsule Therapy completed       No Follow-up on file.    ASSESSED PROBLEMS:  1. Hypotension  HM2(CBC w/o Differential)       CHIEF COMPLAINT:  Chief Complaint   Patient presents with     Dizziness     started 4-       HISTORY OF PRESENT ILLNESS:  Yaneth is a 67 y.o. female presenting to the clinic today for dizziness. This began on 4 days ago on Thursday night. She describes feeling lightheaded, as if she is going to fall over. She is having difficulty when getting up to a standing position and walking. She feels dizzy with sitting and occasionally with lying down. She denies diarrhea, blood in the stool, nausea or vomiting. Her last bowel movement was yesterday. She has no change in her blood  pressure medications. Her BP today is 100/68 with lying, 74/50 with sitting, and undetectable with standing. Her pulse ox on room air is 98%. She denies shortness of breath.     Anxiety: She has recently consulted with YULISA Tyler regarding medication management and anxiety. She was started on clonidine 0.1mg on 3/21/17 and increased to 0.2mg two weeks ago. She does not feel it is helpful for her anxiety.      REVIEW OF SYSTEMS:   She was recently started on Reglan for stomach issues. All other systems are negative.    PFSH:    TOBACCO USE:  History   Smoking Status     Current Every Day Smoker     Packs/day: 1.00     Years: 45.00   Smokeless Tobacco     Never Used     Comment: Green smoking cessation folder given       VITALS:  Vitals:    04/17/17 1205 04/17/17 1206 04/17/17 1510 04/17/17 1511   BP: 102/82 92/66 100/68 (!) 74/50   Patient Site: Left Arm Left Arm Right Arm Right Arm   Patient Position: Sitting Standing Lying Sitting   Cuff Size:       Pulse:       Weight:       Height:         Wt Readings from Last 3 Encounters:   04/17/17 114 lb 9.6 oz (52 kg)   01/09/17 113 lb 8 oz (51.5 kg)   12/01/16 110 lb 8 oz (50.1 kg)     Body mass index is 20.3 kg/(m^2).    PHYSICAL EXAM:  Constitutional:   Reveals an alert anxious-appearing slender woman who appears frail and weak. Resting tremor. Vitals: per nursing notes.  Pulse ox: 98% on room air.   Repeat BP by MD: 100/68, right arm, lying; 74/50, right arm, sitting; undetectable right and left while standing.   HEENT:  Atraumatic.   Neck:  No bruits or adenopathy.  Back:  Moderate thoracic kyphosis.   Lungs: Clear to A&P without rales or wheezes.  Respiratory effort normal.  Cardiac:   Regular rate and rhythm, normal S1, S2, no murmur.  Abdomen:  Tender to touch in epigastric area and right upper quadrant. No rebound, guarding, or mass.   Extremities:  No peripheral edema. Coolness to hands with moderate peripheral cyanosis.   Neuro:  Resting tremor; appears  anxious.     ADDITIONAL HISTORY SUMMARIZED (2): Reviewed notes and email encounters of YULISA Tyler, regarding clonidine use.  DECISION TO OBTAIN EXTRA INFORMATION (1): None.   RADIOLOGY TESTS (1): None.  LABS (1): Labs ordered.   MEDICINE TESTS (1): None.  INDEPENDENT REVIEW (2 each): None.     The visit lasted a total of 23 minutes face to face with the patient. Over 50% of the time was spent counseling and educating the patient about dizziness.    IRoldan, am scribing for and in the presence of, Dr. Jones.    I, Dr. Jones, personally performed the services described in this documentation, as scribed by Roldan Ovalle in my presence, and it is both accurate and complete.    Dragon dictation was used for this note.  Speech recognition errors are a possibility.    MEDICATIONS:  No current facility-administered medications for this visit.      Current Outpatient Prescriptions   Medication Sig Dispense Refill     acyclovir (ZOVIRAX) 5 % ointment Apply thin layer (1/2 gram)to affected area every 3 hours for breakouts. 15 g 2     albuterol (VENTOLIN HFA) 90 mcg/actuation inhaler INHALE 1 TO 2 PUFFS BY MOUTH EVERY 4 TO 6 HOURS AS NEEDED 18 g 5     cloNIDine HCl (CATAPRES) 0.2 MG tablet Take 1 tablet (0.2 mg total) by mouth 3 (three) times a day. 90 tablet 2     LORazepam (ATIVAN) 1 MG tablet Take 1 tablet (1 mg total) by mouth every 6 (six) hours as needed for anxiety. (to last 30 days) 100 tablet 0     metoclopramide (REGLAN) 5 MG tablet Take 1 tablet (5 mg total) by mouth 3 (three) times a day as needed for nausea. 90 tablet 3     metoprolol tartrate (LOPRESSOR) 100 MG tablet Take 1 tablet by mouth 2 (two) times a day.  3     omeprazole (PRILOSEC) 20 MG capsule TAKE ONE CAPSULE BY MOUTH TWICE DAILY 180 capsule 3     oxyCODONE-acetaminophen (PERCOCET) 7.5-325 mg per tablet Take 1 tablet by mouth every 4 (four) hours as needed for pain. 180 tablet 0     simvastatin (ZOCOR) 40 MG tablet TAKE 1 TABLET BY  MOUTH EVERY EVENING 90 tablet PRN     venlafaxine (EFFEXOR-XR) 150 MG 24 hr capsule Take 2 capsules (300 mg total) by mouth daily. 270 capsule 3     VITAMIN D2 50,000 unit capsule TAKE 1 CAPSULE BY MOUTH ONCE A WEEK 12 capsule 5     zolpidem (AMBIEN) 5 MG tablet Take 1 tablet (5 mg total) by mouth at bedtime as needed for sleep. 15 tablet 0     Facility-Administered Medications Ordered in Other Visits   Medication Dose Route Frequency Provider Last Rate Last Dose     sodium chloride 0.9 % flush 3 mL (NS)  3 mL Intravenous Line Care S. Christiano Sutherland, DO         sodium chloride 0.9% 1,000 mL  1,000 mL Intravenous Once S. Christiano Sutherland, DO           Total data points: 3

## 2021-06-10 NOTE — TELEPHONE ENCOUNTER
COPD education follow up call  Number was not in service previously. Now voice mail stated another name. Wrong number

## 2021-06-11 NOTE — PROGRESS NOTES
Behavioral Care Nurse Only Visit    6/28/2017  Last visit: 6-2-17           Chief Complaint   Patient presents with     Follow-up           Current Outpatient Prescriptions:      acyclovir (ZOVIRAX) 5 % ointment, Apply thin layer (1/2 gram)to affected area every 3 hours for breakouts., Disp: 15 g, Rfl: 2     albuterol (VENTOLIN HFA) 90 mcg/actuation inhaler, INHALE 1 TO 2 PUFFS BY MOUTH EVERY 4 TO 6 HOURS AS NEEDED, Disp: 18 g, Rfl: 5     hydrOXYzine (VISTARIL) 25 MG capsule, Take 1 capsule (25 mg total) by mouth 3 (three) times a day as needed for anxiety (use as directed)., Disp: 90 capsule, Rfl: 0     LORazepam (ATIVAN) 1 MG tablet, Take 1 tablet (1 mg total) by mouth every 6 (six) hours as needed for anxiety. (to last 30 days), Disp: 100 tablet, Rfl: 0     metoprolol tartrate (LOPRESSOR) 100 MG tablet, Take 1 tablet (100 mg total) by mouth 2 (two) times a day., Disp: 180 tablet, Rfl: 3     omeprazole (PRILOSEC) 20 MG capsule, TAKE ONE CAPSULE BY MOUTH TWICE DAILY, Disp: 180 capsule, Rfl: 2     oxyCODONE-acetaminophen (PERCOCET) 7.5-325 mg per tablet, Take 1 tablet by mouth every 4 (four) hours as needed for pain., Disp: 60 tablet, Rfl: 0     predniSONE (DELTASONE) 20 MG tablet, Take 2 tablets by mouth daily for 5 days, Disp: 10 tablet, Rfl: 0     prochlorperazine (COMPAZINE) 10 MG tablet, Take 1 tablet (10 mg total) by mouth every 6 (six) hours as needed for nausea., Disp: 30 tablet, Rfl: prn     simvastatin (ZOCOR) 40 MG tablet, TAKE 1 TABLET BY MOUTH EVERY EVENING, Disp: 90 tablet, Rfl: 0     venlafaxine (EFFEXOR-XR) 150 MG 24 hr capsule, Take 2 capsules (300 mg total) by mouth daily., Disp: 270 capsule, Rfl: 3     VITAMIN D2 50,000 unit capsule, TAKE 1 CAPSULE BY MOUTH ONCE A WEEK, Disp: 12 capsule, Rfl: 5     zolpidem (AMBIEN) 5 MG tablet, Take 1 tablet (5 mg total) by mouth at bedtime as needed for sleep., Disp: 15 tablet, Rfl: 5    Current Facility-Administered Medications:      denosumab 60 mg (PROLIA 60  mg/ml), 60 mg, Subcutaneous, Q6 Months, April D MD N[place], 60 mg at 11/07/16 1604    Stated taking medications as directed.  no If no, document variance on medication Reconciliation form.  Pregnant: no     SUBJECTIVE    Sleep disturbance: yes sleep ok with ambien    Irritability: no    Sad/depressed mood: no    Mood Swings/Manic Symptoms: no    Anxiety: yes in am, but better as day goes on    Panic attacks: no    Paranoia: no    Hallucinations: no    Delusions: no    Obsessive/Compulsive: no    Impaired concentration: no    Cognitive difficulties: no     Suicide thoughts/SIB: no    Homicide thoughts/aggession:no    Verbal/physical/property  Comments:NA    Change in interest: no    Hopeless/helpless/worthless: no    Change in energy: no    Change in libido: not evaluated    Alcohol use: no    Drug use: no    Appetite disturbance:no    Physical symptoms: no      There were no vitals filed for this visit.  If Pain, who will address pain? NA    Discuss: NA    Nursing Comments: patient is here for follow up visit.  She stopped the Remeron after being on it for a week because she said it did not work.  She denies SI or HI.  Sleep is about 4 hours a night with Ambien. Her PMD prescribes her Ambien.  She just got it filled on 6-19-17 with 5 refills on it.  Anxiety is worse in the morning, but gets better by late afternoon.  She said she has not had any panic attacks since quitting work.  She is taking vistaril TID and Ativan TID.  She said the ativan and vistaril do help her anxiety.  She said the ativan helps the most.  She said there are no new issues or concerns at this time.  She last saw Kali for therapy 4-30-17.    PER PATIENT:  What are the main concerns you would like addressed during your visit? Sleep and morning anxiety.  Her PMD manages her sleep according to patient.      Employment/Day program/School: no    Living Situation: Self    Ambulatory on their own: yes  Status: Ambulatory  Did patient need  assisting with getting weight? no       Vannessa De La Cruz     6/28/2017/12:56 PM

## 2021-06-12 NOTE — PROGRESS NOTES
"[facility] [place] [facility] Follow Up Note    Yaneth Holguin   67 y.o. female    Date of Visit: 8/31/2017    Chief Complaint   Patient presents with     Back Pain     Subjective  Yaneth comes in today to discuss her anxiety and chronic pain issues.  She has an appointment in October to see the pain clinic.  She continues to take a few Vicodin daily due to her hip and back pain.  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  She is mainly bothered by back pain if she is doing a lot of bending.  She retired earlier this year and this has helped with her anxiety issues but she is still being followed by a psychiatrist and is now on scheduled clonazepam in addition to her venlafaxine.  Sometimes she still has trouble sleeping and will use zolpidem at times.    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  She has 2 children and 4 grandchildren.       Medications were reconciled.  Allergies, social and family history, and the problem list were all reviewed and updated.      Exam  General Appearance: Pleasant and alert  Vitals:    08/31/17 0850   BP: 110/70   Pulse: 72   Resp: 10   Weight: 112 lb (50.8 kg)   Height: 5' 4\" (1.626 m)      Body mass index is 19.22 kg/(m^2).  Wt Readings from Last 3 Encounters:   08/31/17 112 lb (50.8 kg)   08/04/17 110 lb 6.4 oz (50.1 kg)   07/15/17 114 lb (51.7 kg)     HEENT: Sclera are clear.   Lungs: Normal respirations  Abdomen: Soft and nondistended  Extremities: No edema  Skin: No rashes  Neuro: Moves all extremities and has facial symmetry  Gait: Ambulates with a normal gait    Assessment/Plan  1. Osteoporosis  There are problems getting her Prolia injections, she is overdue for this and someone is to be checking on it.    2. " Generalized anxiety disorder  Continue to follow-up with psychiatry.  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    3. Chronic pain syndrome  Pain clinic for potential injections and blocks that may be helpful.  Hopefully can get off of narcotics due to her high benzodiazepine need for her anxiety disorder.    4. Essential hypertension with goal blood pressure less than 140/90  Blood pressure is stable.    5. Mixed hyperlipidemia  Remains on a statin.  - Lipid Cascade  - Hepatic Profile          April D MD N[place]  8/31/2017    Much or all of the text in this note was generated through the use of Dragon Dictate voice-to-text software. Errors in spelling or words which seem out of context are unintentional. Sound alike errors, in particular, may have escaped editing.

## 2021-06-12 NOTE — PROGRESS NOTES
Correct pharmacy verified with patient and confirmed in snapshot? [x] yes []no    Medications Phoned  to Pharmacy [] yes [x]no  Name of Pharmacist:  List Medications, including dose, quantity and instructions      Medication Prescriptions given to patient   [] yes  [x] no   List the name of the drug the prescription was written for.       Medications ordered this visit were e-scribed.  Verified by order class [x] yes  [] no    Medication changes or discontinuations were communicated to patient's pharmacy: [x] yes  [] no  Lorazepam.  Also asked the pharmacist to affirm with the patient with the new script for Clonazepam, that this is a one month supply, refills will not be dispensed early, that she must not overuse.    UA collected [] yes  [x] no    [place] Prescription Monitoring Program Reviewed? [x] yes  [] no    Referrals were made to:  Therapy at the spine center with Tali Winston or Peggy Arora.  She had been seeing Kali, did not want to follow with him.  Dr. Reid definitely wants her in therapy, and in agreement that it could be a female therapist at the pain clinic, who also has specialty in addiction issues.  [facility] said she would work on this with the Pain Clinic to get it scheduled.    Future appointment was made: [x] yes  [] no    Dictation completed at time of chart check: [x] yes  [] no    I have checked the documentation for today s encounters and the above information has been reviewed and completed.

## 2021-06-12 NOTE — PROGRESS NOTES
Patient here for follow up appointment.  She said she is depressed daily due to her daily high anxiety.  She denies SI or HI.  Sleep is 4-5 hours a night. She stopped taking the vistaril due to she said it didn't work.  She said her anxiety is worse since being off the ativan.      Correct pharmacy verified with patient and confirmed in snapshot? [x] yes []no    Charge captured ? [x] yes  [] no    Medications Phoned  to Pharmacy [] yes [x]no  Name of Pharmacist:  List Medications, including dose, quantity and instructions      Medication Prescriptions given to patient   [] yes  [x] no   List the name of the drug the prescription was written for.       Medications ordered this visit were e-scribed.  Verified by order class [x] yes  [] no    Medication changes or discontinuations were communicated to patient's pharmacy: [x] yes  [] no  Klonopin decrease not to be dispensed until 9-29-17    UA collected [] yes  [x] no    [place] Prescription Monitoring Program Reviewed? [x] yes  [] no    Referrals were made to:  NA    Future appointment was made: [x] yes  [] no    Dictation completed at time of chart check: [x] yes  [] no    I have checked the documentation for today s encounters and the above information has been reviewed and completed.
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
## 2021-06-13 NOTE — PROGRESS NOTES
Correct pharmacy verified with patient and confirmed in snapshot? [x] yes []no    Charge captured ? [x] yes  [] no    Medications Phoned  to Pharmacy [] yes [x]no  Name of Pharmacist:  List Medications, including dose, quantity and instructions      Medication Prescriptions given to patient   [] yes  [x] no   List the name of the drug the prescription was written for.       Medications ordered this visit were e-scribed.  Verified by order class [x] yes  [] no  Klonopin 0.5mg and Risperdal 0.5mg     Medication changes or discontinuations were communicated to patient's pharmacy: [] yes  [x] no    UA collected [] yes  [x] no    [place] Prescription Monitoring Program Reviewed? [] yes  [x] no    Referrals were made to:  none    Future appointment was made: [] yes  [x] no    Dictation completed at time of chart check: [x] yes  [] no    I have checked the documentation for today s encounters and the above information has been reviewed and completed.
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
## 2021-06-14 NOTE — PROGRESS NOTES
[facility] Follow Up Note    Yaneth Holguin   67 y.o. female    Date of Visit: 11/21/2017    Chief Complaint   Patient presents with     Leg Swelling     bilateral leg swelling.     Subjective  Yaneth comes in today as she had noted some swelling around her ankles.  It seems better today.  She remains in a cervical collar but it has getting a CAT scan today and is hoping to get out of the collar soon.  She continues to taper down her benzodiazepine use.  She is tolerating alendronate as we were unable to get her onto Prolia due to cost issues.  She admits she is smoking cigarettes daily.    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  She has 2 children and 4 grandchildren.       Medications were reconciled.  Allergies, social and family history, and the problem list were all reviewed and updated.      Exam  General Appearance: Pleasant and alert  Vitals:    11/21/17 0912   BP: 108/68   Patient Site: Left Arm   Patient Position: Sitting   Cuff Size: Adult Regular   Pulse: 69   Weight: 113 lb 8 oz (51.5 kg)      Body mass index is 19.48 kg/(m^2).  Wt Readings from Last 3 Encounters:   11/21/17 113 lb 8 oz (51.5 kg)   11/03/17 109 lb 3.2 oz (49.5 kg)   11/01/17 112 lb (50.8 kg)     HEENT: Sclera are clear.   Lungs: Normal respirations  Abdomen: Soft and nondistended  Extremities: Trace pedal edema  Skin: No rashes  Neuro: Moves all extremities and has facial symmetry  Gait: Ambulates with a normal gait    Assessment/Plan  1. Osteoporosis with current pathological fracture with delayed healing, unspecified osteoporosis type, subsequent encounter  Continue with alendronate and vitamin D.  Check a bone density scan.  - DXA Bone Density Scan; Future    2. Vitamin D deficiency  - ergocalciferol (VITAMIN D2) 50,000 unit capsule; TAKE  1 CAPSULE BY MOUTH ONCE A WEEK  Dispense: 12 capsule; Refill: 5    3. Closed nondisplaced fracture of second cervical vertebra, unspecified fracture morphology, initial encounter  She continues to follow-up with spine surgery.    4. Generalized anxiety disorder  She continues to work with psychiatry and psychology and is tapering off of benzodiazepines.          Hannah Arrington MD  11/21/2017    Much or all of the text in this note was generated through the use of Dragon Dictate voice-to-text software. Errors in spelling or words which seem out of context are unintentional. Sound alike errors, in particular, may have escaped editing.
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
## 2021-06-16 PROBLEM — N39.0 URINARY TRACT INFECTION: Status: ACTIVE | Noted: 2019-01-01

## 2021-06-16 PROBLEM — Z76.5 DRUG-SEEKING BEHAVIOR: Chronic | Status: ACTIVE | Noted: 2017-10-12

## 2021-06-16 PROBLEM — T50.901A DRUG OVERDOSE: Status: ACTIVE | Noted: 2018-02-15

## 2021-06-16 PROBLEM — S42.291G: Status: ACTIVE | Noted: 2019-01-03

## 2021-06-16 PROBLEM — S12.100A C2 CERVICAL FRACTURE (H): Status: ACTIVE | Noted: 2017-10-13

## 2021-06-16 PROBLEM — F32.0 MILD MAJOR DEPRESSION (H): Status: ACTIVE | Noted: 2019-01-01

## 2021-06-16 PROBLEM — S42.201G: Status: ACTIVE | Noted: 2019-01-03

## 2021-06-16 PROBLEM — F41.9 ANXIETY: Status: ACTIVE | Noted: 2020-01-01

## 2021-06-16 PROBLEM — E87.5 HYPERKALEMIA: Status: ACTIVE | Noted: 2019-01-04

## 2021-06-16 PROBLEM — J44.1 COPD EXACERBATION (H): Status: ACTIVE | Noted: 2019-01-01

## 2021-06-16 PROBLEM — R09.02 HYPOXIA: Status: ACTIVE | Noted: 2019-01-01

## 2021-06-16 PROBLEM — G47.00 INSOMNIA: Status: ACTIVE | Noted: 2017-10-17

## 2021-06-16 PROBLEM — D50.9 MICROCYTIC ANEMIA: Status: ACTIVE | Noted: 2020-01-01

## 2021-06-17 NOTE — PROGRESS NOTES
ASSESSMENT:     Diagnoses and all orders for this visit:    Thoracic spine pain  -     MR Thoracic Spine Without Contrast; Future; Expected date: 4/25/18    Myofascial pain  -     MR Thoracic Spine Without Contrast; Future; Expected date: 4/25/18    Hx of compression fracture of spine  -     MR Thoracic Spine Without Contrast; Future; Expected date: 4/25/18    Osteopenia  -     MR Thoracic Spine Without Contrast; Future; Expected date: 4/25/18        Yaneth Holguin is a 68 y.o. female  with a BMI of Body mass index is 19.33 kg/(m^2). seen in consultation at the request of Hannah Bhakta MD, with past medical history significant for hypertension, osteoporosis, hyperlipidemia, and anxiety, insomnia, who presents today for new patient evaluation of chronic thoracic pain.  Patient with history of osteoporosis.  Differential diagnosis compression fracture of the thoracic spine, disc herniation of the thoracic spine, myofascial pain, facet arthropathy.  No red flags.      NDI:  16  WHO-5:  0    PLAN:  A shared decision making model was used.  The patient's values and choices were respected.  The following represents what was discussed and decided upon by the physician and the patient.      1.  DIAGNOSTIC TESTS: Thoracic x-rays that were done back in December 2017 showed age-indeterminate compression fracture with approximately 30% height loss.  We will obtain a thoracic MRI to further evaluate for acute thoracic compression fracture, disc herniation, facet arthropathy.  2.  PHYSICAL THERAPY:  Discussed the importance of core strengthening, ROM, stretching exercises with the patient and how each of these entities is important in decreasing pain.  Explained to the patient that the purpose of physical therapy is to teach the patient a home exercise program.  These exercises need to be performed every day in order to decrease pain and prevent future occurrences of pain.  Likened it to brushing one's teeth.   Patient will benefit from    physical therapy program.  3.  MEDICATIONS: Patient will continue on ibuprofen 800 mg every 6 hours with food as needed for pain.  I recommend patient to take Tylenol 500 mg up to 4 tablets daily with ibuprofen.  I recommend patient to restart on gabapentin 300 mg 1 tablet 3 times a day, with gradual increase of 1 tablet every 3 days as provided chart.  Side effects of the medication discussed with the patient today.  4.  INTERVENTIONS: No therapeutic steroid injection at this time.  5.  PATIENT EDUCATION: I thoroughly discussed the plan with the patient today.  She verbalizes understanding and agrees with the plan.     FOLLOW-UP:   Patient will follow up with me in 2 weeks.  All questions were answered.      TIFFANY Adrian, MPA-C          SUBJECTIVE:  Yaneth Holguin  Is a 68 y.o. female who presents today for new patient evaluation of thoracic pain.  Patient reports history of several years of bilateral thoracic pain.  She reports pain at the T4-T6 level bilaterally.  Patient stated that she feel that pain radiates to the sides.  Patient has history of osteoporosis.  She has history of compression fracture at the T4 vertebrae.  She had vertebroplasty of the fourth thoracic vertebra back in December 2015.  She has history of cervical fracture at the C2 that healed.  She denies neck pain, or radicular pain to the upper extremity.    At this time she reports 5 out of 10 intensity pain in the thoracic region.  Her symptoms are aggravated by bending over and rates it at 8 out of 10 intensity on its worse.  Her symptoms are mildly alleviated by taking ibuprofen 800 mg every 6 hours.  Patient stated that taking the ibuprofen does not help with her symptoms.  Patient stated that she tried taking gabapentin 300 mg without significant pain relief.  She had thoracic x-rays back in December 26, 2017 that showed age-indeterminate compression fracture, with approximately 30% height loss that  are new since December 11 of 2015.  Patient denies new trauma to her neck or to the thoracic or lumbar region.  She has significant thoracic kyphosis.  She denies recent physical therapy.Patient denies urinary or bowel incontinence, unintentional weight loss, saddle anesthesia, fever or chills, balance difficulties.      Medications:    Current Outpatient Prescriptions   Medication Sig Dispense Refill     albuterol (VENTOLIN HFA) 90 mcg/actuation inhaler INHALE 1 TO 2 PUFFS BY MOUTH EVERY 4 TO 6 HOURS AS NEEDED 18 g 5     alendronate (FOSAMAX) 70 MG tablet Take 1 tablet (70 mg total) by mouth every 7 days. Take once a week on and empty stomach. 13 tablet prn     baclofen (LIORESAL) 10 MG tablet Take 1 tablet (10 mg total) by mouth 4 (four) times a day as needed (muscle spasm). 120 tablet 3     docosanol (ABREVA) 10 % Crea Apply as needed to cold sore 2 g 2     ergocalciferol (VITAMIN D2) 50,000 unit capsule TAKE 1 CAPSULE BY MOUTH ONCE A WEEK 12 capsule 5     gabapentin (NEURONTIN) 300 MG capsule        hydrOXYzine pamoate (VISTARIL) 25 MG capsule TAKE 2 CAPSULES BY MOUTH THREE TIMES DAILY AS NEEDED FOR ANXIETY (USE  AS  DIRECTED) 180 capsule 0     ibuprofen (ADVIL,MOTRIN) 800 MG tablet Take 1 tablet (800 mg total) by mouth every 6 (six) hours as needed for pain. 90 tablet 0     metoprolol tartrate (LOPRESSOR) 100 MG tablet Take 1 tablet (100 mg total) by mouth 2 (two) times a day. 180 tablet 3     mirtazapine (REMERON SOL-TAB) 15 MG disintegrating tablet Take 1 tablet (15 mg total) by mouth at bedtime. 30 tablet prn     omeprazole (PRILOSEC) 20 MG capsule Take 1 capsule (20 mg total) by mouth 2 (two) times a day before meals. 180 capsule prn     prochlorperazine (COMPAZINE) 10 MG tablet Take 10 mg by mouth every 6 (six) hours as needed for nausea.        QUEtiapine (SEROQUEL) 100 MG tablet Take 1-2 tablets (100-200 mg total) by mouth at bedtime as needed, may repeat once. 60 tablet 5     risperiDONE (RISPERDAL) 0.5  MG tablet        risperiDONE (RISPERDAL) 1 MG tablet        spironolactone-hydrochlorothiazide (ALDACTAZIDE) 25-25 mg tablet Take 1 tablet by mouth daily as needed (swelling). 90 tablet 3     thiamine 100 MG tablet Take 1 tablet (100 mg total) by mouth daily.  0     tiotropium (SPIRIVA WITH HANDIHALER) 18 mcg inhalation capsule Place 1 capsule (2 puffs total) into inhaler and inhale daily. 30 capsule 2     tiotropium bromide (SPIRIVA RESPIMAT) 2.5 mcg/actuation Mist Inhale 2 puffs daily. 4 g 6     venlafaxine (EFFEXOR-XR) 150 MG 24 hr capsule Take 2 capsules (300 mg total) by mouth daily. 180 capsule prn     No current facility-administered medications for this encounter.          Allergies:   Allergies   Allergen Reactions     Niacin Preparations Itching     Penicillins Itching     Pt states itching and welts as reaction.     Nitrofurantoin Monohyd/M-Cryst Other (See Comments)     Reaction unknown     Adhesive Tape-Silicones Hives         PMH:    Past Medical History:   Diagnosis Date     Anxiety      Distal radius fracture 05/30/2015    Left Distal (ace wrapped on admission). Fell when climbing stairs     Endometriosis      Fall 05/30/2015     Gastritis      GERD (gastroesophageal reflux disease)      History of kidney stones      Hyperlipidemia      Hypertension      Insomnia      Left elbow fracture 02/2015     Osteoporosis          PSH:    Past Surgical History:   Procedure Laterality Date     OOPHORECTOMY       MN EVACUATE MOLE OF UTERUS      Description: Dilation And Curettage For Hydatidiform Mole;  Recorded: 08/06/2008;     MN REMOVE TONSILS/ADENOIDS,<13 Y/O      Description: Tonsillectomy With Adenoidectomy;  Recorded: 08/06/2008;     MN TOTAL ABDOM HYSTERECTOMY  1986    Description: Total Abdominal Hysterectomy;  Recorded: 08/30/2010;     MN TOTAL HIP ARTHROPLASTY Bilateral     Description: Total Hip Replacement;  Recorded: 09/08/2008;  Comments: for AVN; Lt. 5/07 and had to be redone because of Fx; Rt.  done 8/08     VERTEBROPLASTY  12/2015    T4     WRIST FRACTURE SURGERY Left 5/2015         Family History:  Fibromyalgia, brother with lung cancer.      Social History: Smokes half a pack a day.  No alcohol or illicit drug use.    ROS: Thoracic pain.  Specifically negative for dysphagia, imbalance, fine motor skill difficulties, bowel/bladder dysfunction, fevers,chills, appetite changes, unexplained weight loss.   Otherwise 13 systems reviewed are negative.  Please see the patient's intake questionnaire from today for details.      OBJECTIVE:  PHYSICAL EXAMINATION:  CONSTITUTIONAL:  Vital signs as above.  No acute distress.  The patient is well nourished and well groomed.  PSYCHIATRIC:  The patient is awake, alert, oriented to person, place, time and answering questions appropriately with clear speech.    HEENT:  Sclera are non-injected.  Extraocular muscles are intact. .  Moist oral mucosa.  SKIN:  Skin over the face, bilateral upper extremities, and posterior torso is clean, dry, intact without rashes.  LYMPH NODES:  No palpable or tender anterior/posterior cervical, submandibular, or supraclavicular lymph nodes.    MUSCLE STRENGTH:  5/5 strength for the bilateral shoulder abductors, elbow flexors/extensors, wrist extensors, finger flexors/abductors.  NEURO:  CN III-XII are grossly intact.  2/4 symmetric biceps, brachioradialis, triceps reflexes bilaterally.  Sensation to light touch intact.  Negative Strauss's bilaterally.    VASCULAR:  2/4 radial pulses bilaterally.  Warm upper limbs bilaterally.  Capillary refill in the upper extremities is less than 1 second.  MUSCULOSKELETAL: Patient has tenderness at the T4, T5 midline.  Patient has thoracic kyphosis.    RESULTS:      XR THORACIC SPINE 3 VWS   12/26/2017 4:38 PM     INDICATION: back pain, fall  COMPARISON: MRI thoracic spine 12/11/2015     FINDINGS: The cervicothoracic junction is not well evaluated secondary to overlapping structures and diffuse osteopenia.  Within this constraint, there is vertebroplasty of one of the upper thoracic vertebral bodies, presumably T4. There are additional   age-indeterminate compression fractures of at least 2 other midthoracic vertebral bodies with approximately 30% height loss, new from 12/11/15.

## 2021-06-17 NOTE — PATIENT INSTRUCTIONS - HE
Patient Instructions by Li Moore CNP at 12/27/2019  7:10 AM     Author: Li Moore CNP Service: -- Author Type: Nurse Practitioner    Filed: 12/27/2019  8:50 AM Encounter Date: 12/27/2019 Status: Signed    : Li Moore CNP (Nurse Practitioner)         Patient Education     COPD Flare    You have had a flare-up of your COPD.  COPD, or chronic obstructive pulmonary disease, is a common lung disease. It causes your airways to become irritated and narrower. This makes it harder for you to breathe. Emphysema and chronic bronchitis are both types of COPD. This is a chronic condition, which means you always have it. Sometimes it gets worse. When this happens, it is called a flare-up.  Symptoms of COPD  People with COPD may have symptoms most of the time. In a flare-up, your symptoms get worse. These symptoms may mean you are having a flare-up:    Shortness of breath, shallow or rapid breathing, or wheezing that gets worse    Lung infection    Cough that gets worse    More mucus, thicker mucus or mucus of a different color    Tiredness, decreased energy, or trouble doing your usual activities    Fever    Chest tightness    Your symptoms dont get better even when you use your usual medicines, inhalers, and nebulizer    Trouble talking    You feel confused  Causes of flare-ups  Unfortunately, a flare-up can happen even though you did everything right, and you followed your doctors instructions. Some causes of flare-ups are:    Smoking or secondhand smoke    Colds, the flu, or respiratory infections    Air pollution    Sudden change in the weather    Dust, irritating chemicals, or strong fumes    Not taking your medicines as prescribed  Home care  Here are some things you can do at home to treat a flare-up:    Try not to panic. This makes it harder to breathe, and keeps you from doing the right things.    Dont smoke or be around others who are smoking.    Try to drink more fluids than usual  during a flare-up, unless your doctor has told you not to because of heart and kidney problems. More fluids can help loosen the mucus.    Use your inhalers and nebulizer, if you have one, as you have been told to.    If you were given antibiotics, take them until they are used up or your doctor tells you to stop. Its important to finish the antibiotics, even though you feel better. This will make sure the infection has cleared.    If you were given prednisone or another steroid, finish it even if you feel better.  Preventing a flare-up  Even though flare-ups happen, the best way to treat one is to prevent it before it starts. Here are some pointers:    Dont smoke or be around others who are smoking.    Take your medicines as you have been told.    Talk with your doctor about getting a flu shot every year. Also find out if you need a pneumonia shot.    If there is a weather advisory warning to stay indoors, try to stay inside when possible.    Try to eat healthy and get plenty of sleep.    Try to avoid things that usually set you off, like dust, chemical fumes, hairsprays, or strong perfumes.  Follow-up care  Follow up with your healthcare provider, or as advised.  If a culture was done, you will be told if your treatment needs to be changed. You can call as directed for the results.  If X-rays were done, you will be notified of any new findings that may affect your care.  Call 911  Call 911 if any of these occur:    You have trouble breathing    You feel confused or its difficult to wake you up    You faint or lose consciousness    You have a rapid heart rate    You have new pain in your chest, arm, shoulder, neck or upper back  When to seek medical advice  Call your healthcare provider right away if any of these occur:    Wheezing or shortness of breath gets worse    You need to use your inhalers more often than usual without relief    Fever of 100.4 F (38 C) or higher, or as directed by your healthcare  provider    Coughing up lots of dark-colored or bloody mucus (sputum)    Chest pain with each breath    You do not start to get better within 24 hours    Swelling of your ankles gets worse    Dizziness or weakness  Date Last Reviewed: 9/1/2016 2000-2017 [employer]. [address] [place] [id number]. All rights reserved. This information is not intended as a substitute for professional medical care. Always follow your healthcare professional's instructions.

## 2021-06-17 NOTE — PATIENT INSTRUCTIONS - HE
Patient Instructions by Kina Stanley MD at 9/21/2019  8:10 AM     Author: Kina Stanley MD Service: -- Author Type: Physician    Filed: 9/21/2019  9:03 AM Encounter Date: 9/21/2019 Status: Addendum    : Kina Stanley MD (Physician)    Related Notes: Original Note by Kina Stanley MD (Physician) filed at 9/21/2019  9:03 AM       1. Keep well hydrated  2. May alternate Tylenol every 6 hours with ibuprofen every 6 hours as needed for fever or pain  3. Keep your follow up appointment  4. If you feel you are not getting better after 48 hours of medication or If you have any questions, call the clinic number  - it's answered 24/7  5. If you start having chest pain or worse breathing, call 911                Patient Education     COPD Flare    You have had a flare-up of your COPD.  COPD, or chronic obstructive pulmonary disease, is a common lung disease. It causes your airways to become irritated and narrower. This makes it harder for you to breathe. Emphysema and chronic bronchitis are both types of COPD. This is a chronic condition, which means you always have it. Sometimes it gets worse. When this happens, it is called a flare-up.  Symptoms of COPD  People with COPD may have symptoms most of the time. In a flare-up, your symptoms get worse. These symptoms may mean you are having a flare-up:    Shortness of breath, shallow or rapid breathing, or wheezing that gets worse    Lung infection    Cough that gets worse    More mucus, thicker mucus or mucus of a different color    Tiredness, decreased energy, or trouble doing your usual activities    Fever    Chest tightness    Your symptoms dont get better even when you use your usual medicines, inhalers, and nebulizer    Trouble talking    You feel confused  Causes of flare-ups  Unfortunately, a flare-up can happen even though you did everything right, and you followed your doctors instructions. Some causes of flare-ups are:    Smoking or secondhand  smoke    Colds, the flu, or respiratory infections    Air pollution    Sudden change in the weather    Dust, irritating chemicals, or strong fumes    Not taking your medicines as prescribed  Home care  Here are some things you can do at home to treat a flare-up:    Try not to panic. This makes it harder to breathe, and keeps you from doing the right things.    Dont smoke or be around others who are smoking.    Try to drink more fluids than usual during a flare-up, unless your doctor has told you not to because of heart and kidney problems. More fluids can help loosen the mucus.    Use your inhalers and nebulizer, if you have one, as you have been told to.    If you were given antibiotics, take them until they are used up or your doctor tells you to stop. Its important to finish the antibiotics, even though you feel better. This will make sure the infection has cleared.    If you were given prednisone or another steroid, finish it even if you feel better.  Preventing a flare-up  Even though flare-ups happen, the best way to treat one is to prevent it before it starts. Here are some pointers:    Dont smoke or be around others who are smoking.    Take your medicines as you have been told.    Talk with your doctor about getting a flu shot every year. Also find out if you need a pneumonia shot.    If there is a weather advisory warning to stay indoors, try to stay inside when possible.    Try to eat healthy and get plenty of sleep.    Try to avoid things that usually set you off, like dust, chemical fumes, hairsprays, or strong perfumes.  Follow-up care  Follow up with your healthcare provider, or as advised.  If a culture was done, you will be told if your treatment needs to be changed. You can call as directed for the results.  If X-rays were done, you will be notified of any new findings that may affect your care.  Call 911  Call 911 if any of these occur:    You have trouble breathing    You feel confused or its  difficult to wake you up    You faint or lose consciousness    You have a rapid heart rate    You have new pain in your chest, arm, shoulder, neck or upper back  When to seek medical advice  Call your healthcare provider right away if any of these occur:    Wheezing or shortness of breath gets worse    You need to use your inhalers more often than usual without relief    Fever of 100.4 F (38 C) or higher, or as directed by your healthcare provider    Coughing up lots of dark-colored or bloody mucus (sputum)    Chest pain with each breath    You do not start to get better within 24 hours    Swelling of your ankles gets worse    Dizziness or weakness  Date Last Reviewed: 9/1/2016 2000-2017 [employer]. [address] [place] [address], [place] [id number]. All rights reserved. This information is not intended as a substitute for professional medical care. Always follow your healthcare professional's instructions.

## 2021-06-17 NOTE — PATIENT INSTRUCTIONS - HE
"Patient Instructions by Paola Soria PA-C at 4/25/2019 11:15 AM     Author: Paola Soria PA-C Service: -- Author Type: Physician Assistant    Filed: 4/25/2019 12:09 PM Encounter Date: 4/25/2019 Status: Signed    : Paola Soria PA-C (Physician Assistant)         Patient Education     AVM: Preparing for Surgery  As you prepare for AVM surgery, you may be told to do some of the following:    You may be asked to donate blood for your surgery. You may also be told to stop taking aspirin and other medicines that thin the blood. If you smoke, you should stop now.    Shortly before the day of your surgery, you will have a medical exam. This ensures that you are healthy enough for the procedure.    Follow any directions you are given for taking medicines and for not eating or drinking before surgery.    The day of your surgery  Be sure to arrive at the hospital on time. You may feel a bit nervous. Your healthcare team will try to answer all your questions and put you at ease. Just before surgery, you will be given general anesthesia to help you \"sleep\" through the surgery. At some point, an IV (intravenous) line will be placed in your arm. This can supply medicine and fluids as needed. In some cases, part of your head will be shaved. This is done to decrease the risk of infection.  Risks of surgery  As with any surgery, surgery to treat AVM has certain risks. These include:    Seizure    Infection    Loss of memory; confusion    Swelling or bleeding in the brain    Blood clots    Loss of sensation, including vision    Weakness or paralysis    Inability to speak    Death  Date Last Reviewed: 10/8/2015    3996-1658 [employer]. [address] [place] [id number]. All rights reserved. This information is not intended as a substitute for professional medical care. Always follow your healthcare professional's instructions.                "

## 2021-06-18 NOTE — LETTER
Letter by Johnson, Michael Duane, CNP at      Author: Johnson, Michael Duane, CNP Service: -- Author Type: --    Filed:  Encounter Date: 1/17/2019 Status: (Other)         Patient: Yaneth Holguin   MR Number: [id number]   YOB: 1950   Date of Visit: 1/17/2019     [facility]    Facility:   [facility] [[id number]]   Code Status: FULL CODE      CHIEF COMPLAINT/REASON FOR VISIT:  Chief Complaint   Patient presents with   ? Follow [facility]     [facility]       HISTORY:      HPI: Yaneth is a 68 y.o. female who I had the pleasure to revisit with secondary to her hospitalization January 30 January 20, 2019 secondary to a closed fracture of the proximal right humerus status post ORIF.  She currently seems to be doing fine good CMS to her right hand.  Does have a sling and a splint.  Is performing in therapy.  She does see orthopedics on the 23rd.  For pain she can have oxycodone as needed usually does take them pretty frequently throughout the day of these 4-5 doses she also can have Tylenol as needed.  She has been normotensive and afebrile.  For sleep she is on Risperdal along with Seroquel and also does have Lorazepam as needed and she usually does take her 2 tabs at night.  The moods have been stable.  Appetite good no bowel or bladder problems getting extra nutrient supplements.  We also did a chance to talk about her wonderful bouquet of flowers in her room that is starting to lose its color fullness.    Past Medical History:   Diagnosis Date   ? Anxiety    ? Distal radius fracture 05/30/2015    Left Distal (ace wrapped on admission). Fell when climbing stairs   ? Endometriosis    ? Fall 05/30/2015   ? Gastritis    ? GERD (gastroesophageal reflux disease)    ? History of kidney stones    ? Hyperlipidemia    ? Hypertension    ? Insomnia    ? Left elbow fracture 02/2015   ? Osteoporosis              Family History   Problem Relation Age of Onset   ? COPD Mother    ?  Lung cancer Brother    ? COPD Father          age 84   ? Hypertension Sister    ? Breast cancer Cousin      Social History     Socioeconomic History   ? Marital status:      Spouse name: Not on file   ? Number of children: Not on file   ? Years of education: Not on file   ? Highest education level: Not on file   Social Needs   ? Financial resource strain: Not on file   ? Food insecurity - worry: Not on file   ? Food insecurity - inability: Not on file   ? Transportation needs - medical: Not on file   ? Transportation needs - non-medical: Not on file   Occupational History   ? Not on file   Tobacco Use   ? Smoking status: Current Every Day Smoker     Packs/day: 0.50     Years: 45.00     Pack years: 22.50     Types: Cigarettes     Start date: 1970   ? Smokeless tobacco: Current User   ? Tobacco comment: Green smoking cessation folder given   Substance and Sexual Activity   ? Alcohol use: No   ? Drug use: No   ? Sexual activity: Not Currently   Other Topics Concern   ? 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  She has 2 children and 4 grandchildren.         Review of Systems  Denies chills and fever coughing wheezing chest pain dizziness or vertigo nausea vomiting diarrhea dysuria flulike symptoms headache stiff neck swollen glands rashes or sores.  History of COPD anxiety hypertension hyperlipidemia recent closed humeral fracture status post ORIF       Current Outpatient Medications   Medication Sig   ? acetaminophen (TYLENOL) 325 MG tablet Take 2 tablets (650 mg total) by mouth daily as needed.   ? acetaminophen (TYLENOL) 500 MG tablet Take 2 tablets (1,000 mg total) by mouth 3 (three) times a day.   ? albuterol (VENTOLIN HFA) 90 mcg/actuation inhaler INHALE 1 TO 2 PUFFS BY MOUTH EVERY 4 TO 6 HOURS AS NEEDED   ? alendronate (FOSAMAX) 70 MG tablet Take 70 mg by mouth every 7 days. On Thursday. Take in the morning on an empty  stomach with a full glass of water 30 minutes before food   ? aspirin 325 MG tablet Take 1 tablet (325 mg total) by mouth daily with breakfast.   ? bisacodyl (DULCOLAX) 10 mg suppository One supp LA qday pRN constipation (no BM x preceding 48 hrs)   ? budesonide-formoterol (SYMBICORT) 160-4.5 mcg/actuation inhaler Inhale 2 puffs 2 (two) times a day.   ? docosanol (ABREVA) 10 % Crea Apply 1 application topically 5 x daily PRN (for cold sores).   ? docusate sodium (COLACE) 100 MG capsule Take 1 capsule (100 mg total) by mouth 2 (two) times a day.   ? ergocalciferol (VITAMIN D2) 50,000 unit capsule TAKE 1 CAPSULE BY MOUTH ONCE A WEEK (Patient taking differently: Take 50,000 Units by mouth once a week. On Thursday      )   ? gabapentin (NEURONTIN) 100 MG capsule Take 100 mg by mouth at bedtime.   ? LORazepam (ATIVAN) 0.5 MG tablet Take 1-2 tablets (0.5-1 mg total) by mouth at bedtime as needed.   ? metoprolol tartrate (LOPRESSOR) 100 MG tablet Take 1 tablet (100 mg total) by mouth 2 (two) times a day.   ? nicotine (NICODERM CQ) 14 mg/24 hr Place 1 patch on the skin daily.   ? omeprazole (PRILOSEC) 20 MG capsule Take 1 capsule (20 mg total) by mouth 2 (two) times a day before meals.   ? oxyCODONE (ROXICODONE) 5 MG immediate release tablet Take 1-2 tablets (5-10 mg total) by mouth every 3 (three) hours as needed.   ? polyethylene glycol (MIRALAX) 17 gram packet Take 1 packet (17 g total) by mouth daily.   ? QUEtiapine (SEROQUEL) 100 MG tablet Take 2 tablets (200 mg total) by mouth at bedtime.   ? risperiDONE (RISPERDAL) 1 MG tablet Take 1 tablet (1 mg total) by mouth 3 (three) times a day.   ? senna-docusate (PERICOLACE) 8.6-50 mg tablet Take 1 tablet by mouth 2 (two) times a day.   ? venlafaxine (EFFEXOR-XR) 150 MG 24 hr capsule Take 2 capsules (300 mg total) by mouth daily.       .There were no vitals filed for this visit.  Blood pressure 133/62 pulse 80 temperature 97.5 saturation room air 92%  Physical Exam     Constitutional: She is oriented to person, place, and time. No distress.   HENT:   Neck: Neck supple. No thyromegaly present.   Cardiovascular: Normal rate, regular rhythm and normal heart sounds.   Pulmonary/Chest: Breath sounds normal.   Musculoskeletal:   Recent closed humeral fracture status post ORIF.  Hydrocolloid dressing the right shoulder.  Sling and splint.  Good CMS to her right hand.  Pain is managed.   Neurological: She is alert and oriented to person, place, and time.   Skin: Skin is warm and dry. No rash noted.   Psychiatric: Her behavior is normal.     LABS:   Lab Results   Component Value Date    WBC 10.8 01/05/2019    HGB 8.9 (L) 01/14/2019    HCT 28.4 (L) 01/05/2019    MCV 92 01/05/2019     01/05/2019     Results for orders placed or performed in visit on 01/14/19   Basic Metabolic Panel   Result Value Ref Range    Sodium 142 136 - 145 mmol/L    Potassium 3.8 3.5 - 5.0 mmol/L    Chloride 111 (H) 98 - 107 mmol/L    CO2 22 22 - 31 mmol/L    Anion Gap, Calculation 9 5 - 18 mmol/L    Glucose 79 70 - 125 mg/dL    Calcium 8.7 8.5 - 10.5 mg/dL    BUN 12 8 - 22 mg/dL    Creatinine 0.68 0.60 - 1.10 mg/dL    GFR MDRD Af Amer >60 >60 mL/min/1.73m2    GFR MDRD Non Af Amer >60 >60 mL/min/1.73m2           ASSESSMENT:      ICD-10-CM    1. Closed fracture of proximal end of right humerus with delayed healing, unspecified fracture morphology, subsequent encounter S42.201G    2. Herpes Simplex B00.9    3. Hypertension I10    4. Mood disorder due to a general medical condition F06.30        PLAN:    At this time continue to manage and follow.  See her laboratory studies as listed above.  Her pain seems to be well managed.  Does see orthopedics on January 23.      Electronically signed by: Michael Duane Johnson, ALTAF

## 2021-06-18 NOTE — LETTER
Letter by Johnson, Michael Duane, CNP at      Author: Johnson, Michael Duane, CNP Service: -- Author Type: --    Filed:  Encounter Date: 1/21/2019 Status: (Other)         Patient: Yaneth Holguin   MR Number: [id number]   YOB: 1950   Date of Visit: 1/21/2019     [facility]    Facility:   [facility] [[id number]]   Code Status: FULL CODE  PCP: Paola Soria PA-C   Phone: [phone]   Fax: [phone]      CHIEF COMPLAINT/REASON FOR VISIT:  Chief Complaint   Patient presents with   ? Discharge Summary       HISTORY COURSE:  Yaneth is a 68 y.o. female who I had the pleasure to visit with secondary to her most recent hospitalization January 3 - January 8, 2019 secondary to a fall sustaining a closed fracture of the proximal end of the right wrist with delayed healing status post ORIF.  Workup did include a right shoulder x-ray which did show a mildly displaced fracture of the surgical neck of the proximal right humerus with anterior displacement of the shaft in the last and the humeral head is rotated posteriorly but does maintain contact with the glenoid.  The initial laboratory studies did show sodium of 134 potassium 6.3 BUN 38 creatinine 1.80 her initial white cells were 19 hemoglobin 13.5 hemoglobin eventually did drop down on January 7 8.1.  Regarding her right proximal humeral fracture with anterior displacement did have a ORIF he also did have acute renal failure which required IV fluids she also did have hyperkalemia which did resolve and the most current potassium on January 7 3.7.  She did have traumatic rhabdomyolysis the creatinine level was at 807 and did come down to 247 she also did have lymphocytosis secondary to a fall.  She did have acute blood loss anemia hemoglobin did drop we will recheck her hemoglobin on the transitional care unit.  She is a smoker.  Also has anxiety disorder he does take Risperdal and Seroquel also does take  Lorazepam for sleep.  She also had hypoglycemia secondary to poor p.o. intake.  Also moderate protein calorie malnutrition she did consult with dietitian and also continue to evaluate her blood pressures.  She has been participating in the rehabilitation services and actually has made pretty good progress her strength and balance has significantly improved she still wears her sling and brace.  Good CMS to her right hand.  Has a hydrocolloid or Aquasol dressing over her right shoulder.  Hemoglobin did increase to 8.9.  She has been normotensive and afebrile and also on room air.  For pain she has been taking oxycodone either 5 or 10 we are now been a switch that to 5 mg every 3 hours and not 10 and she usually does take anywhere from 3-5 doses per day.  For nighttime rest and sleep she also can have the Lorazepam either 0.5 or 1 mg she takes the full milligrams tab before bedtime this is chronic for her.  Otherwise for pain she can take Tylenol thousand milligrams 3 times a day she is on stool softeners.  Omeprazole for reflux twice daily 20 mg.  Is also on Seroquel and Risperdal.  Getting extra nutrient supplements also regular diet.  Is also on Fosamax as well as vitamin D2 50,000 units weekly.  We did a chance to discuss her rehabilitation process and at this point she does see orthopedics on January 23 and does feel comfortable upon going home and she will also receive services.  Review of Systems  Denies chills and fever coughing wheezing chest pain dizziness or vertigo nausea vomiting diarrhea dysuria flulike symptoms headache stiff neck swollen glands rashes or sores.  History of COPD anxiety hypertension hyperlipidemia recent closed humeral fracture status post ORIF  Vitals:    01/21/19 0812   BP: 145/87   Pulse: 62   Resp: 16   Temp: 97.5  F (36.4  C)   SpO2: 93%       Physical Exam    Constitutional: She is oriented to person, place, and time. No distress.   HENT:   Neck: Neck supple. No thyromegaly  present.   Cardiovascular: Normal rate, regular rhythm and normal heart sounds.   Pulmonary/Chest: Breath sounds normal.   Abdominal: Bowel sounds are normal. There is no tenderness.  Musculoskeletal:   Recent closed humeral fracture status post ORIF.  Hydrocolloid dressing the right shoulder.  Sling and splint.  Good CMS to her right hand.  Pain is managed.   Lymphadenopathy:     She has no cervical adenopathy.   Neurological: She is alert and oriented to person, place, and time.   Skin: Skin is warm and dry. No rash noted.   Psychiatric: Her behavior is normal.   Lab Results   Component Value Date    WBC 10.8 01/05/2019    HGB 8.9 (L) 01/14/2019    HCT 28.4 (L) 01/05/2019    MCV 92 01/05/2019     01/05/2019     Results for orders placed or performed in visit on 01/14/19   Basic Metabolic Panel   Result Value Ref Range    Sodium 142 136 - 145 mmol/L    Potassium 3.8 3.5 - 5.0 mmol/L    Chloride 111 (H) 98 - 107 mmol/L    CO2 22 22 - 31 mmol/L    Anion Gap, Calculation 9 5 - 18 mmol/L    Glucose 79 70 - 125 mg/dL    Calcium 8.7 8.5 - 10.5 mg/dL    BUN 12 8 - 22 mg/dL    Creatinine 0.68 0.60 - 1.10 mg/dL    GFR MDRD Af Amer >60 >60 mL/min/1.73m2    GFR MDRD Non Af Amer >60 >60 mL/min/1.73m2       No results found for: HGBA1C    MEDICATION LIST:  Current Outpatient Medications   Medication Sig   ? acetaminophen (TYLENOL) 325 MG tablet Take 2 tablets (650 mg total) by mouth daily as needed.   ? acetaminophen (TYLENOL) 500 MG tablet Take 2 tablets (1,000 mg total) by mouth 3 (three) times a day.   ? albuterol (VENTOLIN HFA) 90 mcg/actuation inhaler INHALE 1 TO 2 PUFFS BY MOUTH EVERY 4 TO 6 HOURS AS NEEDED   ? alendronate (FOSAMAX) 70 MG tablet Take 70 mg by mouth every 7 days. On Thursday. Take in the morning on an empty stomach with a full glass of water 30 minutes before food   ? aspirin 325 MG tablet Take 1 tablet (325 mg total) by mouth daily with breakfast.   ? budesonide-formoterol (SYMBICORT) 160-4.5  mcg/actuation inhaler Inhale 2 puffs 2 (two) times a day.   ? docosanol (ABREVA) 10 % Crea Apply 1 application topically 5 x daily PRN (for cold sores).   ? docusate sodium (COLACE) 100 MG capsule Take 1 capsule (100 mg total) by mouth 2 (two) times a day.   ? ergocalciferol (VITAMIN D2) 50,000 unit capsule TAKE 1 CAPSULE BY MOUTH ONCE A WEEK (Patient taking differently: Take 50,000 Units by mouth once a week. On Thursday      )   ? gabapentin (NEURONTIN) 100 MG capsule Take 100 mg by mouth at bedtime.   ? LORazepam (ATIVAN) 0.5 MG tablet Take 1-2 tablets (0.5-1 mg total) by mouth at bedtime as needed.   ? metoprolol tartrate (LOPRESSOR) 100 MG tablet Take 1 tablet (100 mg total) by mouth 2 (two) times a day.   ? nicotine (NICODERM CQ) 14 mg/24 hr Place 1 patch on the skin daily.   ? omeprazole (PRILOSEC) 20 MG capsule Take 1 capsule (20 mg total) by mouth 2 (two) times a day before meals.   ? oxyCODONE (ROXICODONE) 5 MG immediate release tablet Take 1-2 tablets (5-10 mg total) by mouth every 3 (three) hours as needed. (Patient taking differently: Take 5 mg by mouth every 3 (three) hours as needed.       )   ? polyethylene glycol (MIRALAX) 17 gram packet Take 1 packet (17 g total) by mouth daily.   ? QUEtiapine (SEROQUEL) 100 MG tablet Take 2 tablets (200 mg total) by mouth at bedtime.   ? risperiDONE (RISPERDAL) 1 MG tablet Take 1 tablet (1 mg total) by mouth 3 (three) times a day.   ? senna-docusate (PERICOLACE) 8.6-50 mg tablet Take 1 tablet by mouth 2 (two) times a day.   ? venlafaxine (EFFEXOR-XR) 150 MG 24 hr capsule Take 2 capsules (300 mg total) by mouth daily.       DISCHARGE DIAGNOSIS:    ICD-10-CM    1. Hypertension I10    2. Closed fracture of proximal end of right humerus with delayed healing, unspecified fracture morphology, subsequent encounter S42.201G    3. ONEIDA (generalized anxiety disorder) F41.1    4. Gastroesophageal reflux disease, esophagitis presence not specified K21.9        MEDICAL  EQUIPMENT NEEDS:  None    DISCHARGE PLAN/FACE TO FACE:  I certify that services are/were furnished while this patient was under the care of a physician and that a physician or an allowed non-physician practitioner (NPP), had a face-to-face encounter that meets the physician face-to-face encounter requirements. The encounter was in whole, or in part, related to the primary reason for home health. The patient is confined to his/her home and needs intermittent skilled nursing, physical therapy, speech-language pathology, or the continued need for occupational therapy. A plan of care has been established by a physician and is periodically reviewed by a physician.  Date of Face-to-Face Encounter: January 21, 2019    I certify that, based on my findings, the following services are medically necessary home health services: She will be discharging to home on January 23, 2019 with current medications and narcotics will also have physical and occupational therapy home health aide and nursing.  The home care agency has not yet been identified.    My clinical findings support the need for the above skilled services because: (Please write a brief narrative summary that describes what the RN, PT, SLP, or other services will be doing in the home. A list of diagnoses in this section does not meet the CMS requirements.)  She will require the skilled services secondary to her recent humeral fracture status post ORIF along with her other chronic medical conditions home safety transfers and various exercise along with self-care deficits and medication management.  Secondary to multiple chronic medical conditions as well as safety transfers exercises    This patient is homebound because: (Please write a brief narrative summary describing the functional limitations as to why this patient is homebound and specifically what makes this patient homebound.)  Rehabilitation self-care deficits and medication management.    The patient is, or  has been, under my care and I have initiated the establishment of the plan of care. This patient will be followed by a physician who will periodically review the plan of care.    Schedule follow up visit with primary care provider within 7 days to reestablish care.  She will follow-up with orthopedics on January 23, 2019.  She will also follow-up with her primary care doctor regarding medication management future laboratory studies certainly again looking at the hemoglobin as well as her upcoming vitamin D level in the near future.  We did talk about pain control her narcotics as well as her other medications.  She did not have any other further questions she feels as if the therapy process was a positive experience for her.    Discharge coordination of care greater than 30 minutes  Electronically signed by: Michael Duane Johnson, CNP

## 2021-06-18 NOTE — LETTER
Letter by Johnson, Michael Duane, CNP at      Author: Johnson, Michael Duane, CNP Service: -- Author Type: --    Filed:  Encounter Date: 1/10/2019 Status: (Other)         Patient: Yaneth Holguin   MR Number: [id number]   YOB: 1950   Date of Visit: 1/10/2019     [facility]    Facility:   [facility] [[id number]]   Code Status: FULL CODE      CHIEF COMPLAINT/REASON FOR VISIT:  Chief Complaint   Patient presents with   ? Follow Up     rehab, shoulder       HISTORY:      HPI: Yaneth is a 68 y.o. female who I had the pleasure to visit with secondary to her most recent hospitalization January 3 - January 8, 2019 secondary to a fall sustaining a closed fracture of the proximal end of the right wrist with delayed healing status post ORIF.  Workup did include a right shoulder x-ray which did show a mildly displaced fracture of the surgical neck of the proximal right humerus with anterior displacement of the shaft in the last and the humeral head is rotated posteriorly but does maintain contact with the glenoid.  The initial laboratory studies did show sodium of 134 potassium 6.3 BUN 38 creatinine 1.80 her initial white cells were 19 hemoglobin 13.5 hemoglobin eventually did drop down on January 7 8.1.  Regarding her right proximal humeral fracture with anterior displacement did have a ORIF he also did have acute renal failure which required IV fluids she also did have hyperkalemia which did resolve and the most current potassium on January 7 3.7.  She did have traumatic rhabdomyolysis the creatinine level was at 807 and did come down to 247 she also did have lymphocytosis secondary to a fall.  She did have acute blood loss anemia hemoglobin did drop we will recheck her hemoglobin on the transitional care unit.  She is a smoker.  Also has anxiety disorder he does take Risperdal and Seroquel also does take Lorazepam for sleep.  She also had hypoglycemia secondary to poor  p.o. intake.  Also moderate protein calorie malnutrition she did consult with dietitian and also continue to evaluate her blood pressures.  She currently is on the transitional care unit had a chance to visit with her regarding her overall care and she states she is doing pretty good at this time no particular complaints she does take Tylenol every 8 hours thousand milligrams she also can have oxycodone as needed 5 or 10 mg he does take about 3 doses per day also is on gabapentin 100 mg at bedtime.  She has been normotensive and afebrile and also on room air.  Did have Accu-Cheks performed most of the blood sugars have been less than 120 there was one at 173 so we will discontinue the Accu-Cheks.  No longer requiring oxygen will discontinue the oxygen.  Does take Lorazepam at night she does not want to make any changes from his medication usually takes a 4 mg tab.  No heartburn reflux is on omeprazole.  Also is on vitamin D2 50,000 units weekly.  Moods have been stable.  Appetite slowly improving currently does have a sling and splint.    Past Medical History:   Diagnosis Date   ? Anxiety    ? Distal radius fracture 2015    Left Distal (ace wrapped on admission). Fell when climbing stairs   ? Endometriosis    ? Fall 2015   ? Gastritis    ? GERD (gastroesophageal reflux disease)    ? History of kidney stones    ? Hyperlipidemia    ? Hypertension    ? Insomnia    ? Left elbow fracture 2015   ? Osteoporosis              Family History   Problem Relation Age of Onset   ? COPD Mother    ? Lung cancer Brother    ? COPD Father          age 84   ? Hypertension Sister    ? Breast cancer Cousin      Social History     Socioeconomic History   ? Marital status:      Spouse name: Not on file   ? Number of children: Not on file   ? Years of education: Not on file   ? Highest education level: Not on file   Social Needs   ? Financial resource strain: Not on file   ? Food insecurity - worry: Not on file   ?  Food insecurity - inability: Not on file   ? Transportation needs - medical: Not on file   ? Transportation needs - non-medical: Not on file   Occupational History   ? Not on file   Tobacco Use   ? Smoking status: Current Every Day Smoker     Packs/day: 0.50     Years: 45.00     Pack years: 22.50     Types: Cigarettes     Start date: 8/4/1970   ? Smokeless tobacco: Current User   ? Tobacco comment: Green smoking cessation folder given   Substance and Sexual Activity   ? Alcohol use: No   ? Drug use: No   ? Sexual activity: Not Currently   Other Topics Concern   ? 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  She has 2 children and 4 grandchildren.         Review of Systems  Denies chills and fever coughing wheezing chest pain dizziness or vertigo nausea vomiting diarrhea dysuria flulike symptoms headache stiff neck swollen glands rashes or sores.  History of COPD anxiety hypertension hyperlipidemia recent closed humeral fracture status post ORIF  Current Outpatient Medications   Medication Sig   ? acetaminophen (TYLENOL) 325 MG tablet Take 2 tablets (650 mg total) by mouth daily as needed.   ? acetaminophen (TYLENOL) 500 MG tablet Take 2 tablets (1,000 mg total) by mouth 3 (three) times a day.   ? albuterol (VENTOLIN HFA) 90 mcg/actuation inhaler INHALE 1 TO 2 PUFFS BY MOUTH EVERY 4 TO 6 HOURS AS NEEDED   ? alendronate (FOSAMAX) 70 MG tablet Take 70 mg by mouth every 7 days. On Thursday. Take in the morning on an empty stomach with a full glass of water 30 minutes before food   ? aspirin 325 MG tablet Take 1 tablet (325 mg total) by mouth daily with breakfast.   ? bisacodyl (DULCOLAX) 10 mg suppository One supp LA qday pRN constipation (no BM x preceding 48 hrs)   ? budesonide-formoterol (SYMBICORT) 160-4.5 mcg/actuation inhaler Inhale 2 puffs 2 (two) times a day.   ? docosanol (ABREVA) 10 % Crea Apply 1 application topically 5 x daily PRN (for  cold sores).   ? docusate sodium (COLACE) 100 MG capsule Take 1 capsule (100 mg total) by mouth 2 (two) times a day.   ? ergocalciferol (VITAMIN D2) 50,000 unit capsule TAKE 1 CAPSULE BY MOUTH ONCE A WEEK (Patient taking differently: Take 50,000 Units by mouth once a week. On Thursday      )   ? gabapentin (NEURONTIN) 100 MG capsule Take 100 mg by mouth at bedtime.   ? LORazepam (ATIVAN) 0.5 MG tablet Take 1-2 tablets (0.5-1 mg total) by mouth at bedtime as needed.   ? metoprolol tartrate (LOPRESSOR) 100 MG tablet Take 1 tablet (100 mg total) by mouth 2 (two) times a day.   ? nicotine (NICODERM CQ) 14 mg/24 hr Place 1 patch on the skin daily.   ? omeprazole (PRILOSEC) 20 MG capsule Take 1 capsule (20 mg total) by mouth 2 (two) times a day before meals.   ? oxyCODONE (ROXICODONE) 5 MG immediate release tablet Take 1-2 tablets (5-10 mg total) by mouth every 3 (three) hours as needed.   ? polyethylene glycol (MIRALAX) 17 gram packet Take 1 packet (17 g total) by mouth daily.   ? QUEtiapine (SEROQUEL) 100 MG tablet Take 2 tablets (200 mg total) by mouth at bedtime.   ? risperiDONE (RISPERDAL) 1 MG tablet Take 1 tablet (1 mg total) by mouth 3 (three) times a day.   ? senna-docusate (PERICOLACE) 8.6-50 mg tablet Take 1 tablet by mouth 2 (two) times a day.   ? venlafaxine (EFFEXOR-XR) 150 MG 24 hr capsule Take 2 capsules (300 mg total) by mouth daily.       .There were no vitals filed for this visit.  Blood pressure 132/75 pulse 77 temperature 98.6 saturation room air 96%  Physical Exam   Constitutional: She is oriented to person, place, and time. No distress.   HENT:   Head: Normocephalic.   Eyes: Pupils are equal, round, and reactive to light.   Neck: Neck supple. No thyromegaly present.   Cardiovascular: Normal rate, regular rhythm and normal heart sounds.   Pulmonary/Chest: Breath sounds normal.   Abdominal: Bowel sounds are normal. There is no tenderness. There is no guarding.   Musculoskeletal:   Recent closed  humeral fracture status post ORIF.  Hydrocolloid dressing the right shoulder.  Sling and splint.  Good CMS to her right hand.  Pain is managed.   Lymphadenopathy:     She has no cervical adenopathy.   Neurological: She is alert and oriented to person, place, and time.   Skin: Skin is warm and dry. No rash noted.   Psychiatric: Her behavior is normal.         LABS:   Lab Results   Component Value Date    WBC 10.8 01/05/2019    HGB 8.1 (L) 01/07/2019    HCT 28.4 (L) 01/05/2019    MCV 92 01/05/2019     01/05/2019     Results for orders placed or performed during the hospital encounter of 01/03/19   Basic Metabolic Panel   Result Value Ref Range    Sodium 145 136 - 145 mmol/L    Potassium 3.7 3.5 - 5.0 mmol/L    Chloride 121 (H) 98 - 107 mmol/L    CO2 21 (L) 22 - 31 mmol/L    Anion Gap, Calculation 3 (L) 5 - 18 mmol/L    Glucose 89 70 - 125 mg/dL    Calcium 8.2 (L) 8.5 - 10.5 mg/dL    BUN 14 8 - 22 mg/dL    Creatinine 0.68 0.60 - 1.10 mg/dL    GFR MDRD Af Amer >60 >60 mL/min/1.73m2    GFR MDRD Non Af Amer >60 >60 mL/min/1.73m2       No results found for: HGBA1C      ASSESSMENT:      ICD-10-CM    1. Closed fracture of proximal end of right humerus with delayed healing, unspecified fracture morphology, subsequent encounter S42.201G    2. Hypertension I10    3. Gait instability R26.81    4. Anemia associated with acute blood loss D62        PLAN:    Discontinue the oxygen discontinue the Accu-Cheks.  Her pain is also well managed.  Continue to manage and follow does see orthopedics on January 23.  She did not have any other further questions.    Electronically signed by: Michael Duane Johnson, CNP

## 2021-06-18 NOTE — LETTER
Letter by Celso Lyon DO at      Author: Celso Lyon DO Service: -- Author Type: --    Filed:  Encounter Date: 1/9/2019 Status: (Other)         Patient: Yaneth Holguin   MR Number: [id number]   YOB: 1950   Date of Visit: 1/9/2019     [facility]      Facility:    [facility] [[id number]]  Code Status: UNKNOWN      Chief Complaint/Reason for Visit:  Chief Complaint   Patient presents with   ? H & P     Mechanical fall with proximal right humerus fracture and status post ORIF, acute renal failure in the hospital as well as hyperkalemia as well as traumatic rhabdomyolysis.  Pain management acute blood loss anemia, anxiety disorder, hyponatremia, hypoglycemia, history of COPD, moderate protein calorie malnutrition, essential hypertension.       HPI:   Yaneth is a 68 y.o. female who was recently admitted to the hospital on 1/3/2018.  She does have a past medical history of smoking osteoporosis hypertension hyperlipidemia and COPD and anxiety.  She did have a fall at home which was a mechanical fall and there was no head injury.  She is brought to the emergency room and x-ray of the shoulder revealed a proximal right humerus fracture.  She was seen by orthopedic surgery and underwent ORIF without any perioperative or postoperative complications.  She did have acute renal failure however which did improve and resolve with IV fluids baseline creatinine 0.6.  She did have hyperkalemia that did resolve and also traumatic rhabdomyolysis with a CK level of 807 that came down to 247.  Right shoulder pain was treated with IV Dilaudid and oxycodone and pain was in adequate control as reported.  She did have an acute blood loss anemia and likely delusional from IV fluids and the no signs of any bleeding.  Her hyponatremia did resolve and she did have low blood sugars secondary to poor oral intake and she does have moderate protein calorie malnutrition as well as  COPD which she did not have an exacerbation.  She was treated appropriately and then transferred here postoperatively in stable condition.    Is undergoing physical and occupational therapy without any issues at this time and claims her pain is tolerable with the current pain regime.  She is moving her bowels adequately at this time and she is progressing as anticipated with physical and occupational therapy    Past Medical History:  Past Medical History:   Diagnosis Date   ? Anxiety    ? Distal radius fracture 2015    Left Distal (ace wrapped on admission). Fell when climbing stairs   ? Endometriosis    ? Fall 2015   ? Gastritis    ? GERD (gastroesophageal reflux disease)    ? History of kidney stones    ? Hyperlipidemia    ? Hypertension    ? Insomnia    ? Left elbow fracture 2015   ? Osteoporosis            Surgical History:  Past Surgical History:   Procedure Laterality Date   ? OOPHORECTOMY     ? IL EVACUATE MOLE OF UTERUS      Description: Dilation And Curettage For Hydatidiform Mole;  Recorded: 2008;   ? IL REMOVE TONSILS/ADENOIDS,<13 Y/O      Description: Tonsillectomy With Adenoidectomy;  Recorded: 2008;   ? IL TOTAL ABDOM HYSTERECTOMY  1986    Description: Total Abdominal Hysterectomy;  Recorded: 2010;   ? IL TOTAL HIP ARTHROPLASTY Bilateral     Description: Total Hip Replacement;  Recorded: 2008;  Comments: for AVN; Lt.  and had to be redone because of Fx; Rt. done    ? VERTEBROPLASTY  2015    T4   ? WRIST FRACTURE SURGERY Left 2015       Family History:   Family History   Problem Relation Age of Onset   ? COPD Mother    ? Lung cancer Brother    ? COPD Father          age 84   ? Hypertension Sister    ? Breast cancer Cousin        Social History:    Social History     Socioeconomic History   ? Marital status:      Spouse name: None   ? Number of children: None   ? Years of education: None   ? Highest education level: None   Social Needs   ?  Financial resource strain: None   ? Food insecurity - worry: None   ? Food insecurity - inability: None   ? Transportation needs - medical: None   ? Transportation needs - non-medical: None   Occupational History   ? None   Tobacco Use   ? Smoking status: Current Every Day Smoker     Packs/day: 0.50     Years: 45.00     Pack years: 22.50     Types: Cigarettes     Start date: 8/4/1970   ? Smokeless tobacco: Current User   ? Tobacco comment: Green smoking cessation folder given   Substance and Sexual Activity   ? Alcohol use: No   ? Drug use: No   ? Sexual activity: Not Currently   Other Topics Concern   ? 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  She has 2 children and 4 grandchildren.          Review of Systems   Constitutional:        Patient claims her pain is under adequate control however denies any fevers chills nausea vomiting diarrhea change in vision hearing taste or smell weakness one-sided chest pain shortness of breath she is moving her bowels adequately and there is no urgency frequency burning with urination polyphagia polydipsia polyuria depression or anxiety and the remainder the review of systems is negative patient claims her appetite is good.       Vitals:    01/09/19 0925   BP: 162/79   Pulse: 78   Resp: 20   Temp: 97.6  F (36.4  C)   SpO2: 95%       Physical Exam   Constitutional: No distress.   HENT:   Head: Atraumatic.   Nose: Nose normal.   Mouth/Throat: Oropharynx is clear and moist.   Eyes: Conjunctivae are normal. Right eye exhibits no discharge. Left eye exhibits no discharge.   Neck: Neck supple. No thyromegaly present.   Cardiovascular: Normal rate and regular rhythm.   Pulmonary/Chest: Effort normal. She has wheezes. She has no rales.   Abdominal: Soft. Bowel sounds are normal. She exhibits no distension. There is no tenderness. There is no rebound.   Musculoskeletal: She exhibits no edema or tenderness.    Neurological: She is alert. No cranial nerve deficit. She exhibits normal muscle tone.   Skin: Skin is warm and dry. She is not diaphoretic.   Psychiatric: She has a normal mood and affect. Her behavior is normal.       Medication List:  Current Outpatient Medications   Medication Sig   ? acetaminophen (TYLENOL) 325 MG tablet Take 2 tablets (650 mg total) by mouth daily as needed.   ? acetaminophen (TYLENOL) 500 MG tablet Take 2 tablets (1,000 mg total) by mouth 3 (three) times a day.   ? albuterol (VENTOLIN HFA) 90 mcg/actuation inhaler INHALE 1 TO 2 PUFFS BY MOUTH EVERY 4 TO 6 HOURS AS NEEDED   ? alendronate (FOSAMAX) 70 MG tablet Take 70 mg by mouth every 7 days. On Thursday. Take in the morning on an empty stomach with a full glass of water 30 minutes before food   ? aspirin 325 MG tablet Take 1 tablet (325 mg total) by mouth daily with breakfast.   ? bisacodyl (DULCOLAX) 10 mg suppository One supp FL qday pRN constipation (no BM x preceding 48 hrs)   ? budesonide-formoterol (SYMBICORT) 160-4.5 mcg/actuation inhaler Inhale 2 puffs 2 (two) times a day.   ? docosanol (ABREVA) 10 % Crea Apply 1 application topically 5 x daily PRN (for cold sores).   ? docusate sodium (COLACE) 100 MG capsule Take 1 capsule (100 mg total) by mouth 2 (two) times a day.   ? ergocalciferol (VITAMIN D2) 50,000 unit capsule TAKE 1 CAPSULE BY MOUTH ONCE A WEEK (Patient taking differently: Take 50,000 Units by mouth once a week. On Thursday      )   ? gabapentin (NEURONTIN) 100 MG capsule Take 100 mg by mouth at bedtime.   ? LORazepam (ATIVAN) 0.5 MG tablet Take 1-2 tablets (0.5-1 mg total) by mouth at bedtime as needed.   ? metoprolol tartrate (LOPRESSOR) 100 MG tablet Take 1 tablet (100 mg total) by mouth 2 (two) times a day.   ? nicotine (NICODERM CQ) 14 mg/24 hr Place 1 patch on the skin daily.   ? omeprazole (PRILOSEC) 20 MG capsule Take 1 capsule (20 mg total) by mouth 2 (two) times a day before meals.   ? oxyCODONE (ROXICODONE) 5  MG immediate release tablet Take 1-2 tablets (5-10 mg total) by mouth every 3 (three) hours as needed.   ? polyethylene glycol (MIRALAX) 17 gram packet Take 1 packet (17 g total) by mouth daily.   ? QUEtiapine (SEROQUEL) 100 MG tablet Take 2 tablets (200 mg total) by mouth at bedtime.   ? risperiDONE (RISPERDAL) 1 MG tablet Take 1 tablet (1 mg total) by mouth 3 (three) times a day.   ? senna-docusate (PERICOLACE) 8.6-50 mg tablet Take 1 tablet by mouth 2 (two) times a day.   ? venlafaxine (EFFEXOR-XR) 150 MG 24 hr capsule Take 2 capsules (300 mg total) by mouth daily.       Labs: Hospital labs are as follows sodium was 132 up to 145 potassium was 6.3 went down to 3.7, chloride is 106 up to 121, CO2 was 38 down to 21, hemoglobin is 9.6 down to 8.2, creatinine is 1.80 down to 0.68.  White count was 19.0, down to 10.8, and hemoglobin was at 13.5 down to 8.1.  Hematocrit was 43.9 down to 28.4 and platelets would went from 411 down to 84.  ALT and AST were within normal limits.      Assessment:    ICD-10-CM    1. History of right shoulder fracture Z87.81    2. History of open reduction and internal fixation (ORIF) procedure Z98.890    3. Pain management R52    4. Hyperkalemia E87.5    5. Traumatic rhabdomyolysis, subsequent encounter T79.6XXD    6. Hyponatremia E87.1    7. Acute blood loss anemia D62    8. History of COPD Z87.09    9. Moderate protein-calorie malnutrition (H) E44.0        Plan: Plan at this time tomorrow check creatinine kinase for rhabdomyolysis as well as a basic metabolic profile secondary decreased potassium and low sodium and acute kidney injury.  Registered dietitian will see for moderate protein malnutrition and blood pressure will be done every 12 hours over the weekend.  I will continue with physical and occupational therapy as ordered and no other changes to care plan at this time.  Care plan was reviewed and is appropriate.  Pain is under adequate control she will be no changes in that  medication at this time.        Electronically signed by: Celso Lyon DO

## 2021-06-18 NOTE — LETTER
Letter by Johnson, Michael Duane, CNP at      Author: Johnson, Michael Duane, CNP Service: -- Author Type: --    Filed:  Encounter Date: 1/14/2019 Status: (Other)         Patient: Yaneth Holguin   MR Number: [id number]   YOB: 1950   Date of Visit: 1/14/2019     [facility]    Facility:   [facility] [[id number]]   Code Status: FULL CODE      CHIEF COMPLAINT/REASON FOR VISIT:  Chief Complaint   Patient presents with   ? Follow Up     rehab, shoulder       HISTORY:      HPI: Yaneth is a 68 y.o. female who I had the chance to revisit with secondary to her most recent hospitalization January 3 - January 20, 2019 secondary to positive closed fracture of approximately right humerus status post ORIF.  She currently is in the sling and splint.  Good CMS to her right hand able to wiggle her fingers without any problems.  She is on a regular diet getting extra nutrient supplements.  For her pain she can have oxycodone as needed usually takes about 4-5 doses per day she is also on Tylenol thousand milligrams every 8 hours.  Also gabapentin at night.  For her moods she is on Seroquel Risperdal and does take Lorazepam at night as needed usually does take her doses.  Denies any bowel or bladder problems.  Appetite is picking up.  Making good progress.  Is on inhalers secondary to COPD no acute exacerbation.  She did not have any particular questions or orthopedic visit is January 23.    Past Medical History:   Diagnosis Date   ? Anxiety    ? Distal radius fracture 05/30/2015    Left Distal (ace wrapped on admission). Fell when climbing stairs   ? Endometriosis    ? Fall 05/30/2015   ? Gastritis    ? GERD (gastroesophageal reflux disease)    ? History of kidney stones    ? Hyperlipidemia    ? Hypertension    ? Insomnia    ? Left elbow fracture 02/2015   ? Osteoporosis              Family History   Problem Relation Age of Onset   ? COPD Mother    ? Lung cancer Brother    ?  COPD Father          age 84   ? Hypertension Sister    ? Breast cancer Cousin      Social History     Socioeconomic History   ? Marital status:      Spouse name: Not on file   ? Number of children: Not on file   ? Years of education: Not on file   ? Highest education level: Not on file   Social Needs   ? Financial resource strain: Not on file   ? Food insecurity - worry: Not on file   ? Food insecurity - inability: Not on file   ? Transportation needs - medical: Not on file   ? Transportation needs - non-medical: Not on file   Occupational History   ? Not on file   Tobacco Use   ? Smoking status: Current Every Day Smoker     Packs/day: 0.50     Years: 45.00     Pack years: 22.50     Types: Cigarettes     Start date: 1970   ? Smokeless tobacco: Current User   ? Tobacco comment: Green smoking cessation folder given   Substance and Sexual Activity   ? Alcohol use: No   ? Drug use: No   ? Sexual activity: Not Currently   Other Topics Concern   ? Not on file   Social History Narrative    She is single and is retired () from [employer] in [facility] [employer].  She has 2 children and 4 grandchildren.         Review of Systems  Denies chills and fever coughing wheezing chest pain dizziness or vertigo nausea vomiting diarrhea dysuria flulike symptoms headache stiff neck swollen glands rashes or sores.  History of COPD anxiety hypertension hyperlipidemia recent closed humeral fracture status post ORIF    Current Outpatient Medications   Medication Sig   ? acetaminophen (TYLENOL) 325 MG tablet Take 2 tablets (650 mg total) by mouth daily as needed.   ? acetaminophen (TYLENOL) 500 MG tablet Take 2 tablets (1,000 mg total) by mouth 3 (three) times a day.   ? albuterol (VENTOLIN HFA) 90 mcg/actuation inhaler INHALE 1 TO 2 PUFFS BY MOUTH EVERY 4 TO 6 HOURS AS NEEDED   ? alendronate (FOSAMAX) 70 MG tablet Take 70 mg by mouth every 7 days. On Thursday. Take in the morning on an empty stomach with a full glass of  water 30 minutes before food   ? aspirin 325 MG tablet Take 1 tablet (325 mg total) by mouth daily with breakfast.   ? bisacodyl (DULCOLAX) 10 mg suppository One supp MT qday pRN constipation (no BM x preceding 48 hrs)   ? budesonide-formoterol (SYMBICORT) 160-4.5 mcg/actuation inhaler Inhale 2 puffs 2 (two) times a day.   ? docosanol (ABREVA) 10 % Crea Apply 1 application topically 5 x daily PRN (for cold sores).   ? docusate sodium (COLACE) 100 MG capsule Take 1 capsule (100 mg total) by mouth 2 (two) times a day.   ? ergocalciferol (VITAMIN D2) 50,000 unit capsule TAKE 1 CAPSULE BY MOUTH ONCE A WEEK (Patient taking differently: Take 50,000 Units by mouth once a week. On Thursday      )   ? gabapentin (NEURONTIN) 100 MG capsule Take 100 mg by mouth at bedtime.   ? LORazepam (ATIVAN) 0.5 MG tablet Take 1-2 tablets (0.5-1 mg total) by mouth at bedtime as needed.   ? metoprolol tartrate (LOPRESSOR) 100 MG tablet Take 1 tablet (100 mg total) by mouth 2 (two) times a day.   ? nicotine (NICODERM CQ) 14 mg/24 hr Place 1 patch on the skin daily.   ? omeprazole (PRILOSEC) 20 MG capsule Take 1 capsule (20 mg total) by mouth 2 (two) times a day before meals.   ? oxyCODONE (ROXICODONE) 5 MG immediate release tablet Take 1-2 tablets (5-10 mg total) by mouth every 3 (three) hours as needed.   ? polyethylene glycol (MIRALAX) 17 gram packet Take 1 packet (17 g total) by mouth daily.   ? QUEtiapine (SEROQUEL) 100 MG tablet Take 2 tablets (200 mg total) by mouth at bedtime.   ? risperiDONE (RISPERDAL) 1 MG tablet Take 1 tablet (1 mg total) by mouth 3 (three) times a day.   ? senna-docusate (PERICOLACE) 8.6-50 mg tablet Take 1 tablet by mouth 2 (two) times a day.   ? venlafaxine (EFFEXOR-XR) 150 MG 24 hr capsule Take 2 capsules (300 mg total) by mouth daily.       .There were no vitals filed for this visit.  Blood pressure 106/56 pulse 80 respirations 18 temperature 98.1  Physical Exam   Constitutional: She is oriented to person,  place, and time. No distress.   HENT:   Eyes: Pupils are equal, round, and reactive to light.   Neck: Neck supple. No thyromegaly present.   Cardiovascular: Normal rate, regular rhythm and normal heart sounds.   Pulmonary/Chest: Breath sounds normal.   Musculoskeletal:   Recent closed humeral fracture status post ORIF.  Hydrocolloid dressing the right shoulder.  Sling and splint.  Good CMS to her right hand.  Pain is managed.   Lymphadenopathy:     She has no cervical adenopathy.   Neurological: She is alert and oriented to person, place, and time.   Skin: Skin is warm and dry. No rash noted.   Psychiatric: Her behavior is normal.    LABS:   Lab Results   Component Value Date    WBC 10.8 01/05/2019    HGB 8.1 (L) 01/07/2019    HCT 28.4 (L) 01/05/2019    MCV 92 01/05/2019     01/05/2019     Results for orders placed or performed in visit on 01/10/19   Basic Metabolic Panel   Result Value Ref Range    Sodium 146 (H) 136 - 145 mmol/L    Potassium 3.5 3.5 - 5.0 mmol/L    Chloride 113 (H) 98 - 107 mmol/L    CO2 25 22 - 31 mmol/L    Anion Gap, Calculation 8 5 - 18 mmol/L    Glucose 92 70 - 125 mg/dL    Calcium 9.0 8.5 - 10.5 mg/dL    BUN 8 8 - 22 mg/dL    Creatinine 0.61 0.60 - 1.10 mg/dL    GFR MDRD Af Amer >60 >60 mL/min/1.73m2    GFR MDRD Non Af Amer >60 >60 mL/min/1.73m2           ASSESSMENT:      ICD-10-CM    1. Closed fracture of proximal end of right humerus with delayed healing, unspecified fracture morphology, subsequent encounter S42.201G    2. ONEIDA (generalized anxiety disorder) F41.1    3. Hypertension I10    4. Mood disorder due to a general medical condition F06.30        PLAN:    No further changes at this time.  She does have a visit with orthopedics in January 23 she does have a BMP as well as hemoglobin ordered for today as well results not yet back by this dictation.  Continue medication and plan.      Electronically signed by: Michael Duane Johnson, ALTAF

## 2021-06-19 NOTE — LETTER
Letter by Paola Soria PA-C at      Author: Paola Soria PA-C Service: -- Author Type: --    Filed:  Encounter Date: 4/29/2019 Status: (Other)         Yaneth Holguin  [address] [facility] [address]             April 29, 2019         Dear Ms. Chelsie,    Below are the results from your recent visit:    Resulted Orders   HM2(CBC w/o Differential)   Result Value Ref Range    WBC 9.8 4.0 - 11.0 thou/uL    RBC 4.58 3.80 - 5.40 mill/uL    Hemoglobin 12.3 12.0 - 16.0 g/dL    Hematocrit 38.2 35.0 - 47.0 %    MCV 83 80 - 100 fL    MCH 26.7 (L) 27.0 - 34.0 pg    MCHC 32.1 32.0 - 36.0 g/dL    RDW 13.8 11.0 - 14.5 %    Platelets 392 140 - 440 thou/uL    MPV 8.3 7.0 - 10.0 fL   Basic Metabolic Panel   Result Value Ref Range    Sodium 140 136 - 145 mmol/L    Potassium 4.7 3.5 - 5.0 mmol/L    Chloride 113 (H) 98 - 107 mmol/L    CO2 17 (L) 22 - 31 mmol/L    Anion Gap, Calculation 10 5 - 18 mmol/L    Glucose 77 70 - 125 mg/dL    Calcium 8.9 8.5 - 10.5 mg/dL    BUN 27 (H) 8 - 22 mg/dL    Creatinine 0.85 0.60 - 1.10 mg/dL    GFR MDRD Af Amer >60 >60 mL/min/1.73m2    GFR MDRD Non Af Amer >60 >60 mL/min/1.73m2    Narrative    Fasting Glucose reference range is 70-99 mg/dL per  American Diabetes Association (ADA) guidelines.   Sedimentation Rate   Result Value Ref Range    Sed Rate 4 0 - 20 mm/hr   Vitamin D, Total (25-Hydroxy)   Result Value Ref Range    Vitamin D, Total (25-Hydroxy) 20.4 (L) 30.0 - 80.0 ng/mL    Narrative    Deficiency <10.0 ng/mL  Insufficiency 10.0-29.9 ng/mL  Sufficiency 30.0-80.0 ng/mL  Toxicity (possible) >100.0 ng/mL   Thyroid Stimulating Hormone (TSH)   Result Value Ref Range    TSH 1.46 0.30 - 5.00 uIU/mL   Albumin   Result Value Ref Range    Albumin 3.1 (L) 3.5 - 5.0 g/dL   C-Reactive Protein (CRP)   Result Value Ref Range    CRP 0.3 0.0 - 0.8 mg/dL   Prealbumin   Result Value Ref Range    Prealbumin 25.7 19.0 - 38.0 mg/dL         Vitamin D is still mildly low; daily vitamin D  over the counter 2000 units.    Please call with questions or contact us using [email or web address]t.    Sincerely,        Electronically signed by Paola Soria PA-C

## 2021-06-19 NOTE — LETTER
"Letter by Meche Romero NP at      Author: Meche Romero NP Service: -- Author Type: --    Filed:  Encounter Date: 11/5/2019 Status: Signed         Yaenth [facility]guin  [address] [facility] [address]             November 5, 2019         Dear Ms. Holguin,    Below are the results from your recent visit:    Resulted Orders   DXA Bone Density Scan    Narrative    10/31/2019      RE: Yaneth Holguin  YOB: 1950        Dear Meche Romero,    Patient Profile:  69 y.o. female, postmenopausal, is here for the follow up bone density   test.   History of fractures - Yes;  Humerus, Wrist and Fibula. Family history of   osteoporosis - Yes;  mother.  Family history of hip fracture: None.   Smoking history - Past. Osteoporosis treatment past -  Yes;  Prolia.   Osteoporosis treatment current - Yes;  Bisphosphonates.  Chronic medical   problems - Height loss, History of kidney stones, Hysterectomy and   Oophorectomy. High risk medications -  None.    Assessment:    1. The spine bone density is in the osteoporotic range at L1-L4 with   T-score of -3.1.  2. Femoral bone densities could not be assessed due to previous bilateral   total hip replacements.  3.  Bone density was checked in the wrist as an alternate site since the   hips could not be assessed.  In the  right 33% radius, osteoporosis is   seen with T score of -2.5.  4.  Since the scan in 2015, bone density has not significantly changed in   the AP spine or right 33% radius.    69 y.o. female with OSTEOPOROSIS and HIGH predicted future fracture risk.        Recommendations:  Stability or increase in bone density is regarded as a positive response   to an antiresorptive agent.  Therefore continuation of current management   would be reasonable.  Alternatively, if she has been on alendronate for   many years, consideration could be given to taking a \"drug-free holiday   \"or considering a different medication.      Bone densitometry was performed on " your patient using our [employer] Expression iDXA   densitometer. The results are summarized and a copy of the actual scans   are included for your review. In conformity with the International Society   of Clinical Densitometry's most recent position statement for DXA   interpretation (2015), the diagnosis will be made on the lowest measured   T-score of the lumbar spine, femoral neck, total proximal femur or 33%   radius. Note the change in terminology for diagnostic classification from   OSTEOPENIA to LOW BONE MASS. All trending for sequential exams will be   done using multiple vertebrae or the total proximal femur. Fracture risk   is based on the WHO Fracture Risk Assessment Tool (FRAX). If additional   information is needed or if you would like to discuss the results, please   do not hesitate to call me.       Thank you for referring this patient to [facility].   We are happy to be of service in support of you and your practice. If you   have any questions or suggestions to improve our service, please call me   at [phone].     Sincerely,     Vladimir Jones M.D. C.C.AZUL.  Osteoporosis Services, [facility]       We will discuss your current treatment at your appointment in January.  In the meantime, stay upright! :)    Please call with questions or contact us using "[employer]t.    Sincerely,        Electronically signed by Meche Romero NP

## 2021-06-19 NOTE — LETTER
Letter by Zo García MD at      Author: Zo García MD Service: -- Author Type: --    Filed:  Encounter Date: 3/26/2019 Status: (Other)         Yaneth Holguin  [address] [facility] [address]    March 26, 2019    Dear Ms. Holguin,    Welcome to [facility]! Your appointment information is below.   Please bring the following to your appointment:    Insurance Card, so we may scan it for our records    Drivers license or valid ID, so we may scan it for our records    Co-pay (as applicable per your insurance plan)    A current list of your medications including over the counter products such as vitamins and supplements    Your medical records including copies of X-Ray films if you are transferring your care from another clinic.  If you do not have your records, please fill out the release of information form and we will request those records.     Provider: Zo García MD  Appointment Date: April 29, 2019  Arrival Time: 2:00pm for PFT test, 3:00pm for Dr. García    Location: [address]        [facility], [address]    **Please allow adequate time for your commute and parking. If you are more than 10 minutes late, you may be asked to reschedule.     If you need to cancel or reschedule your appointment, please notify us at least 24 hours prior to your appointment time so we are able to make this time available for another patient.    Thank you for choosing the [facility] for your health care needs. If you have any questions, please do not hesitate to contact us at any time at   [phone]. We look forward to caring for you.     Sincerely,     [facility] staff

## 2021-06-19 NOTE — LETTER
Letter by Paola Soria PA-C at      Author: Paola Soria PA-C Service: -- Author Type: --    Filed:  Encounter Date: 3/26/2019 Status: (Other)         March 26, 2019     Patient: Yaneth Holguin   YOB: 1950   Date of Visit: 3/26/2019       To Whom it May Concern:    Yaneth Holguin was seen in my clinic on 3/26/2019.  This patient is no longer on clonazepam or any medication that should cause impaired driving.      If you have any questions or concerns, please don't hesitate to call.    Sincerely,         Electronically signed by Paola Soria PA-C

## 2021-06-20 NOTE — LETTER
Letter by Leyda Perry CHW at      Author: Leyda Perry CHW Service: -- Author Type: --    Filed:  Encounter Date: 2/17/2020 Status: (Other)               Dear Yaneth,                                                                               You were recently referred to the [facility]'s Clinic Care Coordination service.  This is a service offered through your Primary Care Clinic which can help you access resources, services in regard to your health and well-being goals. The clinic Community Health Worker has placed two calls to you to discuss the nature of this service and to offer enrollment.  If you are interested in learning more about Clinic Care Coordination, please call your Primary Care Clinic's Community Health Worker, Leyda, at [phone].                                                        Sincerely,                                                                              JANINE Crabtree                                                                                          Clinic Care Coordination                                                  [facility]

## 2021-06-21 NOTE — PROGRESS NOTES
HPI:  Yaneth Holguin is a 68 y.o. female who is seen for establishing care.  She needs an order for a nebulizer and albuterol nebs.  She states she feels that this works a little better when she is at home.  Does have a albuterol inhaler.  He uses her thyroid/long-acting rhonchal dilator twice daily.  She is working on nicotine cessation but is in her third month of Chantix and is still smoking half a pack a day.  She continues to have difficulty sleeping and anxiety although she is currently on 2 psychotic drugs one that she got from her nurse practitioner in psychiatry and another that she got from her family practice provider.  He denies dysarthria, difficulty speaking, tremors.  Has osteoporosis and is on Fosamax and needs a refill.  She takes vitamin D daily.  He has a spontaneous fracture of her thoracic spine and has chronic upper back pain.  Is tried over-the-counter Tylenol and ibuprofen for this concern and does not get relief from her pain.  Cannot reach her upper back to apply diclofenac gel.  She lives alone.  Does not have any personal care assistant in the home.    Chief Complaint   Patient presents with     Establish Care     Medication request     Nebulizer and Fosamax      ROS: Patient denies fever, chills, sweats, fainting, fatigue, weight change, dizziness, sleep problems, chest pain, palpitations, cough,  sore throat, changes in hearing, ear pain,tinnitus,  disphagia, sore throat, globus, changes in vision, eye pain eye redness, acid reflux, nausea, vomiting, diarrhea, constipation, black or bloody stools,  Dysuria, frequency, urinary incontinence, nocturia, hematuria, back pain,joint pain, bone pain, muscle cramps,edema, weakness, numbness, tingling of extremities, rash, itching, skin changes, swollen lymph nodes, thirst, increased urination, breast lumps, breast pain, nipple discharge, memory difficulties,  mood swings, (female)vaginal discharge, dyspareunia, menorrhagia, pelvic pain, sexual  dysfunction,     No results found for: HGBA1C  Lab Results   Component Value Date    LDLCALC 129 2018    CREATININE 0.59 (L) 10/23/2017     Patient Active Problem List   Diagnosis     Hypertension     Osteoporosis     Mixed hyperlipidemia     Herpes Simplex     Avascular Necrosis     Generalized anxiety disorder     Esophageal Reflux     Nephrolithiasis     Benzodiazepine dependence (H)     Drug-seeking behavior     C2 cervical fracture (H)     Closed fracture of sternum, unspecified portion of sternum, initial encounter     ONEIDA (generalized anxiety disorder)     Mood disorder due to a general medical condition     Insomnia     Family History   Problem Relation Age of Onset     COPD Mother      Lung cancer Brother      COPD Father       age 84     Hypertension Sister      Breast cancer Cousin      Social History     Social History     Marital status:      Spouse name: N/A     Number of children: N/A     Years of education: N/A     Social History Main Topics     Smoking status: Current Every Day Smoker     Packs/day: 0.50     Years: 45.00     Types: Cigarettes     Start date: 1970     Smokeless tobacco: Current User      Comment: Green smoking cessation folder given     Alcohol use No     Drug use: No     Sexual activity: Not Currently     Other Topics Concern     None     Social History Narrative    She is single and is 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  She has 2 children and 4 grandchildren.     Past Surgical History:   Procedure Laterality Date     OOPHORECTOMY       NV EVACUATE MOLE OF UTERUS      Description: Dilation And Curettage For Hydatidiform Mole;  Recorded: 2008;     NV REMOVE TONSILS/ADENOIDS,<13 Y/O      Description: Tonsillectomy With Adenoidectomy;  Recorded: 2008;     NV TOTAL ABDOM HYSTERECTOMY  1986    Description: Total Abdominal Hysterectomy;  Recorded: 2010;     NV TOTAL HIP ARTHROPLASTY Bilateral     Description: Total Hip  Replacement;  Recorded: 09/08/2008;  Comments: for AVN; Lt. 5/07 and had to be redone because of Fx; Rt. done 8/08     VERTEBROPLASTY  12/2015    T4     WRIST FRACTURE SURGERY Left 5/2015     Current Outpatient Prescriptions on File Prior to Visit   Medication Sig Dispense Refill     albuterol (VENTOLIN HFA) 90 mcg/actuation inhaler INHALE 1 TO 2 PUFFS BY MOUTH EVERY 4 TO 6 HOURS AS NEEDED 36 g 4     budesonide-formoterol (SYMBICORT) 160-4.5 mcg/actuation inhaler Inhale 2 puffs 2 (two) times a day. 1 Inhaler 12     docosanol (ABREVA) 10 % Crea Apply as needed to cold sore 2 g 2     ergocalciferol (VITAMIN D2) 50,000 unit capsule TAKE 1 CAPSULE BY MOUTH ONCE A WEEK 12 capsule 5     hydrOXYzine pamoate (VISTARIL) 25 MG capsule TAKE 2 CAPSULES BY MOUTH THREE TIMES DAILY AS NEEDED FOR ANXIETY (USE  AS  DIRECTED) 180 capsule 0     levoFLOXacin (LEVAQUIN) 500 MG tablet Take 1 tablet (500 mg total) by mouth daily for 7 days. 7 tablet 0     metoprolol tartrate (LOPRESSOR) 100 MG tablet Take 1 tablet (100 mg total) by mouth 2 (two) times a day. 180 tablet 3     omeprazole (PRILOSEC) 20 MG capsule Take 1 capsule (20 mg total) by mouth 2 (two) times a day before meals. 180 capsule 3     QUEtiapine (SEROQUEL) 100 MG tablet Take 1-2 tablets (100-200 mg total) by mouth at bedtime as needed, may repeat once. Max three tabs daily 60 tablet 5     risperiDONE (RISPERDAL) 1 MG tablet        spironolactone-hydrochlorothiazide (ALDACTAZIDE) 25-25 mg tablet Take 1 tablet by mouth daily as needed (swelling). 90 tablet 3     varenicline (CHANTIX) 1 mg tablet Take 1 tab by mouth two times a day. Take after eating with a full glass of water. NOTE: Dispense as maintenance for refills only. 60 tablet 2     venlafaxine (EFFEXOR-XR) 150 MG 24 hr capsule Take 2 capsules (300 mg total) by mouth daily. 180 capsule prn     [DISCONTINUED] alendronate (FOSAMAX) 70 MG tablet Take 1 tablet (70 mg total) by mouth every 7 days. Take once a week on and  empty stomach. 13 tablet prn     baclofen (LIORESAL) 10 MG tablet Take 1 tablet (10 mg total) by mouth 4 (four) times a day as needed (muscle spasm). 120 tablet 3     ibuprofen (ADVIL,MOTRIN) 800 MG tablet Take 1 tablet (800 mg total) by mouth every 6 (six) hours as needed for pain. 90 tablet 0     thiamine 100 MG tablet Take 1 tablet (100 mg total) by mouth daily.  0     [DISCONTINUED] gabapentin (NEURONTIN) 300 MG capsule        [DISCONTINUED] mirtazapine (REMERON SOL-TAB) 15 MG disintegrating tablet Take 1 tablet (15 mg total) by mouth at bedtime. 30 tablet prn     [DISCONTINUED] predniSONE (DELTASONE) 20 MG tablet Take 2 tablets (40 mg total) by mouth daily for 5 days. 10 tablet 0     [DISCONTINUED] prochlorperazine (COMPAZINE) 10 MG tablet Take 10 mg by mouth every 6 (six) hours as needed for nausea.        [DISCONTINUED] prochlorperazine (COMPAZINE) 10 MG tablet TAKE 1 TABLET BY MOUTH EVERY 6 HOURS AS NEEDED FOR NAUSEA 30 tablet 0     [DISCONTINUED] risperiDONE (RISPERDAL) 0.5 MG tablet        [DISCONTINUED] tiotropium (SPIRIVA WITH HANDIHALER) 18 mcg inhalation capsule Place 1 capsule (2 puffs total) into inhaler and inhale daily. 30 capsule 2     [DISCONTINUED] tiotropium bromide (SPIRIVA RESPIMAT) 2.5 mcg/actuation Mist Inhale 2 puffs daily. 4 g 6     [DISCONTINUED] varenicline (CHANTIX STARTING MONTH BOX) 0.5 mg (11)- 1 mg (42) tablet 1 wk before you stop smoking take 0.5mg daily on days 1-3, 0.5mg 2 times each day on days 4-7, then 1mg 2 times daily. 53 tablet 0     No current facility-administered medications on file prior to visit.      Allergies   Allergen Reactions     Niacin Preparations Itching     Penicillins Itching     Pt states itching and welts as reaction.     Nitrofurantoin Monohyd/M-Cryst Other (See Comments)     Reaction unknown     Adhesive Tape-Silicones Hives     OB History      Para Term  AB Living    2 2 2       SAB TAB Ectopic Multiple Live Births                I have  reviewed the patient's medical history in detail and updated the computerized patient record.  OBJECTIVE:  Wt Readings from Last 3 Encounters:   10/30/18 115 lb 9.6 oz (52.4 kg)   04/25/18 112 lb 9.6 oz (51.1 kg)   01/25/18 (!) 96 lb 11.2 oz (43.9 kg)     Temp Readings from Last 3 Encounters:   01/08/18 98.6  F (37  C)   12/26/17 97.7  F (36.5  C) (Oral)   11/03/17 98.6  F (37  C) (Oral)     BP Readings from Last 3 Encounters:   10/30/18 132/80   04/25/18 124/58   01/25/18 122/60     Pulse Readings from Last 3 Encounters:   10/30/18 83   04/25/18 75   01/25/18 66     Body mass index is 19.84 kg/(m^2).     Alert, cooperative, well-hydrated. Appears well.  Eyes: Pupils equal, round, reactive to light.  HEENT: Sclera white, nares patent, MMM, TM's pearly bilaterally  Neck: supple, without lymphadenopathy, Thyroid freely movable and without hypotrophy or nodularity.   Lungs: Clear to auscultation. No retractions, no increased work of respiration, equal chest rise.   Heart: Regular rate and rhythm, no murmurs, clicks,   Gallops.  Abdomen: Soft, bowel sounds in 4 quadrants with no tenderness to palpation, no organomegaly or masses, no aortic or renal bruits.  Extremities: no tenderness to palpation of gastrocnemius, bilaterally.  Skin: no increased warmth, edema, or erythema of lower legs bilaterally.  Back: No cervical, thoracic or lumbar tenderness to spinous processes or musculature.  Kyphotic curve very pronounced.  Neuro::pupils equal and reactive to light bilaterally, CN II - XII grossly intact. No focal motor/sensory deficits.  No visits with results within 3 Month(s) from this visit.  Latest known visit with results is:    Office Visit on 01/25/2018   Component Date Value     Amphetamines 01/25/2018 Screen Negative      Benzodiazepines 01/25/2018 Screen Negative      Opiates 01/25/2018 Screen Negative      Phencyclidine 01/25/2018 Screen Negative      THC 01/25/2018 Screen Negative      Barbiturates 01/25/2018  Screen Positive (Confirmation available on request)*     Cocaine Metabolite 01/25/2018 Screen Negative      Methadone 01/25/2018 Screen Negative      Oxycodone 01/25/2018 Screen Negative      Creatinine, Urine 01/25/2018 142.4      Cholesterol 01/25/2018 205*     Triglycerides 01/25/2018 97      HDL Cholesterol 01/25/2018 57      LDL Calculated 01/25/2018 129      Patient Fasting > 8hrs? 01/25/2018 Yes      ASSESMENT/PLAN:  1. Age-related osteoporosis with current pathological fracture  alendronate (FOSAMAX) 70 MG tablet   2. Encounter for long-term (current) use of medications  ALT (SGPT)   3. Benign essential hypertension  Basic Metabolic Panel    HM1(CBC and Differential)   Advised to discuss her antipsychotic medications with her nurse practitioner and determine whether she can continue on both Seroquel and Risperdal.  There is also concern with her being on any Tylenol and codeine medications because of her antipsychotic and anti-depressive medications.  Like her to try a topical diclofenac, but there is none approved by her insurance.  Advised to use Tylenol alternating with ibuprofen, there is still some risk to this since she would be getting Tylenol and this is also processed through the liver.  Will check liver and kidney function tests and will follow-up in 3 months.  Tries not to smoke while on Chantix explained to her how Chantix works and that that her smoking with the Chantix is just increasing her mix it nicotine sensation and increasing her risk for cardiac issues.  She will work on stopping smoking, right now she has no refills of Chantix.  Is able to completely stop smoking on the Chantix I would consider increasing her refills for 1 or 2 months.  There is already a concern with her history of mood disorder and this medication.  Paola Soria, MS, PA-C 10/30/18

## 2021-06-21 NOTE — PROGRESS NOTES
Result is/are normal.  Continue to work on smoking cessation as results are consistent with smoking related changes in the lungs.  Call if any questions or concerns.

## 2021-06-22 NOTE — PROGRESS NOTES
[facility]      Facility:    [facility] [[id number]]  Code Status: UNKNOWN      Chief Complaint/Reason for Visit:  Chief Complaint   Patient presents with     H & P     Mechanical fall with proximal right humerus fracture and status post ORIF, acute renal failure in the hospital as well as hyperkalemia as well as traumatic rhabdomyolysis.  Pain management acute blood loss anemia, anxiety disorder, hyponatremia, hypoglycemia, history of COPD, moderate protein calorie malnutrition, essential hypertension.       HPI:   Yaneth is a 68 y.o. female who was recently admitted to the hospital on 1/3/2018.  She does have a past medical history of smoking osteoporosis hypertension hyperlipidemia and COPD and anxiety.  She did have a fall at home which was a mechanical fall and there was no head injury.  She is brought to the emergency room and x-ray of the shoulder revealed a proximal right humerus fracture.  She was seen by orthopedic surgery and underwent ORIF without any perioperative or postoperative complications.  She did have acute renal failure however which did improve and resolve with IV fluids baseline creatinine 0.6.  She did have hyperkalemia that did resolve and also traumatic rhabdomyolysis with a CK level of 807 that came down to 247.  Right shoulder pain was treated with IV Dilaudid and oxycodone and pain was in adequate control as reported.  She did have an acute blood loss anemia and likely delusional from IV fluids and the no signs of any bleeding.  Her hyponatremia did resolve and she did have low blood sugars secondary to poor oral intake and she does have moderate protein calorie malnutrition as well as COPD which she did not have an exacerbation.  She was treated appropriately and then transferred here postoperatively in stable condition.    Is undergoing physical and occupational therapy without any issues at this time and claims her pain is tolerable with the  current pain regime.  She is moving her bowels adequately at this time and she is progressing as anticipated with physical and occupational therapy    Past Medical History:  Past Medical History:   Diagnosis Date     Anxiety      Distal radius fracture 2015    Left Distal (ace wrapped on admission). Fell when climbing stairs     Endometriosis      Fall 2015     Gastritis      GERD (gastroesophageal reflux disease)      History of kidney stones      Hyperlipidemia      Hypertension      Insomnia      Left elbow fracture 2015     Osteoporosis            Surgical History:  Past Surgical History:   Procedure Laterality Date     OOPHORECTOMY       AK EVACUATE MOLE OF UTERUS      Description: Dilation And Curettage For Hydatidiform Mole;  Recorded: 2008;     AK REMOVE TONSILS/ADENOIDS,<15 Y/O      Description: Tonsillectomy With Adenoidectomy;  Recorded: 2008;     AK TOTAL ABDOM HYSTERECTOMY  1986    Description: Total Abdominal Hysterectomy;  Recorded: 2010;     AK TOTAL HIP ARTHROPLASTY Bilateral     Description: Total Hip Replacement;  Recorded: 2008;  Comments: for AVN; Lt.  and had to be redone because of Fx; Rt. done      VERTEBROPLASTY  2015    T4     WRIST FRACTURE SURGERY Left 2015       Family History:   Family History   Problem Relation Age of Onset     COPD Mother      Lung cancer Brother      COPD Father          age 84     Hypertension Sister      Breast cancer Cousin        Social History:    Social History     Socioeconomic History     Marital status:      Spouse name: None     Number of children: None     Years of education: None     Highest education level: None   Social Needs     Financial resource strain: None     Food insecurity - worry: None     Food insecurity - inability: None     Transportation needs - medical: None     Transportation needs - non-medical: None   Occupational History     None   Tobacco Use     Smoking status: Current  Every Day Smoker     Packs/day: 0.50     Years: 45.00     Pack years: 22.50     Types: Cigarettes     Start date: 8/4/1970     Smokeless tobacco: Current User     Tobacco comment: Green smoking cessation folder given   Substance and Sexual Activity     Alcohol use: No     Drug use: No     Sexual activity: Not Currently   Other Topics Concern     None   Social History Narrative    She is single and 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  She has 2 children and 4 grandchildren.          Review of Systems   Constitutional:        Patient claims her pain is under adequate control however denies any fevers chills nausea vomiting diarrhea change in vision hearing taste or smell weakness one-sided chest pain shortness of breath she is moving her bowels adequately and there is no urgency frequency burning with urination polyphagia polydipsia polyuria depression or anxiety and the remainder the review of systems is negative patient claims her appetite is good.       Vitals:    01/09/19 0925   BP: 162/79   Pulse: 78   Resp: 20   Temp: 97.6  F (36.4  C)   SpO2: 95%       Physical Exam   Constitutional: No distress.   HENT:   Head: Atraumatic.   Nose: Nose normal.   Mouth/Throat: Oropharynx is clear and moist.   Eyes: Conjunctivae are normal. Right eye exhibits no discharge. Left eye exhibits no discharge.   Neck: Neck supple. No thyromegaly present.   Cardiovascular: Normal rate and regular rhythm.   Pulmonary/Chest: Effort normal. She has wheezes. She has no rales.   Abdominal: Soft. Bowel sounds are normal. She exhibits no distension. There is no tenderness. There is no rebound.   Musculoskeletal: She exhibits no edema or tenderness.   Neurological: She is alert. No cranial nerve deficit. She exhibits normal muscle tone.   Skin: Skin is warm and dry. She is not diaphoretic.   Psychiatric: She has a normal mood and affect. Her behavior is normal.       Medication List:  Current Outpatient Medications    Medication Sig     acetaminophen (TYLENOL) 325 MG tablet Take 2 tablets (650 mg total) by mouth daily as needed.     acetaminophen (TYLENOL) 500 MG tablet Take 2 tablets (1,000 mg total) by mouth 3 (three) times a day.     albuterol (VENTOLIN HFA) 90 mcg/actuation inhaler INHALE 1 TO 2 PUFFS BY MOUTH EVERY 4 TO 6 HOURS AS NEEDED     alendronate (FOSAMAX) 70 MG tablet Take 70 mg by mouth every 7 days. On Thursday. Take in the morning on an empty stomach with a full glass of water 30 minutes before food     aspirin 325 MG tablet Take 1 tablet (325 mg total) by mouth daily with breakfast.     bisacodyl (DULCOLAX) 10 mg suppository One supp MO qday pRN constipation (no BM x preceding 48 hrs)     budesonide-formoterol (SYMBICORT) 160-4.5 mcg/actuation inhaler Inhale 2 puffs 2 (two) times a day.     docosanol (ABREVA) 10 % Crea Apply 1 application topically 5 x daily PRN (for cold sores).     docusate sodium (COLACE) 100 MG capsule Take 1 capsule (100 mg total) by mouth 2 (two) times a day.     ergocalciferol (VITAMIN D2) 50,000 unit capsule TAKE 1 CAPSULE BY MOUTH ONCE A WEEK (Patient taking differently: Take 50,000 Units by mouth once a week. On Thursday      )     gabapentin (NEURONTIN) 100 MG capsule Take 100 mg by mouth at bedtime.     LORazepam (ATIVAN) 0.5 MG tablet Take 1-2 tablets (0.5-1 mg total) by mouth at bedtime as needed.     metoprolol tartrate (LOPRESSOR) 100 MG tablet Take 1 tablet (100 mg total) by mouth 2 (two) times a day.     nicotine (NICODERM CQ) 14 mg/24 hr Place 1 patch on the skin daily.     omeprazole (PRILOSEC) 20 MG capsule Take 1 capsule (20 mg total) by mouth 2 (two) times a day before meals.     oxyCODONE (ROXICODONE) 5 MG immediate release tablet Take 1-2 tablets (5-10 mg total) by mouth every 3 (three) hours as needed.     polyethylene glycol (MIRALAX) 17 gram packet Take 1 packet (17 g total) by mouth daily.     QUEtiapine (SEROQUEL) 100 MG tablet Take 2 tablets (200 mg total) by  mouth at bedtime.     risperiDONE (RISPERDAL) 1 MG tablet Take 1 tablet (1 mg total) by mouth 3 (three) times a day.     senna-docusate (PERICOLACE) 8.6-50 mg tablet Take 1 tablet by mouth 2 (two) times a day.     venlafaxine (EFFEXOR-XR) 150 MG 24 hr capsule Take 2 capsules (300 mg total) by mouth daily.       Labs: Hospital labs are as follows sodium was 132 up to 145 potassium was 6.3 went down to 3.7, chloride is 106 up to 121, CO2 was 38 down to 21, hemoglobin is 9.6 down to 8.2, creatinine is 1.80 down to 0.68.  White count was 19.0, down to 10.8, and hemoglobin was at 13.5 down to 8.1.  Hematocrit was 43.9 down to 28.4 and platelets would went from 411 down to 84.  ALT and AST were within normal limits.      Assessment:    ICD-10-CM    1. History of right shoulder fracture Z87.81    2. History of open reduction and internal fixation (ORIF) procedure Z98.890    3. Pain management R52    4. Hyperkalemia E87.5    5. Traumatic rhabdomyolysis, subsequent encounter T79.6XXD    6. Hyponatremia E87.1    7. Acute blood loss anemia D62    8. History of COPD Z87.09    9. Moderate protein-calorie malnutrition (H) E44.0        Plan: Plan at this time tomorrow check creatinine kinase for rhabdomyolysis as well as a basic metabolic profile secondary decreased potassium and low sodium and acute kidney injury.  Registered dietitian will see for moderate protein malnutrition and blood pressure will be done every 12 hours over the weekend.  I will continue with physical and occupational therapy as ordered and no other changes to care plan at this time.  Care plan was reviewed and is appropriate.  Pain is under adequate control she will be no changes in that medication at this time.        Electronically signed by: Celso Lyon DO

## 2021-06-22 NOTE — ANESTHESIA PREPROCEDURE EVALUATION
Anesthesia Evaluation      Patient summary reviewed   No history of anesthetic complications     Airway   Mallampati: I  Neck ROM: full   Pulmonary    (+) COPD, shortness of breath, decreased breath sounds, a smoker                         Cardiovascular - normal exam  Exercise tolerance: < 4 METS  (+) hypertension, ,     (-) murmur  ECG reviewed  Rhythm: regular  Rate: normal,    no murmur      Neuro/Psych    (+) depression, anxiety/panic attacks,     Endo/Other    (+) arthritis,      GI/Hepatic/Renal    (+) GERD,   chronic renal disease,      Other findings: CHAR secondary to rhabdo. Prolonged expiratory phase      Dental                         Anesthesia Plan  Planned anesthetic: general endotracheal and peripheral nerve block    ASA 3   Induction: intravenous   Anesthetic plan and risks discussed with: patient  Anesthesia plan special considerations: antiemetics,   Post-op plan: routine recovery

## 2021-06-22 NOTE — ANESTHESIA CARE TRANSFER NOTE
Last vitals:   Vitals:    01/04/19 1410   BP: 113/56   Pulse: 92   Resp: 16   Temp: 37.7  C (99.8  F)   SpO2: 100%     Patient spontaneous RR, TV 300s, suctioned, following commands, extubated to facemask 10LPM, O2 sats 100%. VSS. Report to RN.    Patient's level of consciousness is drowsy  Spontaneous respirations: yes  Maintains airway independently: yes  Dentition unchanged: yes  Oropharynx: oropharynx clear of all foreign objects    QCDR Measures:  ASA# 20 - Surgical Safety Checklist: WHO surgical safety checklist completed prior to induction    PQRS# 430 - Adult PONV Prevention: 4558F - Pt received => 2 anti-emetic agents (different classes) preop & intraop  ASA# 8 - Peds PONV Prevention: NA - Not pediatric patient, not GA or 2 or more risk factors NOT present  PQRS# 424 - Bryanna-op Temp Management: 4559F - At least one body temp DOCUMENTED => 35.5C or 95.9F within required timeframe  PQRS# 426 - PACU Transfer Protocol: - Transfer of care checklist used  ASA# 14 - Acute Post-op Pain: ASA14B - Patient did NOT experience pain >= 7 out of 10

## 2021-06-22 NOTE — ANESTHESIA POSTPROCEDURE EVALUATION
Patient: Yaneth Holguin  OPEN REDUCTION INTERNAL FIXATION, FRACTURE, HUMERUS, PROXIMAL, RIGHT  Anesthesia type: general    Patient location: [facility] Surgical Floor  Last vitals:   Vitals:    01/04/19 1555   BP: 120/52   Pulse: 79   Resp: 16   Temp: 37.3  C (99.1  F)   SpO2: 97%     Post vital signs: stable  Level of consciousness: awake and responds to simple questions  Post-anesthesia pain: pain controlled  Post-anesthesia nausea and vomiting: no  Pulmonary: unassisted, return to baseline  Cardiovascular: stable and blood pressure at baseline  Hydration: adequate  Anesthetic events: no    QCDR Measures:  ASA# 11 - Bryanna-op Cardiac Arrest: ASA11B - Patient did NOT experience unanticipated cardiac arrest  ASA# 12 - Bryanna-op Mortality Rate: ASA12B - Patient did NOT die  ASA# 13 - PACU Re-Intubation Rate: ASA13B - Patient did NOT require a new airway mgmt  ASA# 10 - Composite Anes Safety: ASA10A - No serious adverse event    Additional Notes:

## 2021-06-22 NOTE — ANESTHESIA PROCEDURE NOTES
Peripheral Block    Patient location during procedure: pre-op  Start time: 1/4/2019 10:50 AM  End time: 1/4/2019 10:56 AM  post-op analgesia per surgeon order as noted in medical record  Staffing:  Performing  Anesthesiologist: Celso Chakraborty MD  Preanesthetic Checklist  Completed: patient identified, site marked, risks, benefits, and alternatives discussed, timeout performed, consent obtained, airway assessed, oxygen available, suction available, emergency drugs available and hand hygiene performed  Peripheral Block  Block type: brachial plexus, supraclavicular  Prep: ChloraPrep  Patient position: sitting  Patient monitoring: cardiac monitor, continuous pulse oximetry, heart rate and blood pressure  Laterality: right  Injection technique: ultrasound guided    Ultrasound used to visualize needle placement in proximity to nerve being blocked: yes   Permanent ultrasound image captured for medical record      Needle  Needle type: Stimuplex   Needle gauge: 20G  Needle length: 4 in  no peripheral nerve catheter placed  Assessment  Injection assessment: no difficulty with injection, negative aspiration for heme, no paresthesia on injection and incremental injection

## 2021-06-23 NOTE — PROGRESS NOTES
[facility] Patient Outreach:     Discharge Date::  1/23/19      Reason for TCU stay (discharge diagnosis)::  Fall with right wrist fx s/p ORIF, right humerus fx, rhabdomyolysis, acute blood loss anemia      Are you feeling better, the same or worse since your discharge?:  Patient is feeling better          As part of your discharge plan, did they discuss home care with you?: Yes        Have your seen them yet, or are they scheduled to visit?: Yes                Do you have any follow up visits scheduled with your PCP or Specialist?:  No          I'm glad to hear you're doing well and we want you to continue to do well. Your PCP would like to see you for a follow-up visit. Can we help set that up for your today?: No        (RN) Provided patient the PCP's phone number to call if they have any questions or concerns?: No (Patient knows the number.  )

## 2021-06-23 NOTE — TELEPHONE ENCOUNTER
FYI - Status Update  Who is Calling: Home Care  Update: Patient was opened to home care on 1/23/19. Patient declined OT, PT was only an evaluation, and patient refused further skilled nursing.  Okay to leave a detailed message?:  No return call needed

## 2021-06-23 NOTE — PROGRESS NOTES
[facility]    Facility:   [facility] [[id number]]   Code Status: FULL CODE      CHIEF COMPLAINT/REASON FOR VISIT:  Chief Complaint   Patient presents with     Follow Up     rehab, shoulder       HISTORY:      HPI: Yaneth is a 68 y.o. female who I had the chance to revisit with secondary to her most recent hospitalization January 3 - 2019 secondary to positive closed fracture of approximately right humerus status post ORIF.  She currently is in the sling and splint.  Good CMS to her right hand able to wiggle her fingers without any problems.  She is on a regular diet getting extra nutrient supplements.  For her pain she can have oxycodone as needed usually takes about 4-5 doses per day she is also on Tylenol thousand milligrams every 8 hours.  Also gabapentin at night.  For her moods she is on Seroquel Risperdal and does take Lorazepam at night as needed usually does take her doses.  Denies any bowel or bladder problems.  Appetite is picking up.  Making good progress.  Is on inhalers secondary to COPD no acute exacerbation.  She did not have any particular questions or orthopedic visit is .    Past Medical History:   Diagnosis Date     Anxiety      Distal radius fracture 2015    Left Distal (ace wrapped on admission). Fell when climbing stairs     Endometriosis      Fall 2015     Gastritis      GERD (gastroesophageal reflux disease)      History of kidney stones      Hyperlipidemia      Hypertension      Insomnia      Left elbow fracture 2015     Osteoporosis              Family History   Problem Relation Age of Onset     COPD Mother      Lung cancer Brother      COPD Father          age 84     Hypertension Sister      Breast cancer Cousin      Social History     Socioeconomic History     Marital status:      Spouse name: Not on file     Number of children: Not on file     Years of education: Not on file     Highest education  level: Not on file   Social Needs     Financial resource strain: Not on file     Food insecurity - worry: Not on file     Food insecurity - inability: Not on file     Transportation needs - medical: Not on file     Transportation needs - non-medical: Not on file   Occupational History     Not on file   Tobacco Use     Smoking status: Current Every Day Smoker     Packs/day: 0.50     Years: 45.00     Pack years: 22.50     Types: Cigarettes     Start date: 8/4/1970     Smokeless tobacco: Current User     Tobacco comment: Green smoking cessation folder given   Substance and Sexual Activity     Alcohol use: No     Drug use: No     Sexual activity: Not Currently   Other Topics Concern     Not on file   Social History Narrative    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  She has 2 children and 4 grandchildren.         Review of Systems  Denies chills and fever coughing wheezing chest pain dizziness or vertigo nausea vomiting diarrhea dysuria flulike symptoms headache stiff neck swollen glands rashes or sores.  History of COPD anxiety hypertension hyperlipidemia recent closed humeral fracture status post ORIF    Current Outpatient Medications   Medication Sig     acetaminophen (TYLENOL) 325 MG tablet Take 2 tablets (650 mg total) by mouth daily as needed.     acetaminophen (TYLENOL) 500 MG tablet Take 2 tablets (1,000 mg total) by mouth 3 (three) times a day.     albuterol (VENTOLIN HFA) 90 mcg/actuation inhaler INHALE 1 TO 2 PUFFS BY MOUTH EVERY 4 TO 6 HOURS AS NEEDED     alendronate (FOSAMAX) 70 MG tablet Take 70 mg by mouth every 7 days. On Thursday. Take in the morning on an empty stomach with a full glass of water 30 minutes before food     aspirin 325 MG tablet Take 1 tablet (325 mg total) by mouth daily with breakfast.     bisacodyl (DULCOLAX) 10 mg suppository One supp MS qday pRN constipation (no BM x preceding 48 hrs)     budesonide-formoterol (SYMBICORT) 160-4.5 mcg/actuation  inhaler Inhale 2 puffs 2 (two) times a day.     docosanol (ABREVA) 10 % Crea Apply 1 application topically 5 x daily PRN (for cold sores).     docusate sodium (COLACE) 100 MG capsule Take 1 capsule (100 mg total) by mouth 2 (two) times a day.     ergocalciferol (VITAMIN D2) 50,000 unit capsule TAKE 1 CAPSULE BY MOUTH ONCE A WEEK (Patient taking differently: Take 50,000 Units by mouth once a week. On Thursday      )     gabapentin (NEURONTIN) 100 MG capsule Take 100 mg by mouth at bedtime.     LORazepam (ATIVAN) 0.5 MG tablet Take 1-2 tablets (0.5-1 mg total) by mouth at bedtime as needed.     metoprolol tartrate (LOPRESSOR) 100 MG tablet Take 1 tablet (100 mg total) by mouth 2 (two) times a day.     nicotine (NICODERM CQ) 14 mg/24 hr Place 1 patch on the skin daily.     omeprazole (PRILOSEC) 20 MG capsule Take 1 capsule (20 mg total) by mouth 2 (two) times a day before meals.     oxyCODONE (ROXICODONE) 5 MG immediate release tablet Take 1-2 tablets (5-10 mg total) by mouth every 3 (three) hours as needed.     polyethylene glycol (MIRALAX) 17 gram packet Take 1 packet (17 g total) by mouth daily.     QUEtiapine (SEROQUEL) 100 MG tablet Take 2 tablets (200 mg total) by mouth at bedtime.     risperiDONE (RISPERDAL) 1 MG tablet Take 1 tablet (1 mg total) by mouth 3 (three) times a day.     senna-docusate (PERICOLACE) 8.6-50 mg tablet Take 1 tablet by mouth 2 (two) times a day.     venlafaxine (EFFEXOR-XR) 150 MG 24 hr capsule Take 2 capsules (300 mg total) by mouth daily.       .There were no vitals filed for this visit.  Blood pressure 106/56 pulse 80 respirations 18 temperature 98.1  Physical Exam   Constitutional: She is oriented to person, place, and time. No distress.   HENT:   Eyes: Pupils are equal, round, and reactive to light.   Neck: Neck supple. No thyromegaly present.   Cardiovascular: Normal rate, regular rhythm and normal heart sounds.   Pulmonary/Chest: Breath sounds normal.   Musculoskeletal:   Recent  closed humeral fracture status post ORIF.  Hydrocolloid dressing the right shoulder.  Sling and splint.  Good CMS to her right hand.  Pain is managed.   Lymphadenopathy:     She has no cervical adenopathy.   Neurological: She is alert and oriented to person, place, and time.   Skin: Skin is warm and dry. No rash noted.   Psychiatric: Her behavior is normal.    LABS:   Lab Results   Component Value Date    WBC 10.8 01/05/2019    HGB 8.1 (L) 01/07/2019    HCT 28.4 (L) 01/05/2019    MCV 92 01/05/2019     01/05/2019     Results for orders placed or performed in visit on 01/10/19   Basic Metabolic Panel   Result Value Ref Range    Sodium 146 (H) 136 - 145 mmol/L    Potassium 3.5 3.5 - 5.0 mmol/L    Chloride 113 (H) 98 - 107 mmol/L    CO2 25 22 - 31 mmol/L    Anion Gap, Calculation 8 5 - 18 mmol/L    Glucose 92 70 - 125 mg/dL    Calcium 9.0 8.5 - 10.5 mg/dL    BUN 8 8 - 22 mg/dL    Creatinine 0.61 0.60 - 1.10 mg/dL    GFR MDRD Af Amer >60 >60 mL/min/1.73m2    GFR MDRD Non Af Amer >60 >60 mL/min/1.73m2           ASSESSMENT:      ICD-10-CM    1. Closed fracture of proximal end of right humerus with delayed healing, unspecified fracture morphology, subsequent encounter S42.201G    2. ONEIDA (generalized anxiety disorder) F41.1    3. Hypertension I10    4. Mood disorder due to a general medical condition F06.30        PLAN:    No further changes at this time.  She does have a visit with orthopedics in January 23 she does have a BMP as well as hemoglobin ordered for today as well results not yet back by this dictation.  Continue medication and plan.      Electronically signed by: Michael Duane Johnson, CNP

## 2021-06-23 NOTE — PROGRESS NOTES
[facility]    Facility:   [facility] [[id number]]   Code Status: FULL CODE      CHIEF COMPLAINT/REASON FOR VISIT:  Chief Complaint   Patient presents with     Follow Up     rehab, genoveva       HISTORY:      HPI: Yaneth is a 68 y.o. female who I had the pleasure to revisit with secondary to her hospitalization  secondary to a closed fracture of the proximal right humerus status post ORIF.  She currently seems to be doing fine good CMS to her right hand.  Does have a sling and a splint.  Is performing in therapy.  She does see orthopedics on the .  For pain she can have oxycodone as needed usually does take them pretty frequently throughout the day of these 4-5 doses she also can have Tylenol as needed.  She has been normotensive and afebrile.  For sleep she is on Risperdal along with Seroquel and also does have Lorazepam as needed and she usually does take her 2 tabs at night.  The moods have been stable.  Appetite good no bowel or bladder problems getting extra nutrient supplements.  We also did a chance to talk about her wonderful bouquet of flowers in her room that is starting to lose its color fullness.    Past Medical History:   Diagnosis Date     Anxiety      Distal radius fracture 2015    Left Distal (ace wrapped on admission). Fell when climbing stairs     Endometriosis      Fall 2015     Gastritis      GERD (gastroesophageal reflux disease)      History of kidney stones      Hyperlipidemia      Hypertension      Insomnia      Left elbow fracture 2015     Osteoporosis              Family History   Problem Relation Age of Onset     COPD Mother      Lung cancer Brother      COPD Father          age 84     Hypertension Sister      Breast cancer Cousin      Social History     Socioeconomic History     Marital status:      Spouse name: Not on file     Number of children: Not on file     Years of education: Not on  file     Highest education level: Not on file   Social Needs     Financial resource strain: Not on file     Food insecurity - worry: Not on file     Food insecurity - inability: Not on file     Transportation needs - medical: Not on file     Transportation needs - non-medical: Not on file   Occupational History     Not on file   Tobacco Use     Smoking status: Current Every Day Smoker     Packs/day: 0.50     Years: 45.00     Pack years: 22.50     Types: Cigarettes     Start date: 8/4/1970     Smokeless tobacco: Current User     Tobacco comment: Green smoking cessation folder given   Substance and Sexual Activity     Alcohol use: No     Drug use: No     Sexual activity: Not Currently   Other Topics Concern     Not on file   Social History Narrative    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  She has 2 children and 4 grandchildren.         Review of Systems  Denies chills and fever coughing wheezing chest pain dizziness or vertigo nausea vomiting diarrhea dysuria flulike symptoms headache stiff neck swollen glands rashes or sores.  History of COPD anxiety hypertension hyperlipidemia recent closed humeral fracture status post ORIF       Current Outpatient Medications   Medication Sig     acetaminophen (TYLENOL) 325 MG tablet Take 2 tablets (650 mg total) by mouth daily as needed.     acetaminophen (TYLENOL) 500 MG tablet Take 2 tablets (1,000 mg total) by mouth 3 (three) times a day.     albuterol (VENTOLIN HFA) 90 mcg/actuation inhaler INHALE 1 TO 2 PUFFS BY MOUTH EVERY 4 TO 6 HOURS AS NEEDED     alendronate (FOSAMAX) 70 MG tablet Take 70 mg by mouth every 7 days. On Thursday. Take in the morning on an empty stomach with a full glass of water 30 minutes before food     aspirin 325 MG tablet Take 1 tablet (325 mg total) by mouth daily with breakfast.     bisacodyl (DULCOLAX) 10 mg suppository One supp [place] qday pRN constipation (no BM x preceding 48 hrs)     budesonide-formoterol  (SYMBICORT) 160-4.5 mcg/actuation inhaler Inhale 2 puffs 2 (two) times a day.     docosanol (ABREVA) 10 % Crea Apply 1 application topically 5 x daily PRN (for cold sores).     docusate sodium (COLACE) 100 MG capsule Take 1 capsule (100 mg total) by mouth 2 (two) times a day.     ergocalciferol (VITAMIN D2) 50,000 unit capsule TAKE 1 CAPSULE BY MOUTH ONCE A WEEK (Patient taking differently: Take 50,000 Units by mouth once a week. On Thursday      )     gabapentin (NEURONTIN) 100 MG capsule Take 100 mg by mouth at bedtime.     LORazepam (ATIVAN) 0.5 MG tablet Take 1-2 tablets (0.5-1 mg total) by mouth at bedtime as needed.     metoprolol tartrate (LOPRESSOR) 100 MG tablet Take 1 tablet (100 mg total) by mouth 2 (two) times a day.     nicotine (NICODERM CQ) 14 mg/24 hr Place 1 patch on the skin daily.     omeprazole (PRILOSEC) 20 MG capsule Take 1 capsule (20 mg total) by mouth 2 (two) times a day before meals.     oxyCODONE (ROXICODONE) 5 MG immediate release tablet Take 1-2 tablets (5-10 mg total) by mouth every 3 (three) hours as needed.     polyethylene glycol (MIRALAX) 17 gram packet Take 1 packet (17 g total) by mouth daily.     QUEtiapine (SEROQUEL) 100 MG tablet Take 2 tablets (200 mg total) by mouth at bedtime.     risperiDONE (RISPERDAL) 1 MG tablet Take 1 tablet (1 mg total) by mouth 3 (three) times a day.     senna-docusate (PERICOLACE) 8.6-50 mg tablet Take 1 tablet by mouth 2 (two) times a day.     venlafaxine (EFFEXOR-XR) 150 MG 24 hr capsule Take 2 capsules (300 mg total) by mouth daily.       .There were no vitals filed for this visit.  Blood pressure 133/62 pulse 80 temperature 97.5 saturation room air 92%  Physical Exam    Constitutional: She is oriented to person, place, and time. No distress.   HENT:   Neck: Neck supple. No thyromegaly present.   Cardiovascular: Normal rate, regular rhythm and normal heart sounds.   Pulmonary/Chest: Breath sounds normal.   Musculoskeletal:   Recent closed humeral  fracture status post ORIF.  Hydrocolloid dressing the right shoulder.  Sling and splint.  Good CMS to her right hand.  Pain is managed.   Neurological: She is alert and oriented to person, place, and time.   Skin: Skin is warm and dry. No rash noted.   Psychiatric: Her behavior is normal.     LABS:   Lab Results   Component Value Date    WBC 10.8 01/05/2019    HGB 8.9 (L) 01/14/2019    HCT 28.4 (L) 01/05/2019    MCV 92 01/05/2019     01/05/2019     Results for orders placed or performed in visit on 01/14/19   Basic Metabolic Panel   Result Value Ref Range    Sodium 142 136 - 145 mmol/L    Potassium 3.8 3.5 - 5.0 mmol/L    Chloride 111 (H) 98 - 107 mmol/L    CO2 22 22 - 31 mmol/L    Anion Gap, Calculation 9 5 - 18 mmol/L    Glucose 79 70 - 125 mg/dL    Calcium 8.7 8.5 - 10.5 mg/dL    BUN 12 8 - 22 mg/dL    Creatinine 0.68 0.60 - 1.10 mg/dL    GFR MDRD Af Amer >60 >60 mL/min/1.73m2    GFR MDRD Non Af Amer >60 >60 mL/min/1.73m2           ASSESSMENT:      ICD-10-CM    1. Closed fracture of proximal end of right humerus with delayed healing, unspecified fracture morphology, subsequent encounter S42.201G    2. Herpes Simplex B00.9    3. Hypertension I10    4. Mood disorder due to a general medical condition F06.30        PLAN:    At this time continue to manage and follow.  See her laboratory studies as listed above.  Her pain seems to be well managed.  Does see orthopedics on January 23.      Electronically signed by: Michael Duane Johnson, CNP

## 2021-06-23 NOTE — PROGRESS NOTES
[facility]    Facility:   [facility] [[id number]]   Code Status: FULL CODE      CHIEF COMPLAINT/REASON FOR VISIT:  Chief Complaint   Patient presents with     Follow Up     rehab, shoulder       HISTORY:      HPI: Yaneth is a 68 y.o. female who I had the pleasure to visit with secondary to her most recent hospitalization January 3 - January 8, 2019 secondary to a fall sustaining a closed fracture of the proximal end of the right wrist with delayed healing status post ORIF.  Workup did include a right shoulder x-ray which did show a mildly displaced fracture of the surgical neck of the proximal right humerus with anterior displacement of the shaft in the last and the humeral head is rotated posteriorly but does maintain contact with the glenoid.  The initial laboratory studies did show sodium of 134 potassium 6.3 BUN 38 creatinine 1.80 her initial white cells were 19 hemoglobin 13.5 hemoglobin eventually did drop down on January 7 8.1.  Regarding her right proximal humeral fracture with anterior displacement did have a ORIF he also did have acute renal failure which required IV fluids she also did have hyperkalemia which did resolve and the most current potassium on January 7 3.7.  She did have traumatic rhabdomyolysis the creatinine level was at 807 and did come down to 247 she also did have lymphocytosis secondary to a fall.  She did have acute blood loss anemia hemoglobin did drop we will recheck her hemoglobin on the transitional care unit.  She is a smoker.  Also has anxiety disorder he does take Risperdal and Seroquel also does take Lorazepam for sleep.  She also had hypoglycemia secondary to poor p.o. intake.  Also moderate protein calorie malnutrition she did consult with dietitian and also continue to evaluate her blood pressures.  She currently is on the transitional care unit had a chance to visit with her regarding her overall care and she states she is doing  pretty good at this time no particular complaints she does take Tylenol every 8 hours thousand milligrams she also can have oxycodone as needed 5 or 10 mg he does take about 3 doses per day also is on gabapentin 100 mg at bedtime.  She has been normotensive and afebrile and also on room air.  Did have Accu-Cheks performed most of the blood sugars have been less than 120 there was one at 173 so we will discontinue the Accu-Cheks.  No longer requiring oxygen will discontinue the oxygen.  Does take Lorazepam at night she does not want to make any changes from his medication usually takes a 4 mg tab.  No heartburn reflux is on omeprazole.  Also is on vitamin D2 50,000 units weekly.  Moods have been stable.  Appetite slowly improving currently does have a sling and splint.    Past Medical History:   Diagnosis Date     Anxiety      Distal radius fracture 2015    Left Distal (ace wrapped on admission). Fell when climbing stairs     Endometriosis      Fall 2015     Gastritis      GERD (gastroesophageal reflux disease)      History of kidney stones      Hyperlipidemia      Hypertension      Insomnia      Left elbow fracture 2015     Osteoporosis              Family History   Problem Relation Age of Onset     COPD Mother      Lung cancer Brother      COPD Father          age 84     Hypertension Sister      Breast cancer Cousin      Social History     Socioeconomic History     Marital status:      Spouse name: Not on file     Number of children: Not on file     Years of education: Not on file     Highest education level: Not on file   Social Needs     Financial resource strain: Not on file     Food insecurity - worry: Not on file     Food insecurity - inability: Not on file     Transportation needs - medical: Not on file     Transportation needs - non-medical: Not on file   Occupational History     Not on file   Tobacco Use     Smoking status: Current Every Day Smoker     Packs/day: 0.50     Years:  45.00     Pack years: 22.50     Types: Cigarettes     Start date: 8/4/1970     Smokeless tobacco: Current User     Tobacco comment: Green smoking cessation folder given   Substance and Sexual Activity     Alcohol use: No     Drug use: No     Sexual activity: Not Currently   Other Topics Concern     Not on file   Social History Narrative    She is single and 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  She has 2 children and 4 grandchildren.         Review of Systems  Denies chills and fever coughing wheezing chest pain dizziness or vertigo nausea vomiting diarrhea dysuria flulike symptoms headache stiff neck swollen glands rashes or sores.  History of COPD anxiety hypertension hyperlipidemia recent closed humeral fracture status post ORIF  Current Outpatient Medications   Medication Sig     acetaminophen (TYLENOL) 325 MG tablet Take 2 tablets (650 mg total) by mouth daily as needed.     acetaminophen (TYLENOL) 500 MG tablet Take 2 tablets (1,000 mg total) by mouth 3 (three) times a day.     albuterol (VENTOLIN HFA) 90 mcg/actuation inhaler INHALE 1 TO 2 PUFFS BY MOUTH EVERY 4 TO 6 HOURS AS NEEDED     alendronate (FOSAMAX) 70 MG tablet Take 70 mg by mouth every 7 days. On Thursday. Take in the morning on an empty stomach with a full glass of water 30 minutes before food     aspirin 325 MG tablet Take 1 tablet (325 mg total) by mouth daily with breakfast.     bisacodyl (DULCOLAX) 10 mg suppository One supp [place] qday pRN constipation (no BM x preceding 48 hrs)     budesonide-formoterol (SYMBICORT) 160-4.5 mcg/actuation inhaler Inhale 2 puffs 2 (two) times a day.     docosanol (ABREVA) 10 % Crea Apply 1 application topically 5 x daily PRN (for cold sores).     docusate sodium (COLACE) 100 MG capsule Take 1 capsule (100 mg total) by mouth 2 (two) times a day.     ergocalciferol (VITAMIN D2) 50,000 unit capsule TAKE 1 CAPSULE BY MOUTH ONCE A WEEK (Patient taking differently: Take 50,000 Units by mouth once a  week. On Thursday      )     gabapentin (NEURONTIN) 100 MG capsule Take 100 mg by mouth at bedtime.     LORazepam (ATIVAN) 0.5 MG tablet Take 1-2 tablets (0.5-1 mg total) by mouth at bedtime as needed.     metoprolol tartrate (LOPRESSOR) 100 MG tablet Take 1 tablet (100 mg total) by mouth 2 (two) times a day.     nicotine (NICODERM CQ) 14 mg/24 hr Place 1 patch on the skin daily.     omeprazole (PRILOSEC) 20 MG capsule Take 1 capsule (20 mg total) by mouth 2 (two) times a day before meals.     oxyCODONE (ROXICODONE) 5 MG immediate release tablet Take 1-2 tablets (5-10 mg total) by mouth every 3 (three) hours as needed.     polyethylene glycol (MIRALAX) 17 gram packet Take 1 packet (17 g total) by mouth daily.     QUEtiapine (SEROQUEL) 100 MG tablet Take 2 tablets (200 mg total) by mouth at bedtime.     risperiDONE (RISPERDAL) 1 MG tablet Take 1 tablet (1 mg total) by mouth 3 (three) times a day.     senna-docusate (PERICOLACE) 8.6-50 mg tablet Take 1 tablet by mouth 2 (two) times a day.     venlafaxine (EFFEXOR-XR) 150 MG 24 hr capsule Take 2 capsules (300 mg total) by mouth daily.       .There were no vitals filed for this visit.  Blood pressure 132/75 pulse 77 temperature 98.6 saturation room air 96%  Physical Exam   Constitutional: She is oriented to person, place, and time. No distress.   HENT:   Head: Normocephalic.   Eyes: Pupils are equal, round, and reactive to light.   Neck: Neck supple. No thyromegaly present.   Cardiovascular: Normal rate, regular rhythm and normal heart sounds.   Pulmonary/Chest: Breath sounds normal.   Abdominal: Bowel sounds are normal. There is no tenderness. There is no guarding.   Musculoskeletal:   Recent closed humeral fracture status post ORIF.  Hydrocolloid dressing the right shoulder.  Sling and splint.  Good CMS to her right hand.  Pain is managed.   Lymphadenopathy:     She has no cervical adenopathy.   Neurological: She is alert and oriented to person, place, and time.    Skin: Skin is warm and dry. No rash noted.   Psychiatric: Her behavior is normal.         LABS:   Lab Results   Component Value Date    WBC 10.8 01/05/2019    HGB 8.1 (L) 01/07/2019    HCT 28.4 (L) 01/05/2019    MCV 92 01/05/2019     01/05/2019     Results for orders placed or performed during the hospital encounter of 01/03/19   Basic Metabolic Panel   Result Value Ref Range    Sodium 145 136 - 145 mmol/L    Potassium 3.7 3.5 - 5.0 mmol/L    Chloride 121 (H) 98 - 107 mmol/L    CO2 21 (L) 22 - 31 mmol/L    Anion Gap, Calculation 3 (L) 5 - 18 mmol/L    Glucose 89 70 - 125 mg/dL    Calcium 8.2 (L) 8.5 - 10.5 mg/dL    BUN 14 8 - 22 mg/dL    Creatinine 0.68 0.60 - 1.10 mg/dL    GFR MDRD Af Amer >60 >60 mL/min/1.73m2    GFR MDRD Non Af Amer >60 >60 mL/min/1.73m2       No results found for: HGBA1C      ASSESSMENT:      ICD-10-CM    1. Closed fracture of proximal end of right humerus with delayed healing, unspecified fracture morphology, subsequent encounter S42.201G    2. Hypertension I10    3. Gait instability R26.81    4. Anemia associated with acute blood loss D62        PLAN:    Discontinue the oxygen discontinue the Accu-Cheks.  Her pain is also well managed.  Continue to manage and follow does see orthopedics on January 23.  She did not have any other further questions.    Electronically signed by: Michael Duane Johnson, CNP

## 2021-06-23 NOTE — PROGRESS NOTES
[facility]    Facility:   [facility] [[id number]]   Code Status: FULL CODE  PCP: Paola Soria PA-C   Phone: [phone]   Fax: [phone]      CHIEF COMPLAINT/REASON FOR VISIT:  Chief Complaint   Patient presents with     Discharge Summary       HISTORY COURSE:  Yaneth is a 68 y.o. female who I had the pleasure to visit with secondary to her most recent hospitalization January 3 - January 8, 2019 secondary to a fall sustaining a closed fracture of the proximal end of the right wrist with delayed healing status post ORIF.  Workup did include a right shoulder x-ray which did show a mildly displaced fracture of the surgical neck of the proximal right humerus with anterior displacement of the shaft in the last and the humeral head is rotated posteriorly but does maintain contact with the glenoid.  The initial laboratory studies did show sodium of 134 potassium 6.3 BUN 38 creatinine 1.80 her initial white cells were 19 hemoglobin 13.5 hemoglobin eventually did drop down on January 7 8.1.  Regarding her right proximal humeral fracture with anterior displacement did have a ORIF he also did have acute renal failure which required IV fluids she also did have hyperkalemia which did resolve and the most current potassium on January 7 3.7.  She did have traumatic rhabdomyolysis the creatinine level was at 807 and did come down to 247 she also did have lymphocytosis secondary to a fall.  She did have acute blood loss anemia hemoglobin did drop we will recheck her hemoglobin on the transitional care unit.  She is a smoker.  Also has anxiety disorder he does take Risperdal and Seroquel also does take Lorazepam for sleep.  She also had hypoglycemia secondary to poor p.o. intake.  Also moderate protein calorie malnutrition she did consult with dietitian and also continue to evaluate her blood pressures.  She has been participating in the rehabilitation services and actually has  made pretty good progress her strength and balance has significantly improved she still wears her sling and brace.  Good CMS to her right hand.  Has a hydrocolloid or Aquasol dressing over her right shoulder.  Hemoglobin did increase to 8.9.  She has been normotensive and afebrile and also on room air.  For pain she has been taking oxycodone either 5 or 10 we are now been a switch that to 5 mg every 3 hours and not 10 and she usually does take anywhere from 3-5 doses per day.  For nighttime rest and sleep she also can have the Lorazepam either 0.5 or 1 mg she takes the full milligrams tab before bedtime this is chronic for her.  Otherwise for pain she can take Tylenol thousand milligrams 3 times a day she is on stool softeners.  Omeprazole for reflux twice daily 20 mg.  Is also on Seroquel and Risperdal.  Getting extra nutrient supplements also regular diet.  Is also on Fosamax as well as vitamin D2 50,000 units weekly.  We did a chance to discuss her rehabilitation process and at this point she does see orthopedics on January 23 and does feel comfortable upon going home and she will also receive services.  Review of Systems  Denies chills and fever coughing wheezing chest pain dizziness or vertigo nausea vomiting diarrhea dysuria flulike symptoms headache stiff neck swollen glands rashes or sores.  History of COPD anxiety hypertension hyperlipidemia recent closed humeral fracture status post ORIF  Vitals:    01/21/19 0812   BP: 145/87   Pulse: 62   Resp: 16   Temp: 97.5  F (36.4  C)   SpO2: 93%       Physical Exam    Constitutional: She is oriented to person, place, and time. No distress.   HENT:   Neck: Neck supple. No thyromegaly present.   Cardiovascular: Normal rate, regular rhythm and normal heart sounds.   Pulmonary/Chest: Breath sounds normal.   Abdominal: Bowel sounds are normal. There is no tenderness.  Musculoskeletal:   Recent closed humeral fracture status post ORIF.  Hydrocolloid dressing the right  shoulder.  Sling and splint.  Good CMS to her right hand.  Pain is managed.   Lymphadenopathy:     She has no cervical adenopathy.   Neurological: She is alert and oriented to person, place, and time.   Skin: Skin is warm and dry. No rash noted.   Psychiatric: Her behavior is normal.   Lab Results   Component Value Date    WBC 10.8 01/05/2019    HGB 8.9 (L) 01/14/2019    HCT 28.4 (L) 01/05/2019    MCV 92 01/05/2019     01/05/2019     Results for orders placed or performed in visit on 01/14/19   Basic Metabolic Panel   Result Value Ref Range    Sodium 142 136 - 145 mmol/L    Potassium 3.8 3.5 - 5.0 mmol/L    Chloride 111 (H) 98 - 107 mmol/L    CO2 22 22 - 31 mmol/L    Anion Gap, Calculation 9 5 - 18 mmol/L    Glucose 79 70 - 125 mg/dL    Calcium 8.7 8.5 - 10.5 mg/dL    BUN 12 8 - 22 mg/dL    Creatinine 0.68 0.60 - 1.10 mg/dL    GFR MDRD Af Amer >60 >60 mL/min/1.73m2    GFR MDRD Non Af Amer >60 >60 mL/min/1.73m2       No results found for: HGBA1C    MEDICATION LIST:  Current Outpatient Medications   Medication Sig     acetaminophen (TYLENOL) 325 MG tablet Take 2 tablets (650 mg total) by mouth daily as needed.     acetaminophen (TYLENOL) 500 MG tablet Take 2 tablets (1,000 mg total) by mouth 3 (three) times a day.     albuterol (VENTOLIN HFA) 90 mcg/actuation inhaler INHALE 1 TO 2 PUFFS BY MOUTH EVERY 4 TO 6 HOURS AS NEEDED     alendronate (FOSAMAX) 70 MG tablet Take 70 mg by mouth every 7 days. On Thursday. Take in the morning on an empty stomach with a full glass of water 30 minutes before food     aspirin 325 MG tablet Take 1 tablet (325 mg total) by mouth daily with breakfast.     budesonide-formoterol (SYMBICORT) 160-4.5 mcg/actuation inhaler Inhale 2 puffs 2 (two) times a day.     docosanol (ABREVA) 10 % Crea Apply 1 application topically 5 x daily PRN (for cold sores).     docusate sodium (COLACE) 100 MG capsule Take 1 capsule (100 mg total) by mouth 2 (two) times a day.     ergocalciferol (VITAMIN D2)  50,000 unit capsule TAKE 1 CAPSULE BY MOUTH ONCE A WEEK (Patient taking differently: Take 50,000 Units by mouth once a week. On Thursday      )     gabapentin (NEURONTIN) 100 MG capsule Take 100 mg by mouth at bedtime.     LORazepam (ATIVAN) 0.5 MG tablet Take 1-2 tablets (0.5-1 mg total) by mouth at bedtime as needed.     metoprolol tartrate (LOPRESSOR) 100 MG tablet Take 1 tablet (100 mg total) by mouth 2 (two) times a day.     nicotine (NICODERM CQ) 14 mg/24 hr Place 1 patch on the skin daily.     omeprazole (PRILOSEC) 20 MG capsule Take 1 capsule (20 mg total) by mouth 2 (two) times a day before meals.     oxyCODONE (ROXICODONE) 5 MG immediate release tablet Take 1-2 tablets (5-10 mg total) by mouth every 3 (three) hours as needed. (Patient taking differently: Take 5 mg by mouth every 3 (three) hours as needed.       )     polyethylene glycol (MIRALAX) 17 gram packet Take 1 packet (17 g total) by mouth daily.     QUEtiapine (SEROQUEL) 100 MG tablet Take 2 tablets (200 mg total) by mouth at bedtime.     risperiDONE (RISPERDAL) 1 MG tablet Take 1 tablet (1 mg total) by mouth 3 (three) times a day.     senna-docusate (PERICOLACE) 8.6-50 mg tablet Take 1 tablet by mouth 2 (two) times a day.     venlafaxine (EFFEXOR-XR) 150 MG 24 hr capsule Take 2 capsules (300 mg total) by mouth daily.       DISCHARGE DIAGNOSIS:    ICD-10-CM    1. Hypertension I10    2. Closed fracture of proximal end of right humerus with delayed healing, unspecified fracture morphology, subsequent encounter S42.201G    3. ONEIDA (generalized anxiety disorder) F41.1    4. Gastroesophageal reflux disease, esophagitis presence not specified K21.9        MEDICAL EQUIPMENT NEEDS:  None    DISCHARGE PLAN/FACE TO FACE:  I certify that services are/were furnished while this patient was under the care of a physician and that a physician or an allowed non-physician practitioner (NPP), had a face-to-face encounter that meets the physician face-to-face encounter  requirements. The encounter was in whole, or in part, related to the primary reason for home health. The patient is confined to his/her home and needs intermittent skilled nursing, physical therapy, speech-language pathology, or the continued need for occupational therapy. A plan of care has been established by a physician and is periodically reviewed by a physician.  Date of Face-to-Face Encounter: January 21, 2019    I certify that, based on my findings, the following services are medically necessary home health services: She will be discharging to home on January 23, 2019 with current medications and narcotics will also have physical and occupational therapy home health aide and nursing.  The home care agency has not yet been identified.    My clinical findings support the need for the above skilled services because: (Please write a brief narrative summary that describes what the RN, PT, SLP, or other services will be doing in the home. A list of diagnoses in this section does not meet the CMS requirements.)  She will require the skilled services secondary to her recent humeral fracture status post ORIF along with her other chronic medical conditions home safety transfers and various exercise along with self-care deficits and medication management.  Secondary to multiple chronic medical conditions as well as safety transfers exercises    This patient is homebound because: (Please write a brief narrative summary describing the functional limitations as to why this patient is homebound and specifically what makes this patient homebound.)  Rehabilitation self-care deficits and medication management.    The patient is, or has been, under my care and I have initiated the establishment of the plan of care. This patient will be followed by a physician who will periodically review the plan of care.    Schedule follow up visit with primary care provider within 7 days to reestablish care.  She will follow-up with  orthopedics on January 23, 2019.  She will also follow-up with her primary care doctor regarding medication management future laboratory studies certainly again looking at the hemoglobin as well as her upcoming vitamin D level in the near future.  We did talk about pain control her narcotics as well as her other medications.  She did not have any other further questions she feels as if the therapy process was a positive experience for her.    Discharge coordination of care greater than 30 minutes  Electronically signed by: Michael Duane Johnson, CNP

## 2021-06-24 NOTE — TELEPHONE ENCOUNTER
Last OV 10/30/19     Return in about 3 months (around 1/30/2019) for Recheck.     Has appointment on 03/19/19

## 2021-06-24 NOTE — TELEPHONE ENCOUNTER
RN cannot approve Refill Request    RN can NOT refill this medication med is not covered by policy/route to provider. Last office visit: 1/25/2018 Hannah Arrington MD Last Physical: 10/2/2015 Last MTM visit: Visit date not found Last visit same specialty: 1/25/2018 Hannah Arrington MD.  Next visit within 3 mo: Visit date not found  Next physical within 3 mo: Visit date not found      Yesy Spears, [facility] Triage/Med Refill 3/2/2019    Requested Prescriptions   Pending Prescriptions Disp Refills     QUEtiapine (SEROQUEL) 100 MG tablet [Pharmacy Med Name: QUEtiapine Fumarate 100MG TAB] 60 tablet 5     Sig: TAKE 1 TO 2 TABLETS BY MOUTH AT BEDTIME AS NEEDED. MAY  REPEAT  ONCE.    There is no refill protocol information for this order

## 2021-06-24 NOTE — TELEPHONE ENCOUNTER
Left message for Yaneth to call back.  Paola would like to see her today.  Appt held on Paola's schedule.  Please offer any available slots today, but the 5:55 is held for her.    If she does not want to come in, we can offer her referral to PT for modalities.

## 2021-06-24 NOTE — TELEPHONE ENCOUNTER
Review of [facility] is concerning for ongoing use of opiate pain medications and lorazepam concurrent use. Also, count of 10 should not be completely gone in 5 days. This medication can not be refilled at this time. Please inform the patient. May reschedule to discuss safer alternative medication for sleep.

## 2021-06-24 NOTE — TELEPHONE ENCOUNTER
Patient Returning Call  Reason for call:  Patient   Information relayed to patient:  Relayed below information   Patient has additional questions:  Yes  If YES, what are your questions/concerns:  Patient does not have a ride and does not drive to be able to come to clinic today. Denying PT states she will get PT after f/u with surgeon in march.   Okay to leave a detailed message?: Yes

## 2021-06-24 NOTE — TELEPHONE ENCOUNTER
Review of [place]  is concerning for ongoing use of opiate pain medications and lorazepam concurrent use. This medication can not be refilled at this time. Please inform the patient. May reschedule to discuss safer alternative medication for sleep.

## 2021-06-25 NOTE — TELEPHONE ENCOUNTER
Patient no-showed for yesterday 03/19 appt, would you like an appointment scheduled first prior to refills?

## 2021-06-25 NOTE — TELEPHONE ENCOUNTER
She needs to have a now show letter and reschedule for refills, remind her that follow up appointments are important and I will refill for one week if she is completely out.

## 2021-06-28 NOTE — PROGRESS NOTES
Progress Notes by Li Moore CNP at 12/27/2019  7:10 AM     Author: Li Moore CNP Service: -- Author Type: Nurse Practitioner    Filed: 12/27/2019  8:51 AM Encounter Date: 12/27/2019 Status: Signed    : Li Moore CNP (Nurse Practitioner)       Chief Complaint   Patient presents with   ? Shortness of Breath     SOB since 3 days, chest congestion and wheezing        HPI:  Yaneth Holguin is a 69 y.o. female who presents today complaining of shortness of breath for 3 days with wheezing, reports taking albuterol inhaler and symbicort inhalers with limited relief. Reports last albuterol dose at 6am.     History obtained from the patient.    Problem List:  2019-08: COPD exacerbation (H)  2019-07: UTI (urinary tract infection)  2019-07: Urinary tract infection  2019-06: Mild major depression (H)  2019-05: Hypoxia  2019-01: Hyperkalemia  2019-01: Closed fracture of proximal end of right humerus with   delayed healing, unspecified fracture morphology, subsequent encounter  2019-01: Closed 4-part fracture of proximal humerus with delayed   healing, right  2018-02: Drug overdose  2017-10: Acute encephalopathy  2017-10: Constipation  2017-10: Insomnia  2017-10: C2 cervical fracture (H)  2017-10: Drug-seeking behavior  2017-08: Benzodiazepine dependence (H)  2015-05: Distal radius fracture  2014-12: Mood disorder in conditions classified elsewhere  Hypertension  Osteoporosis  Mixed hyperlipidemia  Acute bronchitis  Herpes Simplex  Avascular Necrosis  Generalized anxiety disorder  Esophageal Reflux  Nephrolithiasis  Closed fracture of sternum, unspecified portion of sternum, initial   encounter  Contusion of right lung, initial encounter  Encephalopathy  Other chronic pain  ONEIDA (generalized anxiety disorder)  Mood disorder due to a general medical condition  Adjustment reaction to chronic stress  Hyponatremia  CHAR (acute kidney injury) (H)  Smoker  Anemia associated with acute blood  loss  Metabolic encephalopathy  Cognitive deficits  Major depressive disorder, recurrent episode, moderate (H)  Hallucinations  Elevated troponin      Past Medical History:   Diagnosis Date   ? Anemia    ? Anxiety    ? Avascular necrosis (H)    ? COPD (chronic obstructive pulmonary disease) (H)    ? Distal radius fracture 2015    Left Distal (ace wrapped on admission). Fell when climbing stairs   ? Endometriosis    ? Fall 2015   ? Gastritis    ? GERD (gastroesophageal reflux disease)    ? Herpes simplex    ? History of kidney stones    ? History of transfusion    ? Hyperlipidemia    ? Hypertension    ? Insomnia    ? Left elbow fracture 2015   ? Osteoporosis    ? Shortness of breath     with exertion       Social History     Tobacco Use   ? Smoking status: Former Smoker     Packs/day: 0.50     Years: 45.00     Pack years: 22.50     Types: Cigarettes     Start date: 1970     Last attempt to quit: 2019     Years since quittin.9   ? Smokeless tobacco: Former User   Substance Use Topics   ? Alcohol use: No       Review of Systems   Constitutional: Negative for activity change, appetite change, chills, fatigue and fever.   HENT: Negative for congestion, ear pain, rhinorrhea and sore throat.    Respiratory: Positive for cough, chest tightness, shortness of breath, wheezing and stridor.    Cardiovascular: Negative for chest pain and palpitations.   Gastrointestinal: Negative for diarrhea, nausea and vomiting.   Genitourinary: Negative for dysuria.   Musculoskeletal: Negative for myalgias.   Neurological: Negative for dizziness and light-headedness.       Vitals:    19 0728   BP: 136/76   Pulse: 91   Resp: (!) 40   Temp: 97.7  F (36.5  C)   TempSrc: Oral   SpO2: 90%   Weight: 116 lb 12.8 oz (53 kg)       Physical Exam  Constitutional:       General: She is in acute distress.      Appearance: She is not ill-appearing, toxic-appearing or diaphoretic.      Comments: Shortness of breath        HENT:      Right Ear: Tympanic membrane, ear canal and external ear normal. There is no impacted cerumen.      Left Ear: Tympanic membrane, ear canal and external ear normal. There is no impacted cerumen.      Nose: Nose normal. No congestion or rhinorrhea.      Mouth/Throat:      Mouth: Mucous membranes are moist.      Pharynx: No oropharyngeal exudate or posterior oropharyngeal erythema.   Neck:      Musculoskeletal: Neck supple.   Cardiovascular:      Rate and Rhythm: Normal rate and regular rhythm.      Pulses: Normal pulses.      Heart sounds: Normal heart sounds. No murmur. No friction rub. No gallop.    Pulmonary:      Effort: Pulmonary effort is normal.      Breath sounds: Stridor present. Wheezing present.      Comments: Bilateral diminished based    POST NEB: Improved with DuoNeb in bilateral bases, persistent scattered wheeze noted  Lymphadenopathy:      Cervical: No cervical adenopathy.   Skin:     General: Skin is warm.      Capillary Refill: Capillary refill takes less than 2 seconds.      Findings: No rash.   Neurological:      General: No focal deficit present.      Mental Status: She is alert and oriented to person, place, and time. Mental status is at baseline.   Psychiatric:         Mood and Affect: Mood normal.         Behavior: Behavior normal.         No notes on file    Labs:  No results found for this or any previous visit (from the past 72 hour(s)).    Radiology: I have personally ordered and preliminarily reviewed the following xray:  Xr Chest 2 Views    Result Date: 12/27/2019  EXAM DATE:         12/27/2019 EXAM: X-RAY CHEST, 2 VIEWS, FRONTAL AND LATERAL LOCATION: [facility] DATE/TIME: 12/27/2019 8:30 AM INDICATION: COPD Hx,worsening SOB and wheezing x 3 days, PNA or Bronchitis concern? COMPARISON: CTA chest dated 09/22/2019. Chest radiograph of 08/25/2019. IMPRESSION: Lungs are mildly hyperinflated consistent with history of COPD. There is atelectasis at the right lung  base. No focal consolidation, pneumothorax or pleural effusion. Heart size is normal. There is an old healed sternal fracture and multiple old right rib fractures as well as vertebroplasty and upper thoracic vertebral body and compression of the adjacent vertebral body with kyphosis all of which are unchanged postoperative change right humerus with plate and screw fixation.       Clinical Decision Making: At the end of the encounter, I discussed results, diagnosis, medications. Discussed red flags for immediate return to clinic/ER, as well as indications for follow up if no improvement. Strongly encouraged close follow up ad discussed. Patient understood and agreed to plan.     CHELY Jeter, CNP       1. Obstructive chronic bronchitis with exacerbation (H)  azithromycin (ZITHROMAX Z-UYEN) 250 MG tablet    predniSONE (DELTASONE) 10 mg tablet   2. SOB (shortness of breath)  ipratropium-albuterol 0.5-2.5 mg/3 mL nebulizer solution 3 mL (DUO-NEB)    XR Chest 2 Views    XR Chest 2 Views         Patient Instructions     Patient Education     COPD Flare    You have had a flare-up of your COPD.  COPD, or chronic obstructive pulmonary disease, is a common lung disease. It causes your airways to become irritated and narrower. This makes it harder for you to breathe. Emphysema and chronic bronchitis are both types of COPD. This is a chronic condition, which means you always have it. Sometimes it gets worse. When this happens, it is called a flare-up.  Symptoms of COPD  People with COPD may have symptoms most of the time. In a flare-up, your symptoms get worse. These symptoms may mean you are having a flare-up:    Shortness of breath, shallow or rapid breathing, or wheezing that gets worse    Lung infection    Cough that gets worse    More mucus, thicker mucus or mucus of a different color    Tiredness, decreased energy, or trouble doing your usual activities    Fever    Chest tightness    Your symptoms dont get better  even when you use your usual medicines, inhalers, and nebulizer    Trouble talking    You feel confused  Causes of flare-ups  Unfortunately, a flare-up can happen even though you did everything right, and you followed your doctors instructions. Some causes of flare-ups are:    Smoking or secondhand smoke    Colds, the flu, or respiratory infections    Air pollution    Sudden change in the weather    Dust, irritating chemicals, or strong fumes    Not taking your medicines as prescribed  Home care  Here are some things you can do at home to treat a flare-up:    Try not to panic. This makes it harder to breathe, and keeps you from doing the right things.    Dont smoke or be around others who are smoking.    Try to drink more fluids than usual during a flare-up, unless your doctor has told you not to because of heart and kidney problems. More fluids can help loosen the mucus.    Use your inhalers and nebulizer, if you have one, as you have been told to.    If you were given antibiotics, take them until they are used up or your doctor tells you to stop. Its important to finish the antibiotics, even though you feel better. This will make sure the infection has cleared.    If you were given prednisone or another steroid, finish it even if you feel better.  Preventing a flare-up  Even though flare-ups happen, the best way to treat one is to prevent it before it starts. Here are some pointers:    Dont smoke or be around others who are smoking.    Take your medicines as you have been told.    Talk with your doctor about getting a flu shot every year. Also find out if you need a pneumonia shot.    If there is a weather advisory warning to stay indoors, try to stay inside when possible.    Try to eat healthy and get plenty of sleep.    Try to avoid things that usually set you off, like dust, chemical fumes, hairsprays, or strong perfumes.  Follow-up care  Follow up with your healthcare provider, or as advised.  If a culture was  done, you will be told if your treatment needs to be changed. You can call as directed for the results.  If X-rays were done, you will be notified of any new findings that may affect your care.  Call 911  Call 911 if any of these occur:    You have trouble breathing    You feel confused or its difficult to wake you up    You faint or lose consciousness    You have a rapid heart rate    You have new pain in your chest, arm, shoulder, neck or upper back  When to seek medical advice  Call your healthcare provider right away if any of these occur:    Wheezing or shortness of breath gets worse    You need to use your inhalers more often than usual without relief    Fever of 100.4 F (38 C) or higher, or as directed by your healthcare provider    Coughing up lots of dark-colored or bloody mucus (sputum)    Chest pain with each breath    You do not start to get better within 24 hours    Swelling of your ankles gets worse    Dizziness or weakness  Date Last Reviewed: 9/1/2016 2000-2017 [employer]. [address] [place] [address], [place] [address]. All rights reserved. This information is not intended as a substitute for professional medical care. Always follow your healthcare professional's instructions.

## 2021-07-03 NOTE — ADDENDUM NOTE
Addendum Note by Franklin Soria PA-C at 7/30/2019  9:01 AM     Author: Franklin Soria PA-C Service: -- Author Type: Physician Assistant    Filed: 7/30/2019  9:01 AM Encounter Date: 7/29/2019 Status: Signed    : Franklin Soria PA-C (Physician Assistant)    Addended by: FRANKLIN SORIA on: 7/30/2019 09:01 AM        Modules accepted: Orders

## 2021-07-03 NOTE — ADDENDUM NOTE
Addendum Note by Franklin Soria PA-C at 2/6/2020  3:18 PM     Author: Franklin Soria PA-C Service: -- Author Type: Physician Assistant    Filed: 2/6/2020  3:18 PM Encounter Date: 2/6/2020 Status: Signed    : Franklin Soria PA-C (Physician Assistant)    Addended by: FRANKLIN SORIA on: 2/6/2020 03:18 PM        Modules accepted: Orders

## 2021-07-03 NOTE — ADDENDUM NOTE
Addendum Note by Franklin Soria PA-C at 2/6/2020  6:18 PM     Author: Franklin Soria PA-C Service: -- Author Type: Physician Assistant    Filed: 2/6/2020  6:18 PM Encounter Date: 2/6/2020 Status: Signed    : Franklin Soria PA-C (Physician Assistant)    Addended by: FRANKLIN SORIA on: 2/6/2020 06:18 PM        Modules accepted: Orders

## 2021-07-03 NOTE — ADDENDUM NOTE
Addendum Note by Mena Bauer CMA at 2/6/2020  2:10 PM     Author: Mena Bauer CMA Service: -- Author Type: Certified Medical Assistant    Filed: 2/6/2020  2:10 PM Encounter Date: 2/6/2020 Status: Signed    : Mena Bauer CMA (Certified Medical Assistant)    Addended by: MENA BAUER on: 2/6/2020 02:10 PM        Modules accepted: Orders

## 2021-07-03 NOTE — ADDENDUM NOTE
Addendum Note by Franklin Soria PA-C at 6/21/2019 10:55 AM     Author: Franklin Soria PA-C Service: -- Author Type: Physician Assistant    Filed: 6/21/2019 11:42 AM Encounter Date: 6/21/2019 Status: Signed    : Franklin Soria PA-C (Physician Assistant)    Addended by: FRANKLIN SORIA on: 6/21/2019 11:42 AM        Modules accepted: Level of Service

## 2021-07-21 ENCOUNTER — RECORDS - HEALTHEAST (OUTPATIENT)
Dept: ADMINISTRATIVE | Facility: CLINIC | Age: 71
End: 2021-07-21

## 2021-07-22 ENCOUNTER — RECORDS - HEALTHEAST (OUTPATIENT)
Dept: INTERNAL MEDICINE | Facility: CLINIC | Age: 71
End: 2021-07-22

## 2021-07-22 DIAGNOSIS — Z12.31 OTHER SCREENING MAMMOGRAM: ICD-10-CM

## 2021-08-03 PROBLEM — F13.20 BENZODIAZEPINE DEPENDENCE (H): Status: RESOLVED | Noted: 2017-08-04 | Resolved: 2019-01-01

## 2021-08-18 NOTE — TELEPHONE ENCOUNTER
RN cannot approve Refill Request    RN can NOT refill this medication med is not covered by policy/route to provider. Last office visit: 3/26/2019 Paola Soria PA-C Last Physical: Visit date not found Last MTM visit: Visit date not found Last visit same specialty: 3/26/2019 Paola Soria PA-C.  Next visit within 3 mo: Visit date not found  Next physical within 3 mo: Visit date not found      Marysol Joshi, [facility] Triage/Med Refill 4/17/2019    Requested Prescriptions   Pending Prescriptions Disp Refills     ondansetron (ZOFRAN-ODT) 4 MG disintegrating tablet [Pharmacy Med Name: ONDANSETRON ODT 4MG TAB] 30 tablet 0     Sig: PLACE 1 TABLET IN MOUTH EVERY 8 HOURS AS NEEDED FOR NAUSEA       There is no refill protocol information for this order            Opt out

## 2021-10-19 PROBLEM — F32.9 MAJOR DEPRESSION: Status: ACTIVE | Noted: 2019-01-01

## 2024-07-24 NOTE — PROGRESS NOTES
The Clinic Community Health Worker spoke with the patient today to discuss possible Clinic Care Coordination enrollment.  The service was described to the patient and immediate needs were discussed.  The patient agreed to enrollment and an assessment was scheduled.  The patient was provided with contact information for the clinic CHW.               Assessment date: 3/13/2020 by phone with Shae WALTON     Notes:  NSx1  [facility] Prescription Program?  QMB/SLMB?  Any other resources or support she may need support              Ordering User: Christy Lara, RN Department: [facility]   Auth Provider: FRANKLIN SCHNEIDER [facility] Provider: Christy Lara, RN   Diagnosis: Anxiety  COPD exacerbation (H)  Major depressive disorder, recurrent episode, moderate (H)     Indications of Use:        Expected Date:       Order Comments: Hospitalized 2/9-2/12 with COPD exacerbation. Pt also has significant anxiety. With the new year, her inhalers are very expensive and she is looking for any financial assistance. Would she qualify for QMB or SLMB? Also, She would be an excellent candidate for pulmonary rehab. I introduced the idea and suggested she talk with her pulmonologist at her 3/6 appt. See if she followed through       
Intact